# Patient Record
Sex: MALE | Race: WHITE | Employment: OTHER | URBAN - METROPOLITAN AREA
[De-identification: names, ages, dates, MRNs, and addresses within clinical notes are randomized per-mention and may not be internally consistent; named-entity substitution may affect disease eponyms.]

---

## 2021-04-28 ENCOUNTER — APPOINTMENT (OUTPATIENT)
Dept: GENERAL RADIOLOGY | Age: 86
DRG: 291 | End: 2021-04-28
Payer: MEDICARE

## 2021-04-28 ENCOUNTER — HOSPITAL ENCOUNTER (INPATIENT)
Age: 86
LOS: 2 days | Discharge: HOME | DRG: 291 | End: 2021-04-30
Attending: INTERNAL MEDICINE | Admitting: INTERNAL MEDICINE
Payer: MEDICARE

## 2021-04-28 DIAGNOSIS — I50.9 CONGESTIVE HEART FAILURE, UNSPECIFIED HF CHRONICITY, UNSPECIFIED HEART FAILURE TYPE (HCC): ICD-10-CM

## 2021-04-28 DIAGNOSIS — R06.00 DYSPNEA AND RESPIRATORY ABNORMALITIES: Primary | ICD-10-CM

## 2021-04-28 DIAGNOSIS — R77.8 ELEVATED TROPONIN: ICD-10-CM

## 2021-04-28 DIAGNOSIS — R06.89 DYSPNEA AND RESPIRATORY ABNORMALITIES: Primary | ICD-10-CM

## 2021-04-28 PROBLEM — I50.21 ACUTE SYSTOLIC HEART FAILURE (HCC): Status: ACTIVE | Noted: 2021-04-28

## 2021-04-28 LAB
A/G RATIO: 1.3 (ref 1.1–2.2)
ALBUMIN SERPL-MCNC: 3.9 G/DL (ref 3.4–5)
ALP BLD-CCNC: 60 U/L (ref 40–129)
ALT SERPL-CCNC: 16 U/L (ref 10–40)
ANION GAP SERPL CALCULATED.3IONS-SCNC: 12 MMOL/L (ref 3–16)
AST SERPL-CCNC: 35 U/L (ref 15–37)
BASOPHILS ABSOLUTE: 0.1 K/UL (ref 0–0.2)
BASOPHILS RELATIVE PERCENT: 1.1 %
BILIRUB SERPL-MCNC: 0.4 MG/DL (ref 0–1)
BILIRUBIN URINE: NEGATIVE
BLOOD, URINE: NEGATIVE
BUN BLDV-MCNC: 33 MG/DL (ref 7–20)
CALCIUM SERPL-MCNC: 10.5 MG/DL (ref 8.3–10.6)
CHLORIDE BLD-SCNC: 106 MMOL/L (ref 99–110)
CLARITY: CLEAR
CO2: 23 MMOL/L (ref 21–32)
COLOR: YELLOW
CREAT SERPL-MCNC: 1.9 MG/DL (ref 0.8–1.3)
EOSINOPHILS ABSOLUTE: 0.1 K/UL (ref 0–0.6)
EOSINOPHILS RELATIVE PERCENT: 2.1 %
EPITHELIAL CELLS, UA: 0 /HPF (ref 0–5)
GFR AFRICAN AMERICAN: 40
GFR NON-AFRICAN AMERICAN: 33
GLOBULIN: 3 G/DL
GLUCOSE BLD-MCNC: 113 MG/DL (ref 70–99)
GLUCOSE URINE: NEGATIVE MG/DL
HCT VFR BLD CALC: 37.8 % (ref 40.5–52.5)
HEMOGLOBIN: 12.6 G/DL (ref 13.5–17.5)
HYALINE CASTS: 1 /LPF (ref 0–8)
KETONES, URINE: NEGATIVE MG/DL
LEUKOCYTE ESTERASE, URINE: ABNORMAL
LIPASE: 26 U/L (ref 13–60)
LYMPHOCYTES ABSOLUTE: 1.2 K/UL (ref 1–5.1)
LYMPHOCYTES RELATIVE PERCENT: 18.1 %
MCH RBC QN AUTO: 31.7 PG (ref 26–34)
MCHC RBC AUTO-ENTMCNC: 33.4 G/DL (ref 31–36)
MCV RBC AUTO: 95 FL (ref 80–100)
MICROSCOPIC EXAMINATION: YES
MONOCYTES ABSOLUTE: 0.5 K/UL (ref 0–1.3)
MONOCYTES RELATIVE PERCENT: 8.4 %
NEUTROPHILS ABSOLUTE: 4.6 K/UL (ref 1.7–7.7)
NEUTROPHILS RELATIVE PERCENT: 70.3 %
NITRITE, URINE: NEGATIVE
PDW BLD-RTO: 17.2 % (ref 12.4–15.4)
PH UA: 5.5 (ref 5–8)
PLATELET # BLD: 232 K/UL (ref 135–450)
PMV BLD AUTO: 8.4 FL (ref 5–10.5)
POTASSIUM REFLEX MAGNESIUM: 4.9 MMOL/L (ref 3.5–5.1)
PRO-BNP: ABNORMAL PG/ML (ref 0–449)
PROTEIN UA: NEGATIVE MG/DL
RBC # BLD: 3.98 M/UL (ref 4.2–5.9)
RBC UA: 1 /HPF (ref 0–4)
SARS-COV-2, NAAT: NOT DETECTED
SODIUM BLD-SCNC: 141 MMOL/L (ref 136–145)
SPECIFIC GRAVITY UA: 1.01 (ref 1–1.03)
TOTAL PROTEIN: 6.9 G/DL (ref 6.4–8.2)
TROPONIN: 0.02 NG/ML
URINE REFLEX TO CULTURE: YES
URINE TYPE: ABNORMAL
UROBILINOGEN, URINE: 0.2 E.U./DL
WBC # BLD: 6.5 K/UL (ref 4–11)
WBC UA: 10 /HPF (ref 0–5)

## 2021-04-28 PROCEDURE — 87635 SARS-COV-2 COVID-19 AMP PRB: CPT

## 2021-04-28 PROCEDURE — 6370000000 HC RX 637 (ALT 250 FOR IP): Performed by: NURSE PRACTITIONER

## 2021-04-28 PROCEDURE — 99284 EMERGENCY DEPT VISIT MOD MDM: CPT

## 2021-04-28 PROCEDURE — 96374 THER/PROPH/DIAG INJ IV PUSH: CPT

## 2021-04-28 PROCEDURE — 84484 ASSAY OF TROPONIN QUANT: CPT

## 2021-04-28 PROCEDURE — 85025 COMPLETE CBC W/AUTO DIFF WBC: CPT

## 2021-04-28 PROCEDURE — 80053 COMPREHEN METABOLIC PANEL: CPT

## 2021-04-28 PROCEDURE — 83880 ASSAY OF NATRIURETIC PEPTIDE: CPT

## 2021-04-28 PROCEDURE — 1200000000 HC SEMI PRIVATE

## 2021-04-28 PROCEDURE — 93005 ELECTROCARDIOGRAM TRACING: CPT | Performed by: NURSE PRACTITIONER

## 2021-04-28 PROCEDURE — G0378 HOSPITAL OBSERVATION PER HR: HCPCS

## 2021-04-28 PROCEDURE — 87086 URINE CULTURE/COLONY COUNT: CPT

## 2021-04-28 PROCEDURE — 6360000002 HC RX W HCPCS: Performed by: NURSE PRACTITIONER

## 2021-04-28 PROCEDURE — 71045 X-RAY EXAM CHEST 1 VIEW: CPT

## 2021-04-28 PROCEDURE — 83690 ASSAY OF LIPASE: CPT

## 2021-04-28 PROCEDURE — 81001 URINALYSIS AUTO W/SCOPE: CPT

## 2021-04-28 RX ORDER — ASPIRIN 81 MG/1
324 TABLET, CHEWABLE ORAL ONCE
Status: COMPLETED | OUTPATIENT
Start: 2021-04-28 | End: 2021-04-28

## 2021-04-28 RX ORDER — METOPROLOL SUCCINATE 25 MG/1
12.5 TABLET, EXTENDED RELEASE ORAL DAILY
COMMUNITY

## 2021-04-28 RX ORDER — FUROSEMIDE 10 MG/ML
20 INJECTION INTRAMUSCULAR; INTRAVENOUS ONCE
Status: COMPLETED | OUTPATIENT
Start: 2021-04-28 | End: 2021-04-28

## 2021-04-28 RX ORDER — VALSARTAN 40 MG/1
40 TABLET ORAL DAILY
COMMUNITY

## 2021-04-28 RX ORDER — TAMSULOSIN HYDROCHLORIDE 0.4 MG/1
0.4 CAPSULE ORAL NIGHTLY
COMMUNITY

## 2021-04-28 RX ORDER — ASPIRIN 81 MG/1
81 TABLET, CHEWABLE ORAL DAILY
COMMUNITY

## 2021-04-28 RX ORDER — MULTIVIT-MIN/IRON/FOLIC ACID/K 18-600-40
1 CAPSULE ORAL DAILY
COMMUNITY

## 2021-04-28 RX ORDER — ALLOPURINOL 100 MG/1
200 TABLET ORAL DAILY
COMMUNITY

## 2021-04-28 RX ADMIN — FUROSEMIDE 20 MG: 10 INJECTION, SOLUTION INTRAMUSCULAR; INTRAVENOUS at 20:36

## 2021-04-28 RX ADMIN — NITROGLYCERIN 0.5 INCH: 20 OINTMENT TOPICAL at 20:37

## 2021-04-28 RX ADMIN — ASPIRIN 324 MG: 81 TABLET, CHEWABLE ORAL at 19:37

## 2021-04-28 ASSESSMENT — PAIN DESCRIPTION - FREQUENCY: FREQUENCY: INTERMITTENT

## 2021-04-28 ASSESSMENT — PAIN DESCRIPTION - PAIN TYPE: TYPE: ACUTE PAIN

## 2021-04-28 ASSESSMENT — PAIN DESCRIPTION - DESCRIPTORS: DESCRIPTORS: DISCOMFORT

## 2021-04-28 ASSESSMENT — PAIN DESCRIPTION - ORIENTATION: ORIENTATION: LOWER;LEFT

## 2021-04-28 NOTE — ED PROVIDER NOTES
Financial resource strain: None    Food insecurity     Worry: None     Inability: None    Transportation needs     Medical: None     Non-medical: None   Tobacco Use    Smoking status: Current Every Day Smoker     Types: Pipe   Substance and Sexual Activity    Alcohol use: No    Drug use: No    Sexual activity: Not Currently   Lifestyle    Physical activity     Days per week: None     Minutes per session: None    Stress: None   Relationships    Social connections     Talks on phone: None     Gets together: None     Attends Temple service: None     Active member of club or organization: None     Attends meetings of clubs or organizations: None     Relationship status: None    Intimate partner violence     Fear of current or ex partner: None     Emotionally abused: None     Physically abused: None     Forced sexual activity: None   Other Topics Concern    None   Social History Narrative    None     History reviewed. No pertinent family history. PHYSICAL EXAM    VITAL SIGNS: /72   Pulse 92   Temp 98.5 °F (36.9 °C) (Temporal)   Resp 16   Ht 5' 10\" (1.778 m)   Wt 148 lb 9.4 oz (67.4 kg)   SpO2 95%   BMI 21.32 kg/m²    Constitutional:  Well developed, well nourished, no acute distress   HENT:  Atraumatic, moist mucus membranes. Mucous membranes are dry  Neck: supple, no JVD   Respiratory:  Lungs CTA throughout, no wheezes, no retractions.   Does appear dyspneic at rest with some conversational dyspnea  Cardiovascular:  regular rate, no murmurs  Vascular: Radial and DP pulses 2+ and equal bilaterally  GI:  Soft, nontender, normal bowel sounds  Musculoskeletal:  no lower extremity edema, no lower extremity asymmetry,no calf tenderness, no thigh tenderness, no acute deformities  Integument:  Skin is warm and dry, no petechiae   Neurologic:  Alert & oriented, no slurred speech  Psych: Pleasant affect, no hallucinations      EKG    Please see the physician note for EKG interpretation. RADIOLOGY/PROCEDURES    XR CHEST PORTABLE   Final Result   Cardiomegaly with bilateral pleural effusions right greater than left             ED COURSE & MEDICAL DECISION MAKING    Pertinent Labs & Imaging studies reviewed and interpreted. (See chart for details)    See chart for details of medications given during the ED stay. Vitals:    04/28/21 1945 04/28/21 2000 04/28/21 2030 04/28/21 2126   BP: 129/86 124/76 124/80 125/72   Pulse: 87 90 89 92   Resp: 19 22 25 16   Temp:       TempSrc:       SpO2: 96% 96% 97% 95%   Weight:       Height:           Differential Diagnosis: Acute Coronary Syndrome, Congestive Heart Failure, Myocardial Infarction, Pulmonary Embolus, Pneumonia, Pneumothorax, other    CRITICAL CARE NOTE:  There was a high probability of clinically significant life-threatening deterioration of the patient's condition requiring my urgent intervention. Total critical care time was at least 15 minutes. This includes vital sign monitoring, pulse oximetry monitoring, telemetry monitoring, clinical response to the IV medications, reviewing the nursing notes, consultation time, dictation/documentation time, and interpretation of the labwork. This excludes any separately billable procedures performed. The critical care time above also includes time spent obtaining history from electronic chart, as the patient was unable to provide the history AND the history obtained was directly relevant to the care of the patient. Patient is afebrile and nontoxic in appearance. Labs reveal no leukocytosis; stable anemia. Metabolic panel shows no significant electrolyte derangements. LFTs within normal limits. Does have slight increase in patient's creatinine from baseline from 1.7-10 now 1.9. BUN is 33 and GFR is 33. Lipase within normal limits. CXR findings as above. EKG interpreted by physician. Troponin slightly elevated 0.02.   [place] [address]. Consultation with hospitalist: I contacted Dr. Wiley Grimes via [facility], and the patient was accepted for admission. FINAL IMPRESSION    1. Dyspnea and respiratory abnormalities    2. Elevated troponin    3.  Congestive heart failure, unspecified HF chronicity, unspecified heart failure type [facility])        PLAN  Admission to the hospital    (Please note that this note was completed with a voice recognition program.  Every attempt was made to edit the dictations, but inevitably there remain words that are mis-transcribed.)        Julio César Colvin, YULIYA - JAKE  04/28/21 3780

## 2021-04-29 LAB
ANION GAP SERPL CALCULATED.3IONS-SCNC: 16 MMOL/L (ref 3–16)
BUN BLDV-MCNC: 35 MG/DL (ref 7–20)
CALCIUM SERPL-MCNC: 10.1 MG/DL (ref 8.3–10.6)
CHLORIDE BLD-SCNC: 104 MMOL/L (ref 99–110)
CHOLESTEROL, TOTAL: 156 MG/DL (ref 0–199)
CO2: 21 MMOL/L (ref 21–32)
CREAT SERPL-MCNC: 1.9 MG/DL (ref 0.8–1.3)
EKG ATRIAL RATE: 87 BPM
EKG DIAGNOSIS: NORMAL
EKG P AXIS: 62 DEGREES
EKG P-R INTERVAL: 186 MS
EKG Q-T INTERVAL: 428 MS
EKG QRS DURATION: 154 MS
EKG QTC CALCULATION (BAZETT): 515 MS
EKG R AXIS: -18 DEGREES
EKG T AXIS: 73 DEGREES
EKG VENTRICULAR RATE: 87 BPM
GFR AFRICAN AMERICAN: 40
GFR NON-AFRICAN AMERICAN: 33
GLUCOSE BLD-MCNC: 95 MG/DL (ref 70–99)
HCT VFR BLD CALC: 37.5 % (ref 40.5–52.5)
HDLC SERPL-MCNC: 43 MG/DL (ref 40–60)
HEMOGLOBIN: 12.4 G/DL (ref 13.5–17.5)
LDL CHOLESTEROL CALCULATED: 98 MG/DL
MAGNESIUM: 1.9 MG/DL (ref 1.8–2.4)
MCH RBC QN AUTO: 31.1 PG (ref 26–34)
MCHC RBC AUTO-ENTMCNC: 33 G/DL (ref 31–36)
MCV RBC AUTO: 94.1 FL (ref 80–100)
PDW BLD-RTO: 16.8 % (ref 12.4–15.4)
PLATELET # BLD: 222 K/UL (ref 135–450)
PMV BLD AUTO: 8.1 FL (ref 5–10.5)
POTASSIUM SERPL-SCNC: 3.7 MMOL/L (ref 3.5–5.1)
RBC # BLD: 3.98 M/UL (ref 4.2–5.9)
SODIUM BLD-SCNC: 141 MMOL/L (ref 136–145)
TRIGL SERPL-MCNC: 76 MG/DL (ref 0–150)
TROPONIN: 0.03 NG/ML
TROPONIN: 0.03 NG/ML
TSH SERPL DL<=0.05 MIU/L-ACNC: 4.59 UIU/ML (ref 0.27–4.2)
URINE CULTURE, ROUTINE: NORMAL
VLDLC SERPL CALC-MCNC: 15 MG/DL
WBC # BLD: 6.9 K/UL (ref 4–11)

## 2021-04-29 PROCEDURE — 6370000000 HC RX 637 (ALT 250 FOR IP): Performed by: NURSE PRACTITIONER

## 2021-04-29 PROCEDURE — 80061 LIPID PANEL: CPT

## 2021-04-29 PROCEDURE — 93010 ELECTROCARDIOGRAM REPORT: CPT | Performed by: INTERNAL MEDICINE

## 2021-04-29 PROCEDURE — 36415 COLL VENOUS BLD VENIPUNCTURE: CPT

## 2021-04-29 PROCEDURE — 84443 ASSAY THYROID STIM HORMONE: CPT

## 2021-04-29 PROCEDURE — 96376 TX/PRO/DX INJ SAME DRUG ADON: CPT

## 2021-04-29 PROCEDURE — 84484 ASSAY OF TROPONIN QUANT: CPT

## 2021-04-29 PROCEDURE — 83735 ASSAY OF MAGNESIUM: CPT

## 2021-04-29 PROCEDURE — 2580000003 HC RX 258: Performed by: NURSE PRACTITIONER

## 2021-04-29 PROCEDURE — 1200000000 HC SEMI PRIVATE

## 2021-04-29 PROCEDURE — 80048 BASIC METABOLIC PNL TOTAL CA: CPT

## 2021-04-29 PROCEDURE — 6370000000 HC RX 637 (ALT 250 FOR IP): Performed by: INTERNAL MEDICINE

## 2021-04-29 PROCEDURE — 6360000002 HC RX W HCPCS: Performed by: NURSE PRACTITIONER

## 2021-04-29 PROCEDURE — G0378 HOSPITAL OBSERVATION PER HR: HCPCS

## 2021-04-29 PROCEDURE — 94760 N-INVAS EAR/PLS OXIMETRY 1: CPT

## 2021-04-29 PROCEDURE — 99223 1ST HOSP IP/OBS HIGH 75: CPT | Performed by: INTERNAL MEDICINE

## 2021-04-29 PROCEDURE — 85027 COMPLETE CBC AUTOMATED: CPT

## 2021-04-29 RX ORDER — SODIUM CHLORIDE 9 MG/ML
25 INJECTION, SOLUTION INTRAVENOUS PRN
Status: DISCONTINUED | OUTPATIENT
Start: 2021-04-29 | End: 2021-04-30 | Stop reason: HOSPADM

## 2021-04-29 RX ORDER — POLYETHYLENE GLYCOL 3350 17 G/17G
17 POWDER, FOR SOLUTION ORAL DAILY PRN
Status: DISCONTINUED | OUTPATIENT
Start: 2021-04-29 | End: 2021-04-30 | Stop reason: HOSPADM

## 2021-04-29 RX ORDER — ONDANSETRON 2 MG/ML
4 INJECTION INTRAMUSCULAR; INTRAVENOUS EVERY 6 HOURS PRN
Status: DISCONTINUED | OUTPATIENT
Start: 2021-04-29 | End: 2021-04-30 | Stop reason: HOSPADM

## 2021-04-29 RX ORDER — TAMSULOSIN HYDROCHLORIDE 0.4 MG/1
0.4 CAPSULE ORAL NIGHTLY
Status: DISCONTINUED | OUTPATIENT
Start: 2021-04-29 | End: 2021-04-30 | Stop reason: HOSPADM

## 2021-04-29 RX ORDER — ALLOPURINOL 100 MG/1
200 TABLET ORAL DAILY
Status: DISCONTINUED | OUTPATIENT
Start: 2021-04-29 | End: 2021-04-30 | Stop reason: HOSPADM

## 2021-04-29 RX ORDER — ACETAMINOPHEN 650 MG/1
650 SUPPOSITORY RECTAL EVERY 6 HOURS PRN
Status: DISCONTINUED | OUTPATIENT
Start: 2021-04-29 | End: 2021-04-30 | Stop reason: HOSPADM

## 2021-04-29 RX ORDER — SODIUM CHLORIDE 0.9 % (FLUSH) 0.9 %
5-40 SYRINGE (ML) INJECTION EVERY 12 HOURS SCHEDULED
Status: DISCONTINUED | OUTPATIENT
Start: 2021-04-29 | End: 2021-04-30 | Stop reason: HOSPADM

## 2021-04-29 RX ORDER — VITAMIN B COMPLEX
2000 TABLET ORAL DAILY
Status: DISCONTINUED | OUTPATIENT
Start: 2021-04-29 | End: 2021-04-30 | Stop reason: HOSPADM

## 2021-04-29 RX ORDER — ISOSORBIDE MONONITRATE 30 MG/1
30 TABLET, EXTENDED RELEASE ORAL DAILY
Status: DISCONTINUED | OUTPATIENT
Start: 2021-04-29 | End: 2021-04-30 | Stop reason: HOSPADM

## 2021-04-29 RX ORDER — ACETAMINOPHEN 325 MG/1
650 TABLET ORAL EVERY 6 HOURS PRN
Status: DISCONTINUED | OUTPATIENT
Start: 2021-04-29 | End: 2021-04-30 | Stop reason: HOSPADM

## 2021-04-29 RX ORDER — PROMETHAZINE HYDROCHLORIDE 25 MG/1
12.5 TABLET ORAL EVERY 6 HOURS PRN
Status: DISCONTINUED | OUTPATIENT
Start: 2021-04-29 | End: 2021-04-30 | Stop reason: HOSPADM

## 2021-04-29 RX ORDER — VALSARTAN 80 MG/1
40 TABLET ORAL DAILY
Status: DISCONTINUED | OUTPATIENT
Start: 2021-04-29 | End: 2021-04-30 | Stop reason: HOSPADM

## 2021-04-29 RX ORDER — ASPIRIN 81 MG/1
81 TABLET, CHEWABLE ORAL DAILY
Status: DISCONTINUED | OUTPATIENT
Start: 2021-04-29 | End: 2021-04-30 | Stop reason: HOSPADM

## 2021-04-29 RX ORDER — FUROSEMIDE 10 MG/ML
40 INJECTION INTRAMUSCULAR; INTRAVENOUS 2 TIMES DAILY
Status: DISCONTINUED | OUTPATIENT
Start: 2021-04-29 | End: 2021-04-29

## 2021-04-29 RX ORDER — FUROSEMIDE 10 MG/ML
40 INJECTION INTRAMUSCULAR; INTRAVENOUS DAILY
Status: DISCONTINUED | OUTPATIENT
Start: 2021-04-29 | End: 2021-04-30

## 2021-04-29 RX ORDER — METOPROLOL SUCCINATE 25 MG/1
12.5 TABLET, EXTENDED RELEASE ORAL DAILY
Status: DISCONTINUED | OUTPATIENT
Start: 2021-04-29 | End: 2021-04-30 | Stop reason: HOSPADM

## 2021-04-29 RX ORDER — SODIUM CHLORIDE 0.9 % (FLUSH) 0.9 %
5-40 SYRINGE (ML) INJECTION PRN
Status: DISCONTINUED | OUTPATIENT
Start: 2021-04-29 | End: 2021-04-30 | Stop reason: HOSPADM

## 2021-04-29 RX ADMIN — FUROSEMIDE 40 MG: 10 INJECTION, SOLUTION INTRAMUSCULAR; INTRAVENOUS at 08:34

## 2021-04-29 RX ADMIN — VALSARTAN 40 MG: 80 TABLET, FILM COATED ORAL at 08:34

## 2021-04-29 RX ADMIN — TAMSULOSIN HYDROCHLORIDE 0.4 MG: 0.4 CAPSULE ORAL at 01:00

## 2021-04-29 RX ADMIN — ISOSORBIDE MONONITRATE 30 MG: 30 TABLET, EXTENDED RELEASE ORAL at 20:28

## 2021-04-29 RX ADMIN — Medication 10 ML: at 20:28

## 2021-04-29 RX ADMIN — Medication 2000 UNITS: at 08:34

## 2021-04-29 RX ADMIN — APIXABAN 5 MG: 5 TABLET, FILM COATED ORAL at 08:34

## 2021-04-29 RX ADMIN — ASPIRIN 81 MG: 81 TABLET, CHEWABLE ORAL at 08:34

## 2021-04-29 RX ADMIN — TAMSULOSIN HYDROCHLORIDE 0.4 MG: 0.4 CAPSULE ORAL at 20:28

## 2021-04-29 RX ADMIN — APIXABAN 5 MG: 5 TABLET, FILM COATED ORAL at 01:00

## 2021-04-29 RX ADMIN — APIXABAN 5 MG: 5 TABLET, FILM COATED ORAL at 20:28

## 2021-04-29 RX ADMIN — ALLOPURINOL 200 MG: 100 TABLET ORAL at 08:34

## 2021-04-29 RX ADMIN — Medication 10 ML: at 08:36

## 2021-04-29 RX ADMIN — METOPROLOL SUCCINATE 12.5 MG: 25 TABLET, EXTENDED RELEASE ORAL at 08:34

## 2021-04-29 ASSESSMENT — PAIN SCALES - GENERAL: PAINLEVEL_OUTOF10: 0

## 2021-04-29 NOTE — PROGRESS NOTES
light. Glasses   Neck: Supple,  full range of motion. No jugular venous distention. Trachea midline. Respiratory:  Normal effort. Clear to auscultation,  Cardiovascular: Regular rate and rhythm with normal S1/S2 without murmurs, rubs or gallops. Abdomen: Soft, non-tender, non-distended with normal bowel sounds. Musculoskeletal: No clubbing, cyanosis or edema bilaterally. Full range of motion without deformity. Skin: Skin color, texture, turgor normal.  No rashes or lesions. Neurologic:  Neurovascularly intact without any focal sensory/motor deficits. Cranial nerves: II-XII intact, grossly non-focal.  Psychiatric: Alert and oriented, thought content appropriate, normal insight  Peripheral Pulses: +2 palpable, equal bilaterally       Labs:   Recent Labs     04/28/21 1932 04/29/21 0342   WBC 6.5 6.9   HGB 12.6* 12.4*   HCT 37.8* 37.5*    222     Recent Labs     04/28/21 1932 04/29/21  0343    141   K 4.9 3.7    104   CO2 23 21   BUN 33* 35*   CREATININE 1.9* 1.9*   CALCIUM 10.5 10.1     Recent Labs     04/28/21 1932   AST 35   ALT 16   BILITOT 0.4   ALKPHOS 60     No results for input(s): INR in the last 72 hours. Recent Labs     04/28/21 1932 04/29/21  0054 04/29/21 0342   TROPONINI 0.02* 0.03* 0.03*       Urinalysis:      Lab Results   Component Value Date    NITRU Negative 04/28/2021    WBCUA 10 04/28/2021    RBCUA 1 04/28/2021    BLOODU Negative 04/28/2021    SPECGRAV 1.010 04/28/2021    GLUCOSEU Negative 04/28/2021    GLUCOSEU NEGATIVE 09/03/2011       Radiology:  XR CHEST PORTABLE   Final Result   Cardiomegaly with bilateral pleural effusions right greater than left         XR CHEST PA INSPIRATION 1 VW    (Results Pending)       Echo [facility] 3/20/21    Overall left ventricular ejection fraction is estimated to be 30-35%. Mild aortic regurgitation. There is moderate to severe global hypokinesis of the left ventricle. There is trace mitral regurgitation.   Mild pulmonic valvular regurgitation. The mitral inflow pattern is consistent with Diastolic Dysfunction. Echo 9/2020  Overall left ventricular ejection fraction is estimated to be 40-45%. Diffuse hypokinesis with moderate hypokinesis of the basal and mid  inferolateral, basal inferior segments. Assessment/Plan:    Active Hospital Problems    Diagnosis    Acute systolic heart failure (HCC) [I50.21]     Acute on chronic systolic CHF (actually may be a combined as has diastolic dysfx as well)   Has seen an outpt cardiologist they couldn't remember name , Adry Ambrosio seen in a while then recently saw   Echo good shaylee March EF 30-35% with global hypokinesis   --continue toprol 12.5mg daily  ARB diovan 40mg daily   Start lasix 40mg IV daily   Daily weight  148->136    Hx DVT  -on apixaban     BPH  On flomax    CKD stage III  cr4 1.9   reportedly cardiology talking about cath. Pt is in agreeement . Caution to use as little dye as possible given his underlying renal fx. Doesn't see a kidney dr.     He is otherwise doing well and weaned to RA.  Await cardiology recs           DVT Prophylaxis: eliquis   Diet: DIET LOW SODIUM 2 GM; 1500 ml  Code Status: Full Code    PT/OT Eval Status: independent     Dispo - home     Ronnell Mcintyre MD

## 2021-04-29 NOTE — CARE COORDINATION
INITIAL CASE MANAGEMENT ASSESSMENT    Reviewed chart, met with patient to assess possible discharge needs. Explained Case Management role/services. Living Situation: confirmed address, lives alone in home with 1 TONIO     ADLs: independent      DME: patient states he has a walker and a cane which he uses as needed    PT/OT Recs: not ordered     Active Services: none at this time, patient states he was getting [facility] services through [facility] previously - see update below     Transportation: does not drive, patient reports his son Marge Amezcua drives him to appointments and will likely  at discharge     Medications: Pharmacy: [facility], no issues obtaining or taking medications, patient son is a pharmacist at this facility and helps with medications     PCP: confirmed [facility]     PLAN/COMMENTS: plan is home with family support, follow for needs     SW/CM provided contact information for patient or family to call with any questions. SW/CM will follow and assist as needed. Roberto FELIZ, SHERLYN-S, Social Work  [phone]    Electronically signed by TRAY Hampton, TOÑOW on 4/29/2021 at 2:19 PM    Sw received call from Lela Coughlin with [facility] ([phone]) who reports that patient is active with them (PT and RN services). He will need resumption orders at discharge.      Electronically signed by TRAY Hampton LSW on 4/29/2021 at 4:04 PM

## 2021-04-29 NOTE — ED NOTES
Report given to [facility] [place]        December JANELLE Cornell  04/28/21 0846

## 2021-04-29 NOTE — PLAN OF CARE
Problem: Pain:  Goal: Pain level will decrease  Description: Pain level will decrease  4/29/2021 1338 by Shaan Moraes RN  Outcome: Ongoing  4/29/2021 0135 by Rebecca Chand RN  Outcome: Ongoing     Problem: Pain:  Goal: Control of acute pain  Description: Control of acute pain  4/29/2021 1338 by Shaan Moraes RN  Outcome: Ongoing  4/29/2021 0135 by Rebecca Chand RN  Outcome: Ongoing

## 2021-04-29 NOTE — PLAN OF CARE
Problem: Pain:  Goal: Pain level will decrease  Description: Pain level will decrease  Outcome: Ongoing  Goal: Control of acute pain  Description: Control of acute pain  Outcome: Ongoing  Goal: Control of chronic pain  Description: Control of chronic pain  Outcome: Ongoing     Problem: OXYGENATION/RESPIRATORY FUNCTION  Goal: Patient will maintain patent airway  Outcome: Ongoing  Goal: Patient will achieve/maintain normal respiratory rate/effort  Description: Respiratory rate and effort will be within normal limits for the patient  Outcome: Ongoing     Problem: HEMODYNAMIC STATUS  Goal: Patient has stable vital signs and fluid balance  Outcome: Ongoing     Problem: FLUID AND ELECTROLYTE IMBALANCE  Goal: Fluid and electrolyte balance are achieved/maintained  Outcome: Ongoing     Problem: ACTIVITY INTOLERANCE/IMPAIRED MOBILITY  Goal: Mobility/activity is maintained at optimum level for patient  Outcome: Ongoing

## 2021-04-29 NOTE — H&P
Hospital Medicine History & Physical      PCP: Trish Apgar    Date of Admission: 2021    Date of Service: Pt seen/examined on 2021 and Admitted to Inpatient with expected LOS greater than two midnights due to medical therapy. Chief Complaint:  Shortness of breath      History Of Present Illness:      80 y.o. male with PMHx of Skin Ca, HTN, DVT, CVA and HF presented to [facility] with shortness of breath. Patient reports shortness of breath began Monday worse lying flat and with exertion. He started taking Lasix ordered by PCP with some improvement. He states he still cannot lay flat. He denies having any chest pain. He denies dizziness or lightheadedness. On chart review patient was admitted about a month ago at an outside hospital for the same. He has history of systolic heart failure and was diuresed with IV Lasix with improvement in symptoms. Past Medical History:          Diagnosis Date    Cancer [facility])     skin on head    CHF (congestive heart failure) (HCC)     TIA (transient ischemic attack)        Past Surgical History:      History reviewed. No pertinent surgical history. Medications Prior to Admission:      Prior to Admission medications    Medication Sig Start Date End Date Taking?  Authorizing Provider   apixaban (ELIQUIS) 5 MG TABS tablet Take 5 mg by mouth 2 times daily   Yes Historical Provider, MD   metoprolol succinate (TOPROL XL) 25 MG extended release tablet Take 12.5 mg by mouth daily   Yes Historical Provider, MD   tamsulosin (FLOMAX) 0.4 MG capsule Take 0.4 mg by mouth nightly   Yes Historical Provider, MD   valsartan (DIOVAN) 40 MG tablet Take 40 mg by mouth daily   Yes Historical Provider, MD   Cholecalciferol (VITAMIN D) 50 MCG ( UT) CAPS capsule Take 1 capsule by mouth daily   Yes Historical Provider, MD   aspirin 81 MG chewable tablet Take 81 mg by mouth daily   Yes Historical Provider, MD   allopurinol (ZYLOPRIM) 100 MG tablet Take 200 Recent Labs     04/28/21 1932   WBC 6.5   HGB 12.6*   HCT 37.8*        Recent Labs     04/28/21 1932      K 4.9      CO2 23   BUN 33*   CREATININE 1.9*   CALCIUM 10.5     Recent Labs     04/28/21 1932   AST 35   ALT 16   BILITOT 0.4   ALKPHOS 60     No results for input(s): INR in the last 72 hours. Recent Labs     04/28/21 1932   TROPONINI 0.02*       Urinalysis:      Lab Results   Component Value Date    NITRU Negative 04/28/2021    WBCUA 10 04/28/2021    RBCUA 1 04/28/2021    BLOODU Negative 04/28/2021    SPECGRAV 1.010 04/28/2021    GLUCOSEU Negative 04/28/2021    GLUCOSEU NEGATIVE 09/03/2011       Radiology:     EKG:  I have reviewed the EKG with the following interpretation: Normal sinus rhythm, ventricular rate 87. No acute ST elevation.     XR CHEST PORTABLE   Final Result   Cardiomegaly with bilateral pleural effusions right greater than left             ASSESSMENT:    Active Hospital Problems    Diagnosis Date Noted    Acute systolic heart failure ([address] [I50.21] 04/28/2021         PLAN:    Acute systolic CHF   - LAY 2/84/25 LVEF 30-35% mild aortic regurg, mod to sev global hypokinesis of LV  - proBNP 93459  - lasix 40 mg IV daily  - cont BB, ACEi/ARB  - daily wts  - I/Os  - consult CHF RN  - consult cardiology    Acute on Chronic Renal Failure    - Renal function is slightly above baseline, today 1.9 (1.4-1.6)  - had urinary retention 3/21 and urine studies performed, will bladder scan  - renal u/s 3/21: no obstructive uropathy, 4.4cm simple cyst left superior pole, thinning of the left renal parenchymal cortex may reflect asymmetric medical renal disease  - Monitor renal function and avoid Nephrotoxic agents as able     DVT  - continue Eliquis    HTN  - monitor blood pressure  - continue metoprolol    DVT Prophylaxis: Eliquis  Diet: DIET LOW SODIUM 2 GM;  Code Status: Full Code    Dispo - Inpatient       [address], APRN - CNP    Thank you Alessandro Seaman for the opportunity to be involved in this patient's care. If you have any questions or concerns please feel free to contact me at [phone].

## 2021-04-29 NOTE — DISCHARGE INSTR - COC
Continuity of Care Form    Patient Name: Tc Kapoor   :  1930  MRN:  [id number]    Admit date:  2021  Discharge date:  ***    Code Status Order: Full Code   Advance Directives:   Advance Care Flowsheet Documentation       Date/Time Healthcare Directive Type of Healthcare Directive Copy in [address] Agent's Name Healthcare Agent's Phone Number    21 0013  Yes, patient has an advance directive for healthcare treatment  Living will  No, copy requested from family  --  --  --            Admitting Physician:  Lita Chavez MD  PCP: Eveline Garcia    Discharging Nurse: [facility] Unit/Room#: L6C-4674/2133-22  Discharging Unit Phone Number: ***    Emergency Contact:   Extended Emergency Contact Information  Primary Emergency Contact: Rafiq Aparicio, [address] [place] [address] Phone: [phone]  Mobile Phone: [phone]  Relation: Child  Secondary Emergency Contact: Kris Sorto  [place] Phone: [phone]  Relation: Child  Preferred language: English    Past Surgical History:  History reviewed. No pertinent surgical history. Immunization History: There is no immunization history on file for this patient.     Active Problems:  Patient Active Problem List   Diagnosis Code    Acute systolic heart failure (HCC) I50.21       Isolation/Infection:   Isolation            No Isolation          Patient Infection Status       Infection Onset Added Last Indicated Last Indicated By Review Planned Expiration Resolved Resolved By    None active    Resolved    COVID-19 Rule Out 21 SARS-CoV-2 NAAT (Rapid) (Ordered)   21 Rule-Out Test Resulted            Nurse Assessment:  Last Vital Signs: /68   Pulse 63   Temp 98.8 °F (37.1 °C) (Oral)   Resp 16   Ht 5' 10\" (1.778 m)   Wt 136 lb 3.9 oz (61.8 kg)   SpO2 93%   BMI 19.55 kg/m²     Last documented pain score (0-10 scale): Pain Level: 0  Last Weight:   Wt Readings from Last 1 Encounters:   04/29/21 136 lb 3.9 oz (61.8 kg)     Mental Status:  oriented and alert    IV Access:  - None    Nursing Mobility/ADLs:  Walking   Independent  Transfer  Independent  Bathing  Independent  Dressing  Independent  Toileting  Independent  Feeding  Independent  Med [address]  Med Delivery   whole    Wound Care Documentation and Therapy:        Elimination:  Continence:   · Bowel: Yes  · Bladder: Yes  Urinary Catheter: None   Colostomy/Ileostomy/Ileal Conduit: No       Date of Last BM: 4/29/2021    Intake/Output Summary (Last 24 hours) at 4/29/2021 1322  Last data filed at 4/29/2021 1313  Gross per 24 hour   Intake 800 ml   Output --   Net 800 ml     I/O last 3 completed shifts: In: 300 [P.O.:300]  Out: -     Safety Concerns: At Risk for Falls    Impairments/Disabilities:      None    Nutrition Therapy:  Current Nutrition Therapy:   - Oral Diet:  Low Sodium (2gm)    Routes of Feeding: Oral  Liquids: Thin Liquids  Daily Fluid Restriction: yes - amount 1500  Last Modified Barium Swallow with Video (Video Swallowing Test): not done    Treatments at the Time of Hospital Discharge:   Respiratory Treatments: ***  Oxygen Therapy:  is not on home oxygen therapy. Ventilator:    - No ventilator support     Heart Failure Instructions for Daily Management  Patient was treated for acute on chronic diastolic and combined systolic and diastolic heart failure. he  will require the following:     Please weigh daily on the same scale and approximately the same time of day. Report weight gain of 3 pounds/day or 5 pounds/week to : [facility] [phone].  Please use hospital discharge weight as baseline reference.     Please monitor for signs and symptoms of and report to MD:  o Worsening Heart Failure: sudden weight gain, shortness of breath, lower extremity or general edema/swelling, abdominal bloating/swelling, inability to lie flat, intolerance to usual activity, or cough (especially at night). Report these finding even if no increase in weight.  o Dehydration:  having difficulty or a decrease in urination, dizziness, worsening fatigue, or new onset/worsening of generalized weakness.  Please continue a LOW SODIUM diet and LIMIT fluid intake to 48 - 64 ounces ( 1.5 - 2 liters) per day. [facility] Call [facility] [phone] and/or Pili Chong @ [phone] with any questions or concerns.  Please continue heart failure education to patient and family/support system.  See After Visit Summary for hospital follow up appointment details.  Consider spiritual care referral for support and/or completion of advance directives [phone].  Consider: [place] [address] telehealth program if patient agreeable and able to participate, palliative care consult for ongoing goals of care, end of life, and/or chronic disease management discussions and referral to [facility] ([phone]) once SNF/HHC complete.   Dr Kacey Merino is 's primary cardiologist.       Rehab Therapies: Physical Therapy and Occupational Therapy  Weight Bearing Status/Restrictions: No weight bearing restirctions  Other Medical Equipment (for information only, NOT a DME order):  none  Other Treatments: ***    Patient's personal belongings (please select all that are sent with patient):  None    RN SIGNATURE:  Electronically signed by Sandra Braxton RN on 4/30/21 at 4:44 PM EDT    CASE MANAGEMENT/SOCIAL WORK SECTION    Inpatient Status Date: 04/30/2021    Readmission Risk Assessment Score:  Readmission Risk              Risk of Unplanned Readmission:        13           Discharging to Facility/ Agency   · Name: [facility]  · Address: [address] [place] [address]  · Phone: [phone]  · Fax: [phone]    / signature: Electronically signed by ANKIT Flores on 4/30/2021 at 4:27 PM      PHYSICIAN SECTION    Prognosis:

## 2021-04-29 NOTE — PROGRESS NOTES
Admitted patient to room 4257 from the Emergency Room with CHF. VS stable (see flowsheet). Patient's breathing regular and unlabored, denies pain. Oriented to room, call light, TV, phone, patient rights and responsibilities. Bed in lowest position and locked, exit alarm in place. Non-slip socks on. ID bracelet on and correct per pt verbally reporting name and date of birth. Call light within reach. Needed items within reach.

## 2021-04-29 NOTE — PROGRESS NOTES
4 Eyes Skin Assessment     NAME:  John Hauser  YOB: 1930  MEDICAL RECORD NUMBER:  [id number]    The patient is being assess for  Admission    I agree that 2 RN's have performed a thorough Head to Toe Skin Assessment on the patient. ALL assessment sites listed below have been assessed. Areas assessed by both nurses:    Head, Face, Ears, Shoulders, Back, Chest, Arms, Elbows, Hands, Sacrum. Buttock, Coccyx, Ischium and Legs. Feet and Heels-scattered moles        Does the Patient have a Wound?  No noted wound(s)       Jose Prevention initiated:  No   Wound Care Orders initiated:  No    Pressure Injury (Stage 3,4, Unstageable, DTI, NWPT, and Complex wounds) if present place consult order under [de-identified] No    New and Established Ostomies if present place consult order under : No      Nurse 1 eSignature: Electronically signed by Cipriano Garcia RN on 4/29/21 at 1:42 AM EDT    **SHARE this note so that the co-signing nurse is able to place an eSignature**    Nurse 2 eSignature: Electronically signed by Chino Gonzalez RN on 4/29/2021 at 1:43 AM

## 2021-04-29 NOTE — ED PROVIDER NOTES
I did not have face-to-face interaction with this patient. I did not discuss the case with the advanced practice provider. I was available for consultation on the patient if deemed necessary by advanced practice provider. EKG  The Ekg interpreted by me shows  normal sinus rhythm with a rate of 87  Axis is   Normal  QTc is  515ms  Multifocal PVCs  ST Segments: Inverted lateral T waves  No prior EKG available for comparison.               Vanessa Vides MD  04/28/21 1638

## 2021-04-29 NOTE — ED TRIAGE NOTES
Pt to ED with complaints of SOB since Monday. Hx of HF. Pt took Lasix per cardiologist with no relief. Pt on RA.

## 2021-04-30 ENCOUNTER — APPOINTMENT (OUTPATIENT)
Dept: GENERAL RADIOLOGY | Age: 86
DRG: 291 | End: 2021-04-30
Payer: MEDICARE

## 2021-04-30 VITALS
WEIGHT: 130.51 LBS | HEIGHT: 70 IN | DIASTOLIC BLOOD PRESSURE: 88 MMHG | OXYGEN SATURATION: 94 % | HEART RATE: 75 BPM | BODY MASS INDEX: 18.68 KG/M2 | RESPIRATION RATE: 16 BRPM | TEMPERATURE: 97.6 F | SYSTOLIC BLOOD PRESSURE: 129 MMHG

## 2021-04-30 PROBLEM — E44.0 MODERATE MALNUTRITION (HCC): Chronic | Status: ACTIVE | Noted: 2021-04-30

## 2021-04-30 PROBLEM — I50.23 ACUTE ON CHRONIC SYSTOLIC HF (HEART FAILURE) (HCC): Status: ACTIVE | Noted: 2021-04-28

## 2021-04-30 LAB
ANION GAP SERPL CALCULATED.3IONS-SCNC: 11 MMOL/L (ref 3–16)
BUN BLDV-MCNC: 34 MG/DL (ref 7–20)
CALCIUM SERPL-MCNC: 10.1 MG/DL (ref 8.3–10.6)
CHLORIDE BLD-SCNC: 104 MMOL/L (ref 99–110)
CO2: 25 MMOL/L (ref 21–32)
CREAT SERPL-MCNC: 1.7 MG/DL (ref 0.8–1.3)
GFR AFRICAN AMERICAN: 46
GFR NON-AFRICAN AMERICAN: 38
GLUCOSE BLD-MCNC: 95 MG/DL (ref 70–99)
MAGNESIUM: 1.8 MG/DL (ref 1.8–2.4)
POTASSIUM SERPL-SCNC: 3.7 MMOL/L (ref 3.5–5.1)
SODIUM BLD-SCNC: 140 MMOL/L (ref 136–145)

## 2021-04-30 PROCEDURE — 96376 TX/PRO/DX INJ SAME DRUG ADON: CPT

## 2021-04-30 PROCEDURE — 6370000000 HC RX 637 (ALT 250 FOR IP): Performed by: INTERNAL MEDICINE

## 2021-04-30 PROCEDURE — 2580000003 HC RX 258: Performed by: NURSE PRACTITIONER

## 2021-04-30 PROCEDURE — 99233 SBSQ HOSP IP/OBS HIGH 50: CPT | Performed by: NURSE PRACTITIONER

## 2021-04-30 PROCEDURE — 83735 ASSAY OF MAGNESIUM: CPT

## 2021-04-30 PROCEDURE — 94760 N-INVAS EAR/PLS OXIMETRY 1: CPT

## 2021-04-30 PROCEDURE — G0378 HOSPITAL OBSERVATION PER HR: HCPCS

## 2021-04-30 PROCEDURE — 6360000002 HC RX W HCPCS: Performed by: NURSE PRACTITIONER

## 2021-04-30 PROCEDURE — 6370000000 HC RX 637 (ALT 250 FOR IP): Performed by: NURSE PRACTITIONER

## 2021-04-30 PROCEDURE — 80048 BASIC METABOLIC PNL TOTAL CA: CPT

## 2021-04-30 PROCEDURE — 71045 X-RAY EXAM CHEST 1 VIEW: CPT

## 2021-04-30 PROCEDURE — 36415 COLL VENOUS BLD VENIPUNCTURE: CPT

## 2021-04-30 RX ORDER — FUROSEMIDE 40 MG/1
40 TABLET ORAL DAILY
Qty: 30 TABLET | Refills: 0 | Status: SHIPPED | OUTPATIENT
Start: 2021-05-01

## 2021-04-30 RX ORDER — FUROSEMIDE 40 MG/1
40 TABLET ORAL DAILY
Status: DISCONTINUED | OUTPATIENT
Start: 2021-05-01 | End: 2021-04-30 | Stop reason: HOSPADM

## 2021-04-30 RX ORDER — ISOSORBIDE MONONITRATE 30 MG/1
30 TABLET, EXTENDED RELEASE ORAL DAILY
Qty: 30 TABLET | Refills: 0 | Status: SHIPPED | OUTPATIENT
Start: 2021-05-01

## 2021-04-30 RX ADMIN — Medication 10 ML: at 09:24

## 2021-04-30 RX ADMIN — FUROSEMIDE 40 MG: 10 INJECTION, SOLUTION INTRAMUSCULAR; INTRAVENOUS at 09:22

## 2021-04-30 RX ADMIN — ALLOPURINOL 200 MG: 100 TABLET ORAL at 09:22

## 2021-04-30 RX ADMIN — APIXABAN 5 MG: 5 TABLET, FILM COATED ORAL at 09:22

## 2021-04-30 RX ADMIN — ISOSORBIDE MONONITRATE 30 MG: 30 TABLET, EXTENDED RELEASE ORAL at 09:22

## 2021-04-30 RX ADMIN — Medication 2000 UNITS: at 09:22

## 2021-04-30 RX ADMIN — ASPIRIN 81 MG: 81 TABLET, CHEWABLE ORAL at 09:22

## 2021-04-30 NOTE — CONSULTS
[address] [facility]                                  CONSULTATION    PATIENT NAME: Meaghan Dias                        :        1930  MED REC NO:   [id number]                          ROOM:       [id number]  ACCOUNT NO:   [de-identified]                           ADMIT DATE: 2021  PROVIDER:     Abdi Boone MD    CARDIOLOGY CONSULTATION    CONSULT DATE:  2021    HISTORY OF PRESENT ILLNESS:  This is a pleasant 80-year-old male with a  history of hypertension, DVT, systolic heart failure, CVA, has presented  to the hospital with a worsening shortness of breath especially at night  time but he was recently in the hospital for the similar symptoms and  was diagnosed with a systolic heart failure. He said his symptoms  started to get worse and he was unable to lay down flat at night time. He had no chest pain. He did have some shortness of breath with  exertion during the day time. He had no swelling in his feet. No  fever, chills, or rigors. In the emergency room, he was found to have  CHF decompensation. He was admitted for further care. The patient  seemed to think that symptoms have improved since he was admitted to the  hospital.  He does not weigh himself daily and he is not sure whether he  is following the low-sodium diet correctly or not. He is compliant with  his medication. REVIEW OF SYSTEMS:  Please see HPI. All other systems are reviewed and  they are negative. PAST MEDICAL HISTORY:  1. History of congestive heart failure with last echocardiogram from  2021 showed EF of 30 to 35%, mild aortic regurgitation. 2.  History of CVA. 3.  History of DVT. PAST SURGICAL HISTORY:  Unremarkable. MEDICINES AND ALLERGIES:  Have been reviewed. SOCIAL HISTORY:  Currently lives by himself with some good family  support. Denies any smoking or alcohol abuse.     PHYSICAL EXAMINATION:  VITAL SIGNS:  His pulse is 74 and regular, blood pressure 125/69,  respirations are 20, oxygen saturation 94%. CONSTITUTIONAL:  He is alert and oriented. HEENT:  His neck is supple. His JVP is slightly elevated. No carotid  bruits. CARDIAC EXAMINATION:  Reveals normal S1 and S2. There is a mild soft  systolic murmur at the apex. I could not appreciate any gallop or rub. LUNG EXAMINATION:  Revealed diminished breath sounds but no significant  crackles appreciated. ABDOMEN:  Soft, nontender. Bowel sounds are present. EXTREMITIES:  Show trace of edema. NEUROLOGICAL EXAMINATION:  Alert, oriented. Cranial nerves II through  XII intact. No focal deficit. SKIN:  Shows no rashes. LABORATORY DATA:  Sodium is 141, potassium 3.7, chloride is 104, bicarb  21, BUN is 35, creatinine 1.9. The patient's troponin is marginally  elevated. Cholesterol 156, HDL is 43, LDL is 98, triglycerides are 75. Chest x-ray revealed cardiomegaly with bilateral pleural effusions,  right greater than left. EKG shows sinus rhythm, right bundle branch block, ST and T-wave  abnormalities. IMPRESSION:  1. This is a 51-year-old male who has presented with recurrent symptoms  of shortness of breath and appears to have acute-on-chronic systolic  heart failure, New [place] Heart Association Class III on admission. This  is probably related to his noncompliance with the diet and fluid  restriction. His renal failure also probably playing a role into his  exacerbation. 2.  Hypertension. 3.  Chronic kidney disease stage III. 4.  History of DVT. RECOMMENDATIONS:  1.  I agree with IV Lasix daily and we will consider switching the  patient to oral Lasix tomorrow once his labs look better. 2.  We will add the patient on isosorbide for preload reduction. 3.  We will obtain a chest x-ray to see if there is any improvement in  his congestive heart failure.   4.  The patient currently on low dose evidence-based beta-blocker and  Diovan therapy and we will maximize dose treatment depending on the  patient's blood pressure as well as renal function. 5.  I have discussed CardioMEMS with the patient since he had a chronic  kidney disease and recurrent heart failure that he might be an ideal  candidate for that. He wants to think it over and discuss with his  family before making the decision. Complexity of medical decision making is extremely high.         Mervat Carreno MD    D: 04/29/2021 16:33:20       T: 04/29/2021 19:05:41     MARIA [email or web address]  Job#: [id number]     Doc#: [id number]    CC:

## 2021-04-30 NOTE — PLAN OF CARE
Nutrition Problem #1: Moderate malnutrition  Intervention: Food and/or Nutrient Delivery: Continue Current Diet, Start Oral Nutrition Supplement  Nutritional Goals: PO intake greater than 50%

## 2021-04-30 NOTE — PROGRESS NOTES
Physician Progress Note      PATIENT:               Efe Das  CSN #:                  [id number]  :                       1930  ADMIT DATE:       2021 6:52 PM  [address] [place] DATE:  RESPONDING  PROVIDER #:        Alondra Humphries MD          QUERY TEXT:    Patient admitted with acute on chronic systolic CHF and HOLLI on CKD. If   possible, please document in the progress notes and discharge summary if you   are evaluating and/or treating any of the following: The medical record reflects the following:  Risk Factors: HTN, CHF  Clinical Indicators: HOLLI on CKD with BUN 33, creat 1..9, GFR 33  Treatment: monitoring BMP    Thank you,  Sophia Garner RN, BSN, CCDS  [email or web address]  Options provided:  -- CKD Stage 1 GFR>90  -- CKD Stage 2 GFR 60-90  -- CKD Stage 3a GFR 45-59  -- CKD Stage 3b GFR 30-44  -- CKD Stage 4 GFR 15-29  -- CKD Stage 5 GFR<15  -- ESRD  -- Other - I will add my own diagnosis  -- Disagree - Not applicable / Not valid  -- Disagree - Clinically unable to determine / Unknown  -- Refer to Clinical Documentation Reviewer    PROVIDER RESPONSE TEXT:    This patient has CKD Stage 3b.     Query created by: Katy Ortega on 2021 9:12 AM      Electronically signed by:  Alondra Humphries MD 2021 4:18 PM

## 2021-04-30 NOTE — PROGRESS NOTES
 furosemide  40 mg Oral Daily    allopurinol  200 mg Oral Daily    apixaban  5 mg Oral BID    aspirin  81 mg Oral Daily    Vitamin D  2,000 Units Oral Daily    metoprolol succinate  12.5 mg Oral Daily    tamsulosin  0.4 mg Oral Nightly    valsartan  40 mg Oral Daily    sodium chloride flush  5-40 mL Intravenous 2 times per day    isosorbide mononitrate  30 mg Oral Daily      sodium chloride       sodium chloride flush, sodium chloride, promethazine **OR** ondansetron, polyethylene glycol, acetaminophen **OR** acetaminophen    Lab Data:  CBC:   Recent Labs     04/28/21 1932 04/29/21 0342   WBC 6.5 6.9   HGB 12.6* 12.4*    222     BMP:    Recent Labs     04/28/21 1932 04/29/21 0343 04/30/21  0634    141 140   K 4.9 3.7 3.7   CO2 23 21 25   BUN 33* 35* 34*   CREATININE 1.9* 1.9* 1.7*     LIVR:   Recent Labs     04/28/21 1932   AST 35   ALT 16     Results for Maddi Mustafa (MRN [id number]) as of 4/30/2021 12:08   Ref. Range 4/28/2021 19:32 4/29/2021 00:54 4/29/2021 03:42   Pro-BNP Latest Ref Range: 0 - 449 pg/mL 13,690 (H)     Troponin Latest Ref Range: <0.01 ng/mL 0.02 (H) 0.03 (H) 0.03 (H)     Results for Maddi Mustafa (MRN [id number]) as of 4/30/2021 12:08   Ref. Range 4/29/2021 03:43   Cholesterol, Total Latest Ref Range: 0 - 199 mg/dL 156   HDL Cholesterol Latest Ref Range: 40 - 60 mg/dL 43   LDL Calculated Latest Ref Range: <100 mg/dL 98   Triglycerides Latest Ref Range: 0 - 150 mg/dL 76   VLDL Cholesterol Calculated Latest Ref Range: Not Established mg/dL 15     CXR 4/30/2021:  Improved appearance the chest with decreased right pleural effusion, mild   residual.  Decreased right basilar atelectasis, mild residual.  No new   pulmonary findings, no acute abnormality elsewhere     ECG: Sinus rhythm, RBBB, prolonged QT interval, nonspecific ST-TW changes.     3/2021 Renal US:  No evidence of obstructive uropathy  4.4 cm simple cyst left superior pole kidney  Thinning of the left renal parenchymal cortex may reflect asymmetric medical renal disease    3/20/2021 Echo:  Overall left ventricular ejection fraction is estimated to be 30-35%. Mild aortic regurgitation. There is moderate to severe global hypokinesis of the left ventricle. There is trace mitral regurgitation. Mild pulmonic valvular regurgitation. The mitral inflow pattern is consistent with Diastolic Dysfunction. 9/2020 Lexiscan-Myoview ([facility]): No perfusion abnormality. Decreased ejection fraction calculated at 34%. 9/2020 Echo ([facility]): The left ventricular function is moderately reduced. Overall left ventricular ejection fraction is estimated to be 40-45%. Diffuse hypokinesis with moderate hypokinesis of the basal and mid  inferolateral, basal inferior segments. No significant mitral regurgitation. Elevated mean left atrial pressure and left ventricular end diastolic  Pressure. Telemetry:  Sinus rhythm with rare PVC    Assessment/Plan:    1. Acute on chronic systolic heart failure/Cardiomyopathy  -LVEF 30-35%; NYHA class III  -admits to noncompliance with sodium and fluid restrictions  -remains on IV lasix; will change to po  -continue Toprol, imdur and diovan  -low sodium and fluid restriction  -follows with [facility] cardiology  -not on aldactone d/t elevated Cr  -not an ICD candidate: not on optimized GDMT x 90 days  -no SGLT2 inhibitor: d/t CKD; consider as outpt   -d/w Dr. Milla Taylor: would not pursue CardioMems at this juncture; he is very confused about it    2. Essential HTN  -well controlled   -goal BP < 140/80 (given advanced age and prior CVA)  -continue medical management    3. Chronic kidney disease, stage III  -Cr with some improvement today  -at his baseline    Increase ambulation. Change to po diuretic today. OK to discharge when okay with Primary team.  Needs to follow up with cardiology within 7 days of discharge (João).     Attempted to call and discuss with his son: No answer and did not accept incoming voicemail    Emphasized and discussed low-fat/low sodium diet, monitoring of daily weights, fluid restriction, worsening signs and symptoms of heart failure and when to call, and the importance of regular exercise and activity.     Discharge Cardiac Meds:  Eliquis 5 mg BID  ASA 81 mg daily  Lasix 40 mg daily  Imdur 30 mg daily  Toprol Xl 12.5 mg daily  Valsartan 40 mg daily      Electronically signed by YULIYA Alba CNP on 4/30/2021 at 11:12 AM

## 2021-04-30 NOTE — PROGRESS NOTES
Pharmacy Heart Failure Medication Reconciliation Note    Pt discharged from [facility] today after admission for acute on chronic systolic heart failure. Rhys Aponte has a diagnosis of systolic heart failure (last EF = 30-35 % on 03/28/21). Pertinent Labs:  BMP:   Lab Results   Component Value Date     04/30/2021    K 3.7 04/30/2021    K 4.9 04/28/2021     04/30/2021    CO2 25 04/30/2021    BUN 34 04/30/2021    CREATININE 1.7 04/30/2021     BNP:   Lab Results   Component Value Date    PROBNP 13,690 04/28/2021       Patient taking an ACEI / ARB / Entresto:  Yes - Valsartan 40 mg po daily  Patient taking a BETA BLOCKER:  Yes - Toprol XL 12.5 mg po daily  Patient taking a LOOP DIURETIC: Yes - Lasix 40 mg po daily  Patient taking a ALDOSTERONE RECEPTOR ANTAGONIST: No - due to CKD    Patient has a diagnosis of Atrial Fibrillation:Yes - patient is on Eliquis    Patient has a diagnosis of type 2 diabetes: Yes - No SGLT2 secondary to CKD      Corrections to discharge medications include:  none    Discharge Medications:         Medication List        START taking these medications      furosemide 40 MG tablet  Commonly known as: LASIX  Take 1 tablet by mouth daily  Start taking on: May 1, 2021     isosorbide mononitrate 30 MG extended release tablet  Commonly known as: IMDUR  Take 1 tablet by mouth daily  Start taking on:  May 1, 2021            CONTINUE taking these medications      allopurinol 100 MG tablet  Commonly known as: ZYLOPRIM     aspirin 81 MG chewable tablet     Eliquis 5 MG Tabs tablet  Generic drug: apixaban     metoprolol succinate 25 MG extended release tablet  Commonly known as: TOPROL XL     tamsulosin 0.4 MG capsule  Commonly known as: FLOMAX     valsartan 40 MG tablet  Commonly known as: DIOVAN     vitamin D 50 MCG (2000 UT) Caps capsule               Where to Get Your Medications        These medications were sent to [facility], [address] [place] [address],[address]  [address], [place]      Phone: [phone]   furosemide 40 MG tablet  isosorbide mononitrate 30 MG extended release tablet         BERONICA Zayas [facility]  Heart Failure Discharge Medication Reconciliation Program  [phone]

## 2021-04-30 NOTE — PLAN OF CARE
Problem: Pain:  Goal: Pain level will decrease  Description: Pain level will decrease  Outcome: Ongoing     Problem: Pain:  Goal: Control of acute pain  Description: Control of acute pain  Outcome: Ongoing     Problem: FLUID AND ELECTROLYTE IMBALANCE  Goal: Fluid and electrolyte balance are achieved/maintained  Outcome: Ongoing

## 2021-04-30 NOTE — DISCHARGE INSTR - DIET
For nutrition questions after discharge please call the Registered Dietitian at [phone]    Heart Failure Nutrition Therapy  This diet will help you feel better and support your heart by reducing symptoms of fluid retention, shortness of breath and swelling. You should focus on:   Limiting sodium in your diet by reading labels and limiting foods high in sodium. o Limit your daily sodium intake to 2,000 mg per day.  o Select foods with 140 mg of sodium or less per serving. o Foods with more than 300 mg of sodium per serving may not fit into a reduced-sodium meal plan.  o Check serving sizes. If you eat more than 1 serving, you will get more sodium than the amount listed.  Limiting fluid in your diet. o Ask your doctor how much fluid you can have per day  o Remember foods that are liquid at room temperature such as popsicles, soup, ice cream and Jell-O are fluids.  Checking your weight to make sure you're not retaining too much fluid.  o Weigh yourself every morning. If you gain 3 or more pounds in one day or 5 pounds within 1 week, call your doctor. Foods to choose and avoid:   Avoid processed foods. Eat more fresh foods. o Fresh and frozen fruits and vegetables are good choices. o Choose fresh meats. Avoid processed meats such as lebron, sausage and hot dogs.  Do not add salt to your food while cooking or at the table. o Try dry or fresh herbs, pepper, lemon juice, or a sodium-free seasoning blend such as Mrs. Dash to add flavor to food. o Do not use a salt substitute.  Use caution at restaurants  o Restaurant foods are high in sodium. Ask for your food to be cooked without salt and request sauces and dressing to come on the side.   Low sodium

## 2021-04-30 NOTE — CONSULTS
[facility]  HEART FAILURE PROGRAM      NAME:  Shae Langley  MEDICAL RECORD NUMBER:  [id number]  AGE: 80 y.o.    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    CHF (congestive heart failure) ([address]     TIA (transient ischemic attack)        SOCIAL HISTORY    Social History     Tobacco Use    Smoking status: Current Every Day Smoker     Types: Pipe   Substance Use Topics    Alcohol use: No    Drug use: No       ALLERGIES    No Known Allergies    MEDICATIONS  Scheduled Meds:   [START ON 2021] furosemide  40 mg Oral Daily    allopurinol  200 mg Oral Daily    apixaban  5 mg Oral BID    aspirin  81 mg Oral Daily    Vitamin D  2,000 Units Oral Daily    metoprolol succinate  12.5 mg Oral Daily    tamsulosin  0.4 mg Oral Nightly    valsartan  40 mg Oral Daily    sodium chloride flush  5-40 mL Intravenous 2 times per day    isosorbide mononitrate  30 mg Oral Daily       ADMIT DATE: 2021      Objective:     ADMISSION DIAGNOSIS:   Acute systolic heart failure (HCC) [I50.21]     /88   Pulse 75   Temp 97.6 °F (36.4 °C) (Oral)   Resp 16   Ht 5' 10\" (1.778 m)   Wt 130 lb 8.2 oz (59.2 kg)   SpO2 94%   BMI 18.73 kg/m²     ADMIT:  Weight: 148 lb 9.4 oz (67.4 kg)    TODAY: Weight: 130 lb 8.2 oz (59.2 kg)    Wt Readings from Last 25 Encounters:   21 130 lb 8.2 oz (59.2 kg)          Intake/Output Summary (Last 24 hours) at 2021 1731  Last data filed at 2021 0924  Gross per 24 hour   Intake 680 ml   Output --   Net 680 ml       LABS  BMP:   Lab Results   Component Value Date     2021    K 3.7 2021    K 4.9 2021     2021    CO2 25 2021    BUN 34 2021    LABALBU 3.9 2021    CREATININE 1.7 2021    CALCIUM 10.1 2021    GFRAA 46 red zone). We discussed the importance of self monitoring and EARLY report of concerns to most easily address issues. I urged him to not hesitate to call MD office as that is key to getting back into green zone as quickly as possible. He states \"I didn't know I could call them like that\". Pt shares his weight has slowly increased to 147# and then it kenneth to 152#. He shares he lost 5# \"just like that\" (snapping his fingers). He reports a usual weight of 140-142# but admits to not consistently weighing himself in the past.     We discussed the rationale of sodium and fluid restriction and the recommendations of each. Pt reports he does not use any added salt. He does a mix of cooking at home and take out. Pt lives alone but his four sons (one is a pharmacist) help him by stopping by frequently, taking him out to eat, bringing in food items. We discussed how some foods are inherently high in sodium even if he does avoid the actual salt shaker. Son says they have been reviewing sodium information and voiced his appreciation of food lists / how to read a food label information. He shares they plan to take a more active role in helping his shop / review food labels, etc. Praise given for good family support. Pt states he drinks coffee and then mostly water. We discussed having a container of water to \"spend\" from through out the day. They both agree this would likely be the easiest for him to self monitor. I also encouraged taking meds with applesauce, yogurt, pudding etc to free up fluids to drink throughout the day. Patient plans to do this. Pt reports compliance with meds. His children help him stay organized and on top of refills. He voices no barriers to affording / obtaining. We discussed timing of diuretic (how he can push back if he has an MD appt etc) or if he is instructed to take a second dose. He and son voice understanding.   We also discussed s/sx of DEhydration and importance of reporting these concerns as well. We reviewed the required 7 day follow up. Dr Ciaran Chen did not have availability within the 7 day window so PCP appt was also arranged. Son states he or one of his brothers will attend appts with pt. Son voices concern of not having cardiology appt until 5/17 but I reminded him there is on call / office availability by phone at all times. I also shared he (or his brothers) could call if they have any general questions. He voices his appreciation. Overall, pt was very engaged. He provided excellent insight into his daily habits and listened raptly to all information I presented. He voiced great appreciation and his surprise at being able to call his doctor at any time for advice / instruction. Pt has discharge order in place and is very anxious \"to get out of here\". He hopes to not return so he voices his willingness to \"do everything you told me\" to stay home / healthy. HF RNs will continue to follow and assist with transition of care at discharge. CURRENT DIET: DIET LOW SODIUM 2 GM; 1500 ml  Dietary Nutrition Supplements: Frozen Oral Supplement    EDUCATIONAL PACKETS PROVIDED- PRINTED FROM [email or web address]. Titles and material given:  Yes   [x]  What is Heart Failure?   [x]  Heart Failure: Warning Signs of a Flare-Up  [x]  Heart Failure: Making Changes to Your Diet  [x]  Heart Failure: Medications to Help Your Heart   [x]  Other: weight log, AHA HF zones sheet, [facility] [employer], [facility] HF Nutrition Guide     PATIENT/CAREGIVER TEACHING:    Level of patient/caregiver understanding able to:   [x] Verbalize understanding   [] Demonstrate understanding       [x] Teach back        [x] Needs reinforcement     []  Other:      TEACHING TIME:  50 minutes       Plan:       DISCHARGE PLAN:  Placement for patient upon discharge: home with support   Hospice Care:  no  Code Status: Full Code  Discharge appointment scheduled: Yes     RECOMMENDATIONS:   [x]  Encourage to call Heart Failure Resource Line with any questions or concerns. [x]  Educate further Mr. Fleming Dears on fluid restriction 48 oz- 64 oz during inpatient stay so he can understand how to measure intake at home. [x]  Continue to educate on S/S of Heart Failure. [x]  Emphasize daily weights, diet, and if changes, to call Heart Failure Resource Line  [x]  Other: Cardiac Rehab referral: pt agreeable to referral / electronic order placed / brochure provided - pt understands he must be cleared by cardiology prior to starting program  [address] referral: pt declines at this time. He wants to discuss with Dr Sihrley Duarte. He is aware he can enroll and /or use infusion services if ordered.            Electronically signed by Natalie Oh RN, BSN CHFN  on 4/30/2021 at 5:31 PM

## 2021-04-30 NOTE — CARE COORDINATION
DISCHARGE SUMMARY     DATE OF DISCHARGE: 04/30/2021    DISCHARGE DESTINATION: Home with home care    [address] [address]  Phone: [phone]  Fax: [phone]    TRANSPORTATION: Family will transport at d/c. Name:  [facility]  Relationship: [facility] up Time: TBD by patient, family and/or medical staff. Phone Number: [phone]    COMMENTS: SW met with patient and son at bedside today regarding discharge needs. Patient stated that he was independent, resides alone and doesn't have any psychosocial needs at this time. No further needs. Respectfully submitted,    ANKIT Perdomo  [facility]   [phone]    Electronically signed by ANKIT Reilly on 4/30/2021 at 4:26 PM

## 2021-04-30 NOTE — CONSULTS
Comprehensive Nutrition Assessment    Type and Reason for Visit:  Initial, Positive Nutrition Screen, Patient Education, Consult    Nutrition Recommendations/Plan:   - Continue current diet  - Begin Magic Cup BID    Nutrition Assessment:  RD consult for heart failure diet education. Pt sleeping upon multiple attempts made by RD. CHF diet education provided in discharge instructions. Positive nutrition screen for wt loss. Unable to assess as no wt hx provided in EMR. On low sodium diet with 1500ml fluid restriction. Recorded intakes %. Visually noted wasting on temples and buccal region. Unable to assess body wasting as pt was covered with blanket. Will trial magic cup BID and monitor for acceptance. Malnutrition Assessment:  Malnutrition Status: Moderate malnutrition    Context:  Chronic Illness     Findings of the 6 clinical characteristics of malnutrition:  Energy Intake:  No significant decrease in energy intake  Weight Loss:  Unable to assess     Body Fat Loss:  1 - Mild body fat loss Buccal region   Muscle Mass Loss:  1 - Mild muscle mass loss Temples (temporalis)  Fluid Accumulation:  No significant fluid accumulation     Strength:  Not Performed    Estimated Daily Nutrient Needs:  Energy (kcal):  9795-5220 (30-35kcal/59kg); Weight Used for Energy Requirements:  Current     Protein (g):  71-83 (1.2-1.4g/59kg); Weight Used for Protein Requirements:  Current        Fluid (ml/day):  1500ml fluid restriction; Method Used for Fluid Requirements:  Other (Comment)      Nutrition Related Findings:  No edema.       Wounds:  None       Current Nutrition Therapies:    DIET LOW SODIUM 2 GM; 1500 ml    Anthropometric Measures:  · Height: 5' 10\" (177.8 cm)  · Current Body Weight: 130 lb (59 kg)   · Admission Body Weight: 148 lb (67.1 kg)    · Ideal Body Weight: 166 lbs; % Ideal Body Weight 78.3 %   · BMI: 18.7   · BMI Categories: Underweight (BMI less than 22) age over 72       Nutrition Diagnosis: · Moderate malnutrition related to inadequate protein-energy intake as evidenced by mild loss of subcutaneous fat, mild muscle loss    Nutrition Interventions:   Food and/or Nutrient Delivery:  Continue Current Diet, Start Oral Nutrition Supplement  Nutrition Education/Counseling:  (CHF edu in dc instructions)   Coordination of Nutrition Care:  Continue to monitor while inpatient    Goals:  PO intake greater than 50%       Nutrition Monitoring and Evaluation:   Behavioral-Environmental Outcomes:  None Identified   Food/Nutrient Intake Outcomes:  Food and Nutrient Intake, Supplement Intake  Physical Signs/Symptoms Outcomes:  Weight, Nutrition Focused Physical Findings     Discharge Planning:     Too soon to determine     Electronically signed by Adria Lange RD, LD on 4/30/21 at 2:12 PM EDT    Contact: [phone]

## 2021-04-30 NOTE — PLAN OF CARE
Problem: OXYGENATION/RESPIRATORY FUNCTION  Goal: Patient will achieve/maintain normal respiratory rate/effort  Description: Respiratory rate and effort will be within normal limits for the patient  4/29/2021 2150 by Theo Lebron RN  Outcome: Ongoing  4/29/2021 1338 by Sachin Domingo RN  Outcome: Ongoing     Problem: ACTIVITY INTOLERANCE/IMPAIRED MOBILITY  Goal: Mobility/activity is maintained at optimum level for patient  4/29/2021 2150 by Theo Lebron RN  Outcome: Ongoing  4/29/2021 1338 by Sachin Domingo RN  Outcome: Ongoing     Problem: HEMODYNAMIC STATUS  Goal: Patient has stable vital signs and fluid balance  4/29/2021 2150 by Theo Lebron RN  Outcome: Ongoing  4/29/2021 1338 by Sachin Domingo RN  Outcome: Ongoing

## 2021-04-30 NOTE — DISCHARGE SUMMARY
Normal effort. Clear to auscultation,  Cardiovascular: Regular rate and rhythm with normal S1/S2 without murmurs, rubs or gallops. Abdomen: Soft, non-tender, non-distended with normal bowel sounds. Musculoskeletal: No clubbing, cyanosis or edema bilaterally. Full range of motion without deformity. Skin: Skin color, texture, turgor normal.  No rashes or lesions. Neurologic:  Neurovascularly intact without any focal sensory/motor deficits. Cranial nerves: II-XII intact, grossly non-focal.  Psychiatric: Alert and oriented, thought content appropriate, normal insight  Peripheral Pulses: +2 palpable, equal bilaterally       Labs: For convenience and continuity at follow-up the following most recent labs are provided:      CBC:    Lab Results   Component Value Date    WBC 6.9 04/29/2021    HGB 12.4 04/29/2021    HCT 37.5 04/29/2021     04/29/2021       Renal:    Lab Results   Component Value Date     04/30/2021    K 3.7 04/30/2021    K 4.9 04/28/2021     04/30/2021    CO2 25 04/30/2021    BUN 34 04/30/2021    CREATININE 1.7 04/30/2021    CALCIUM 10.1 04/30/2021         Significant Diagnostic Studies    Radiology:   XR CHEST PORTABLE   Final Result   Improved appearance the chest with decreased right pleural effusion, mild   residual.  Decreased right basilar atelectasis, mild residual.  No new   pulmonary findings, no acute abnormality elsewhere         XR CHEST PORTABLE   Final Result   Cardiomegaly with bilateral pleural effusions right greater than left           Echo [facility] 3/20/21    Overall LV ejection fraction is estimated to be 30-35%. Mild aortic regurgitation. moderate to severe global hypokinesis of the left ventricle. There is trace mitral regurgitation. Mild pulmonic valvular regurgitation. mitral inflow pattern is consistent with Diastolic Dysfunction  . Echo 9/2020  left ventricular ejection fraction is estimated to be 40-45%.   Diffuse hypokinesis with moderate hypokinesis of the basal and mid inferolateral, basal inferior segments     Consults:     IP CONSULT TO HEART FAILURE NURSE/COORDINATOR  IP CONSULT TO DIETITIAN  IP CONSULT TO CARDIOLOGY    Disposition:  home     Condition at Discharge: Stable    Discharge Instructions/Follow-up:  Your cardiologist in one week     Code Status:  Full Code     Activity: activity as tolerated    Diet: cardiac diet 1.5L fluid restriction low salt       Discharge Medications:     Current Discharge Medication List           Details   isosorbide mononitrate (IMDUR) 30 MG extended release tablet Take 1 tablet by mouth daily  Qty: 30 tablet, Refills: 0      furosemide (LASIX) 40 MG tablet Take 1 tablet by mouth daily  Qty: 30 tablet, Refills: 0              Details   apixaban (ELIQUIS) 5 MG TABS tablet Take 5 mg by mouth 2 times daily      metoprolol succinate (TOPROL XL) 25 MG extended release tablet Take 12.5 mg by mouth daily      tamsulosin (FLOMAX) 0.4 MG capsule Take 0.4 mg by mouth nightly      valsartan (DIOVAN) 40 MG tablet Take 40 mg by mouth daily      Cholecalciferol (VITAMIN D) 50 MCG ( UT) CAPS capsule Take 1 capsule by mouth daily      aspirin 81 MG chewable tablet Take 81 mg by mouth daily      allopurinol (ZYLOPRIM) 100 MG tablet Take 200 mg by mouth daily             Time Spent on discharge is 30 minutes  in the examination, evaluation, counseling and review of medications and discharge plan. Signed:    Loli Cordero MD   2021      Thank you Trish Apgar for the opportunity to be involved in this patient's care. If you have any questions or concerns please feel free to contact me at [phone].

## 2021-05-03 ENCOUNTER — FOLLOWUP TELEPHONE ENCOUNTER (OUTPATIENT)
Dept: INPATIENT UNIT | Age: 86
End: 2021-05-03

## 2021-05-03 NOTE — PROGRESS NOTES
Called patient for 72 hour HF hospital follow up. He answered phone and recalled me from our conversation on Friday before he discharged. Pt states he is doing well. He \"read all the stuff on the papers you gave me and I don't have anything to report to you\". Praise given for material review and self assessment. Pt weighed this AM and reports 133# - unchanged from his DRY weight on home scale. He also reports he is adhering to low sodium / fluid restricted diet. We discussed his 7 day follow up with PCP and then cardiology follow up. He states his son, Miracle Mejia all that and knows when we are going\". I urged pt / family to call with any questions but reiterated importance of calling Dr Ehsan Ferguson office with any flare / medication issues. He voices understanding of importance and has all contact numbers.

## 2022-12-17 ENCOUNTER — HOSPITAL ENCOUNTER (INPATIENT)
Age: 87
LOS: 4 days | Discharge: HOME | DRG: 291 | End: 2022-12-21
Attending: EMERGENCY MEDICINE | Admitting: HOSPITALIST
Payer: MEDICARE

## 2022-12-17 ENCOUNTER — APPOINTMENT (OUTPATIENT)
Dept: GENERAL RADIOLOGY | Age: 87
DRG: 291 | End: 2022-12-17
Payer: MEDICARE

## 2022-12-17 DIAGNOSIS — J90 PLEURAL EFFUSION: ICD-10-CM

## 2022-12-17 DIAGNOSIS — K59.00 CONSTIPATION, UNSPECIFIED CONSTIPATION TYPE: ICD-10-CM

## 2022-12-17 DIAGNOSIS — R77.8 ELEVATED TROPONIN: ICD-10-CM

## 2022-12-17 DIAGNOSIS — F17.290 PIPE SMOKER: ICD-10-CM

## 2022-12-17 DIAGNOSIS — Z71.89 GOALS OF CARE, COUNSELING/DISCUSSION: ICD-10-CM

## 2022-12-17 DIAGNOSIS — N17.9 AKI (ACUTE KIDNEY INJURY) (HCC): ICD-10-CM

## 2022-12-17 DIAGNOSIS — I50.43 ACUTE ON CHRONIC COMBINED SYSTOLIC AND DIASTOLIC CONGESTIVE HEART FAILURE (HCC): Primary | ICD-10-CM

## 2022-12-17 PROBLEM — R06.02 SOB (SHORTNESS OF BREATH): Status: ACTIVE | Noted: 2022-12-17

## 2022-12-17 LAB
A/G RATIO: 1.2 (ref 1.1–2.2)
ALBUMIN SERPL-MCNC: 3.9 G/DL (ref 3.4–5)
ALP BLD-CCNC: 76 U/L (ref 40–129)
ALT SERPL-CCNC: 24 U/L (ref 10–40)
ANION GAP SERPL CALCULATED.3IONS-SCNC: 14 MMOL/L (ref 3–16)
APTT: 35.2 SEC (ref 23–34.3)
AST SERPL-CCNC: 26 U/L (ref 15–37)
BASOPHILS ABSOLUTE: 0.1 K/UL (ref 0–0.2)
BASOPHILS RELATIVE PERCENT: 0.4 %
BILIRUB SERPL-MCNC: 0.7 MG/DL (ref 0–1)
BUN BLDV-MCNC: 53 MG/DL (ref 7–20)
CALCIUM SERPL-MCNC: 11.1 MG/DL (ref 8.3–10.6)
CHLORIDE BLD-SCNC: 106 MMOL/L (ref 99–110)
CO2: 21 MMOL/L (ref 21–32)
CREAT SERPL-MCNC: 2.1 MG/DL (ref 0.8–1.3)
EOSINOPHILS ABSOLUTE: 0 K/UL (ref 0–0.6)
EOSINOPHILS RELATIVE PERCENT: 0 %
GFR SERPL CREATININE-BSD FRML MDRD: 29 ML/MIN/{1.73_M2}
GLUCOSE BLD-MCNC: 128 MG/DL (ref 70–99)
HCT VFR BLD CALC: 41.4 % (ref 40.5–52.5)
HEMOGLOBIN: 13.4 G/DL (ref 13.5–17.5)
IRON SATURATION: 12 % (ref 20–50)
IRON: 29 UG/DL (ref 59–158)
LYMPHOCYTES ABSOLUTE: 0.5 K/UL (ref 1–5.1)
LYMPHOCYTES RELATIVE PERCENT: 4.2 %
MCH RBC QN AUTO: 32.3 PG (ref 26–34)
MCHC RBC AUTO-ENTMCNC: 32.5 G/DL (ref 31–36)
MCV RBC AUTO: 99.2 FL (ref 80–100)
MONOCYTES ABSOLUTE: 0.7 K/UL (ref 0–1.3)
MONOCYTES RELATIVE PERCENT: 5.9 %
NEUTROPHILS ABSOLUTE: 11 K/UL (ref 1.7–7.7)
NEUTROPHILS RELATIVE PERCENT: 89.5 %
PDW BLD-RTO: 15.4 % (ref 12.4–15.4)
PLATELET # BLD: 230 K/UL (ref 135–450)
PMV BLD AUTO: 8.2 FL (ref 5–10.5)
POTASSIUM SERPL-SCNC: 4.4 MMOL/L (ref 3.5–5.1)
PRO-BNP: ABNORMAL PG/ML (ref 0–449)
RAPID INFLUENZA  B AGN: NEGATIVE
RAPID INFLUENZA A AGN: NEGATIVE
RBC # BLD: 4.17 M/UL (ref 4.2–5.9)
SARS-COV-2, NAAT: NOT DETECTED
SODIUM BLD-SCNC: 141 MMOL/L (ref 136–145)
TOTAL IRON BINDING CAPACITY: 237 UG/DL (ref 260–445)
TOTAL PROTEIN: 7.1 G/DL (ref 6.4–8.2)
TROPONIN: 0.29 NG/ML
TROPONIN: 0.3 NG/ML
TSH SERPL DL<=0.05 MIU/L-ACNC: 5.33 UIU/ML (ref 0.27–4.2)
WBC # BLD: 12.3 K/UL (ref 4–11)

## 2022-12-17 PROCEDURE — 1200000000 HC SEMI PRIVATE

## 2022-12-17 PROCEDURE — 85730 THROMBOPLASTIN TIME PARTIAL: CPT

## 2022-12-17 PROCEDURE — 94640 AIRWAY INHALATION TREATMENT: CPT

## 2022-12-17 PROCEDURE — 71046 X-RAY EXAM CHEST 2 VIEWS: CPT

## 2022-12-17 PROCEDURE — 83880 ASSAY OF NATRIURETIC PEPTIDE: CPT

## 2022-12-17 PROCEDURE — 84484 ASSAY OF TROPONIN QUANT: CPT

## 2022-12-17 PROCEDURE — 6370000000 HC RX 637 (ALT 250 FOR IP): Performed by: HOSPITALIST

## 2022-12-17 PROCEDURE — 99285 EMERGENCY DEPT VISIT HI MDM: CPT

## 2022-12-17 PROCEDURE — 94760 N-INVAS EAR/PLS OXIMETRY 1: CPT

## 2022-12-17 PROCEDURE — 87635 SARS-COV-2 COVID-19 AMP PRB: CPT

## 2022-12-17 PROCEDURE — 51798 US URINE CAPACITY MEASURE: CPT

## 2022-12-17 PROCEDURE — 93005 ELECTROCARDIOGRAM TRACING: CPT | Performed by: NURSE PRACTITIONER

## 2022-12-17 PROCEDURE — 84443 ASSAY THYROID STIM HORMONE: CPT

## 2022-12-17 PROCEDURE — 83540 ASSAY OF IRON: CPT

## 2022-12-17 PROCEDURE — 83550 IRON BINDING TEST: CPT

## 2022-12-17 PROCEDURE — 96374 THER/PROPH/DIAG INJ IV PUSH: CPT

## 2022-12-17 PROCEDURE — 80053 COMPREHEN METABOLIC PANEL: CPT

## 2022-12-17 PROCEDURE — 6360000002 HC RX W HCPCS: Performed by: NURSE PRACTITIONER

## 2022-12-17 PROCEDURE — 6370000000 HC RX 637 (ALT 250 FOR IP): Performed by: NURSE PRACTITIONER

## 2022-12-17 PROCEDURE — 87804 INFLUENZA ASSAY W/OPTIC: CPT

## 2022-12-17 PROCEDURE — 85025 COMPLETE CBC W/AUTO DIFF WBC: CPT

## 2022-12-17 PROCEDURE — 36415 COLL VENOUS BLD VENIPUNCTURE: CPT

## 2022-12-17 PROCEDURE — 2580000003 HC RX 258: Performed by: HOSPITALIST

## 2022-12-17 PROCEDURE — 6360000002 HC RX W HCPCS: Performed by: HOSPITALIST

## 2022-12-17 RX ORDER — IPRATROPIUM BROMIDE AND ALBUTEROL SULFATE 2.5; .5 MG/3ML; MG/3ML
1 SOLUTION RESPIRATORY (INHALATION) ONCE
Status: COMPLETED | OUTPATIENT
Start: 2022-12-17 | End: 2022-12-17

## 2022-12-17 RX ORDER — POTASSIUM CHLORIDE 20 MEQ/1
40 TABLET, EXTENDED RELEASE ORAL PRN
Status: DISCONTINUED | OUTPATIENT
Start: 2022-12-17 | End: 2022-12-17

## 2022-12-17 RX ORDER — VALSARTAN 80 MG/1
40 TABLET ORAL DAILY
Status: DISCONTINUED | OUTPATIENT
Start: 2022-12-18 | End: 2022-12-18

## 2022-12-17 RX ORDER — FUROSEMIDE 10 MG/ML
80 INJECTION INTRAMUSCULAR; INTRAVENOUS ONCE
Status: COMPLETED | OUTPATIENT
Start: 2022-12-17 | End: 2022-12-17

## 2022-12-17 RX ORDER — POLYETHYLENE GLYCOL 3350 17 G/17G
17 POWDER, FOR SOLUTION ORAL ONCE
Status: DISCONTINUED | OUTPATIENT
Start: 2022-12-17 | End: 2022-12-21 | Stop reason: HOSPADM

## 2022-12-17 RX ORDER — SODIUM CHLORIDE 9 MG/ML
INJECTION, SOLUTION INTRAVENOUS PRN
Status: DISCONTINUED | OUTPATIENT
Start: 2022-12-17 | End: 2022-12-21 | Stop reason: HOSPADM

## 2022-12-17 RX ORDER — ACETAMINOPHEN 650 MG/1
650 SUPPOSITORY RECTAL EVERY 6 HOURS PRN
Status: DISCONTINUED | OUTPATIENT
Start: 2022-12-17 | End: 2022-12-21 | Stop reason: HOSPADM

## 2022-12-17 RX ORDER — ASPIRIN 81 MG/1
81 TABLET, CHEWABLE ORAL DAILY
Status: DISCONTINUED | OUTPATIENT
Start: 2022-12-18 | End: 2022-12-19

## 2022-12-17 RX ORDER — TAMSULOSIN HYDROCHLORIDE 0.4 MG/1
0.4 CAPSULE ORAL NIGHTLY
Status: DISCONTINUED | OUTPATIENT
Start: 2022-12-17 | End: 2022-12-21 | Stop reason: HOSPADM

## 2022-12-17 RX ORDER — METOPROLOL SUCCINATE 25 MG/1
25 TABLET, EXTENDED RELEASE ORAL DAILY
Status: CANCELLED | OUTPATIENT
Start: 2022-12-17

## 2022-12-17 RX ORDER — FAMOTIDINE 20 MG/1
20 TABLET, FILM COATED ORAL NIGHTLY
Status: DISCONTINUED | OUTPATIENT
Start: 2022-12-17 | End: 2022-12-21 | Stop reason: HOSPADM

## 2022-12-17 RX ORDER — SODIUM CHLORIDE 0.9 % (FLUSH) 0.9 %
5-40 SYRINGE (ML) INJECTION PRN
Status: DISCONTINUED | OUTPATIENT
Start: 2022-12-17 | End: 2022-12-21 | Stop reason: HOSPADM

## 2022-12-17 RX ORDER — ENOXAPARIN SODIUM 100 MG/ML
40 INJECTION SUBCUTANEOUS DAILY
Status: CANCELLED | OUTPATIENT
Start: 2022-12-17

## 2022-12-17 RX ORDER — ISOSORBIDE MONONITRATE 30 MG/1
30 TABLET, EXTENDED RELEASE ORAL DAILY
Status: DISCONTINUED | OUTPATIENT
Start: 2022-12-18 | End: 2022-12-19

## 2022-12-17 RX ORDER — LISINOPRIL 5 MG/1
5 TABLET ORAL DAILY
Status: CANCELLED | OUTPATIENT
Start: 2022-12-17

## 2022-12-17 RX ORDER — ONDANSETRON 4 MG/1
4 TABLET, ORALLY DISINTEGRATING ORAL EVERY 8 HOURS PRN
Status: DISCONTINUED | OUTPATIENT
Start: 2022-12-17 | End: 2022-12-21 | Stop reason: HOSPADM

## 2022-12-17 RX ORDER — VITAMIN B COMPLEX
2000 TABLET ORAL DAILY
Status: DISCONTINUED | OUTPATIENT
Start: 2022-12-18 | End: 2022-12-21 | Stop reason: HOSPADM

## 2022-12-17 RX ORDER — ALLOPURINOL 100 MG/1
200 TABLET ORAL DAILY
Status: DISCONTINUED | OUTPATIENT
Start: 2022-12-18 | End: 2022-12-21 | Stop reason: HOSPADM

## 2022-12-17 RX ORDER — METOPROLOL SUCCINATE 25 MG/1
12.5 TABLET, EXTENDED RELEASE ORAL DAILY
Status: DISCONTINUED | OUTPATIENT
Start: 2022-12-18 | End: 2022-12-19

## 2022-12-17 RX ORDER — POTASSIUM CHLORIDE 7.45 MG/ML
10 INJECTION INTRAVENOUS PRN
Status: DISCONTINUED | OUTPATIENT
Start: 2022-12-17 | End: 2022-12-17

## 2022-12-17 RX ORDER — POLYETHYLENE GLYCOL 3350 17 G/17G
17 POWDER, FOR SOLUTION ORAL DAILY PRN
Status: DISCONTINUED | OUTPATIENT
Start: 2022-12-17 | End: 2022-12-21 | Stop reason: HOSPADM

## 2022-12-17 RX ORDER — IPRATROPIUM BROMIDE AND ALBUTEROL SULFATE 2.5; .5 MG/3ML; MG/3ML
1 SOLUTION RESPIRATORY (INHALATION)
Status: DISCONTINUED | OUTPATIENT
Start: 2022-12-18 | End: 2022-12-18

## 2022-12-17 RX ORDER — ONDANSETRON 2 MG/ML
4 INJECTION INTRAMUSCULAR; INTRAVENOUS EVERY 6 HOURS PRN
Status: DISCONTINUED | OUTPATIENT
Start: 2022-12-17 | End: 2022-12-21 | Stop reason: HOSPADM

## 2022-12-17 RX ORDER — FUROSEMIDE 10 MG/ML
40 INJECTION INTRAMUSCULAR; INTRAVENOUS 2 TIMES DAILY
Status: DISCONTINUED | OUTPATIENT
Start: 2022-12-17 | End: 2022-12-18

## 2022-12-17 RX ORDER — ACETAMINOPHEN 325 MG/1
650 TABLET ORAL EVERY 6 HOURS PRN
Status: DISCONTINUED | OUTPATIENT
Start: 2022-12-17 | End: 2022-12-21 | Stop reason: HOSPADM

## 2022-12-17 RX ORDER — SODIUM CHLORIDE 0.9 % (FLUSH) 0.9 %
5-40 SYRINGE (ML) INJECTION EVERY 12 HOURS SCHEDULED
Status: DISCONTINUED | OUTPATIENT
Start: 2022-12-17 | End: 2022-12-21 | Stop reason: HOSPADM

## 2022-12-17 RX ORDER — MAGNESIUM SULFATE IN WATER 40 MG/ML
2000 INJECTION, SOLUTION INTRAVENOUS PRN
Status: DISCONTINUED | OUTPATIENT
Start: 2022-12-17 | End: 2022-12-17

## 2022-12-17 RX ADMIN — FUROSEMIDE 80 MG: 10 INJECTION, SOLUTION INTRAMUSCULAR; INTRAVENOUS at 16:51

## 2022-12-17 RX ADMIN — APIXABAN 5 MG: 5 TABLET, FILM COATED ORAL at 21:31

## 2022-12-17 RX ADMIN — IPRATROPIUM BROMIDE AND ALBUTEROL SULFATE 1 AMPULE: .5; 3 SOLUTION RESPIRATORY (INHALATION) at 13:43

## 2022-12-17 RX ADMIN — FUROSEMIDE 40 MG: 10 INJECTION, SOLUTION INTRAMUSCULAR; INTRAVENOUS at 19:00

## 2022-12-17 RX ADMIN — FAMOTIDINE 20 MG: 20 TABLET, FILM COATED ORAL at 21:31

## 2022-12-17 RX ADMIN — TAMSULOSIN HYDROCHLORIDE 0.4 MG: 0.4 CAPSULE ORAL at 21:31

## 2022-12-17 RX ADMIN — SODIUM CHLORIDE, PRESERVATIVE FREE 10 ML: 5 INJECTION INTRAVENOUS at 21:31

## 2022-12-17 ASSESSMENT — PAIN - FUNCTIONAL ASSESSMENT: PAIN_FUNCTIONAL_ASSESSMENT: NONE - DENIES PAIN

## 2022-12-17 ASSESSMENT — PAIN SCALES - GENERAL: PAINLEVEL_OUTOF10: 2

## 2022-12-17 NOTE — ED PROVIDER NOTES
Attending Supervisory Note/Shared Visit   I have personally performed a face to face diagnostic evaluation on this patient. I have reviewed the mid-levels findings and agree. History and Exam by me shows an alert elderly male in no acute distress. Complains of increasing difficulty breathing for the last 2 weeks. He has a history of congestive heart failure. He takes Lasix every day. He takes extra Lasix on a as needed basis. He thinks he may have taken 2 extra doses this last week. He denies chest pain. General: Alert elderly male in no acute distress. Heart: Regular rate and rhythm. Positive S3 gallop noted. Lungs: Breath sounds diffusely decreased in the right chest.  Rales in the left base. No wheezes. Abdomen: Soft, nondistended, nontender. Musculoskeletal: No lower extremity edema. No calf tenderness. EKG: Sinus rhythm, rate of 95, occasional PVCs, right bundle branch block, left anterior fascicular block. Rhythm strip shows a sinus rhythm with a rate of 95, OK interval 160 ms,  ms with occasional unifocal PVC, no other ectopy as interpreted by me. This is compared to an EKG dated 4/28/2021, previously noted anterior lateral ischemic changes less pronounced. No other significant changes noted. Lab reviewed. H&H of 13.4 and 41.4. White blood count 12,300 with 90 neutrophils and 4 lymphs. Electrolytes normal.  BUN of 53 with a creatinine of 2.1. Glucose of 128. Troponin of 0.30. BNP of greater than 70,000. COVID-negative. Flu negative. XR CHEST (2 VW)   Final Result   Mild pulmonary edema with moderate right pleural effusion. Clinically the patient has some congestive heart failure. Radiographically he has heart failure. His BNP is 70,000. He was given Lasix 80 mg IV. Hospitalist will be consulted for admission for further evaluation and treatment. Test results, diagnosis, and treatment plan were discussed with the patient and his son.   They understand the treatment plan and need for admission and are agreeable. 1. Acute on chronic combined systolic and diastolic congestive heart failure ([facility]    2. Goals of care, counseling/discussion    3. HOLLI (acute kidney injury) ([facility]    4. Pipe smoker    5. Constipation, unspecified constipation type    6. Elevated troponin    7.  Pleural effusion      Disposition:  Admit      (Please note that portions of this note were completed with a voice recognition program.  Efforts were made to edit the dictations but occasionally words are mis-transcribed.)    Madeleine Doss MD  Attending Emergency Physician        Cal Jacques MD  12/17/22 8239

## 2022-12-17 NOTE — ED NOTES
ED SBAR report provider to [facility]. Patient to be transported to Room 4256 via stretcher by transport tech. Patient transported with bedside cardiac monitor and with IV medications infusing. IV site clean, dry, and intact. MEWS score and pain assessed as 0/10 and documented. Updated patient and family on plan of care.      Anjel Bedoya RN  12/17/22 9461

## 2022-12-17 NOTE — ED PROVIDER NOTES
[address]  [address] [address]  Dept: [phone]  Loc: [address] COMPLAINT    Chief Complaint   Patient presents with    Shortness of Breath     SOB x2 weeks. Mucous membranes appear to be dry. Hx CHF    Abdominal Pain     Lower abd pain with constipated x 1 week       HPI    Ramón Sweeney is a 80 y.o. male with a history of skin cancer, TIA, DVT, PE taking Eliquis, heart failure, CKD, hypertension who presents to the emergency department with a 2-week complaint of shortness of breath. He states he has an occasional cough that is dry and nonproductive. He denies lightheadedness, dizziness, chest pain, nausea, vomiting, body aches, fever, chills. He does have a history of a left leg DVT many years ago. He is not anticoagulated currently. He denies cigarette smoking but he does smoke a pipe daily. He is also reporting that he has not had a bowel movement in a week. He denies abdominal pain. He does have a history of constipation issues. He denies difficulty with urination. He is here with his son. He is taking furosemide daily he has taken a couple of additional doses about 3 times this week. He does not weigh himself. REVIEW OF SYSTEMS   Cardiac: no chest pain, no palpitations, no syncope  Respiratory: see HPI, + occasional cough, + SOB  Neurologic: no syncope or LOC  GI: no vomiting, no diarrhea  General: denies fever  All other systems reviewed and are negative. PAST MEDICAL & SURGICAL HISTORY    Past Medical History:   Diagnosis Date    Cancer ([facility]     skin on head    CHF (congestive heart failure) ([facility] [address]     TIA (transient ischemic attack) 2010     No past surgical history on file.     CURRENT MEDICATIONS  (may include discharge medications prescribed in the ED)  REM      ALLERGIES    No Known Allergies    SOCIAL & FAMILY HISTORY    Social History Socioeconomic History    Marital status:    Tobacco Use    Smoking status: Every Day     Types: Pipe   Substance and Sexual Activity    Alcohol use: No    Drug use: No    Sexual activity: Not Currently     No family history on file. PHYSICAL EXAM    VITAL SIGNS: /69   Pulse 92   Temp 97.9 °F (36.6 °C) (Oral)   Resp 22   Ht 5' 10\" (1.778 m)   Wt 152 lb 12.5 oz (69.3 kg)   SpO2 99%   BMI 21.92 kg/m²    Constitutional:  Well developed, well nourished, no acute distress   HENT:  Atraumatic, moist mucus membranes  Neck: supple, no JVD   Respiratory: Respirations are even and nonlabored. He is tachypneic at about 25 times per minute. No distress. No retractions. Cough is dry. He has fine crackles in the bases  Cardiovascular: Tachycardic rate with regular rhythm. S1-S2. No murmurs  Vascular: Radial and DP pulses 2+ and equal bilaterally  GI:  Soft, nontender, nondistended abdomen without rebound or guarding normal bowel sounds  Musculoskeletal:  no lower extremity edema, no lower extremity asymmetry, no calf tenderness, no thigh tenderness, no acute deformities  Integument:  Skin is warm and dry, no petechiae   Neurologic:  Alert & oriented, no slurred speech  Psych: Pleasant affect, no hallucinations    EKG    Ventricular rate 95 bpm, MA interval 162 ms, QRS duration 166 ms,  ms  No ST elevation or depression. Interpretation is a sinus rhythm with PVCs and a bifascicular block    RADIOLOGY/PROCEDURES    XR CHEST (2 VW)   Final Result   Mild pulmonary edema with moderate right pleural effusion.            Labs Reviewed   CBC WITH AUTO DIFFERENTIAL - Abnormal; Notable for the following components:       Result Value    WBC 12.3 (*)     RBC 4.17 (*)     Hemoglobin 13.4 (*)     Neutrophils Absolute 11.0 (*)     Lymphocytes Absolute 0.5 (*)     All other components within normal limits   COMPREHENSIVE METABOLIC PANEL - Abnormal; Notable for the following components:    Glucose 128 (*) BUN 53 (*)     Creatinine 2.1 (*)     Est, Glom Filt Rate 29 (*)     Calcium 11.1 (*)     All other components within normal limits   TROPONIN - Abnormal; Notable for the following components:    Troponin 0.30 (*)     All other components within normal limits   BRAIN NATRIURETIC PEPTIDE - Abnormal; Notable for the following components:    Pro-BNP >70,000 (*)     All other components within normal limits   APTT - Abnormal; Notable for the following components:    aPTT 35.2 (*)     All other components within normal limits   TSH - Abnormal; Notable for the following components:    TSH 5.33 (*)     All other components within normal limits    Narrative:     Collection has been rescheduled by RUTAM at 12/17/2022 18:35 Reason: Add                   on                  Collection has been rescheduled by RUTAM at 12/17/2022 19:05 Reason: Add                   on   IRON AND TIBC - Abnormal; Notable for the following components:    Iron 29 (*)     TIBC 237 (*)     Iron Saturation 12 (*)     All other components within normal limits    Narrative:     Collection has been rescheduled by RUTAM at 12/17/2022 18:35 Reason: Add                   on                  Collection has been rescheduled by RUTAM at 12/17/2022 19:05 Reason: Add                   on   RAPID INFLUENZA A/B ANTIGENS   COVID-19, RAPID   BASIC METABOLIC PANEL   MAGNESIUM   CBC WITH AUTO DIFFERENTIAL   PROTIME-INR   TROPONIN   TROPONIN   LIPID PANEL       ED COURSE & MEDICAL DECISION MAKING    Pertinent Labs & Imaging studies reviewed and interpreted. (See chart for details)    See chart for details of medications given during the ED stay.     Vitals:    12/17/22 1345 12/17/22 1400 12/17/22 1735 12/17/22 1830   BP: 108/78 109/82 (!) 121/99 100/69   Pulse: 97 96 (!) 106 92   Resp: 12 26 24 22   Temp:   97.7 °F (36.5 °C) 97.9 °F (36.6 °C)   TempSrc:   Oral Oral   SpO2: 97% 94% 95% 99%   Weight:       Height:         Medications   polyethylene glycol (GLYCOLAX) packet 17 g (17 g Oral Not Given 12/17/22 1841)   allopurinol (ZYLOPRIM) tablet 200 mg (has no administration in time range)   apixaban (ELIQUIS) tablet 5 mg (has no administration in time range)   aspirin chewable tablet 81 mg (has no administration in time range)   Vitamin D (CHOLECALCIFEROL) tablet 2,000 Units (has no administration in time range)   isosorbide mononitrate (IMDUR) extended release tablet 30 mg (has no administration in time range)   metoprolol succinate (TOPROL XL) extended release tablet 12.5 mg (has no administration in time range)   tamsulosin (FLOMAX) capsule 0.4 mg (has no administration in time range)   valsartan (DIOVAN) tablet 40 mg (has no administration in time range)   sodium chloride flush 0.9 % injection 5-40 mL (has no administration in time range)   sodium chloride flush 0.9 % injection 5-40 mL (has no administration in time range)   0.9 % sodium chloride infusion (has no administration in time range)   famotidine (PEPCID) tablet 20 mg (has no administration in time range)   ondansetron (ZOFRAN-ODT) disintegrating tablet 4 mg (has no administration in time range)     Or   ondansetron (ZOFRAN) injection 4 mg (has no administration in time range)   polyethylene glycol (GLYCOLAX) packet 17 g (has no administration in time range)   acetaminophen (TYLENOL) tablet 650 mg (has no administration in time range)     Or   acetaminophen (TYLENOL) suppository 650 mg (has no administration in time range)   furosemide (LASIX) injection 40 mg (40 mg IntraVENous Given 12/17/22 1900)   ipratropium-albuterol (DUONEB) nebulizer solution 1 ampule (1 ampule Inhalation Given 12/17/22 1343)   furosemide (LASIX) injection 80 mg (80 mg IntraVENous Given 12/17/22 1651)     This patient was seen evaluated by myself and my attending physician Dr. Agnieszka Sewell    Differential diagnosis includes but is not limited to influenza, COVID, pneumonia, heart failure, pulmonary edema, bronchitis, URI, ACS, other    He is nontoxic in appearance and initially mildly tachycardic with a heart rate of 103 bpm.  He is comfortable breathing at rest but with slight exertion he goes up into the mid 20s. No distress or retractions though. He is not hypoxic. He is afebrile. He has a history of heart failure. He is taking his Lasix daily and had 3 or 4 additional doses this week. His symptoms have not improved with the extra Lasix. He has a white count of 12.3 with a H&H of 13.4 and 41.4    BUN 53, creatinine 2.1 he does have a history of stage III CKD. In October 2021 his creatinine was last documented in the system at 1.7    His proBNP is greater than 70,000. Again he is in no respiratory distress. He has no peripheral edema. His cough sounds dry. He does have some crackles in the bases. Troponin elevated at 0.30. He has no chest pain. No acute changes on EKG. Negative COVID    Negative influenza    Chest x-ray interpreted by radiology reviewed by myself showed  Mild pulmonary edema with moderate right pleural effusion. Will be given MiraLAX for his constipation. I spent greater than 3 minutes discussing the importance and health benefits of quitting smoking his pipe. At this point in his life he is not interested in quitting smoking. Goals of care discussed with the patient and his son. The patient would like to be a full code. He wants chest compressions, emergency medications, cardiac shocks and intubation. The son at the bedside is the power of  for the patient and he is on board with the plan. Perfect serve to the hospitalist service for admission. CRITICAL CARE NOTE:  There was a high probability of clinically significant life-threatening deterioration of the patient's condition requiring my urgent intervention. I personally saw the patient and independently provided 22 minutes of non-concurrent critical care out of the total shared critical care time provided.     This includes multiple reevaluations, vital sign monitoring, pulse oximetry monitoring, telemetry monitoring, clinical response to the IV medications, reviewing the nursing notes, consultation time, dictation/documentation time, and interpretation of the labwork. (This time excludes time spent performing procedures). FINAL IMPRESSION    1. Acute on chronic combined systolic and diastolic congestive heart failure ([facility] [address]    2. Goals of care, counseling/discussion    3. HOLLI (acute kidney injury) ([facility] [address]    4. Pipe smoker    5. Constipation, unspecified constipation type    6. Elevated troponin    7.  Pleural effusion        PLAN  Admission to the hospital    (Please note that this note was completed with a voice recognition program.  Every attempt was made to edit the dictations, but inevitably there remain words that are mis-transcribed.)        YULIYA Nolasco - CNP  12/17/22 1501 YULIYA Mcginnis - [place]  12/17/22 2035

## 2022-12-17 NOTE — PROGRESS NOTES
Pharmacy Medication Reconciliation Note     List of medications Starr Pollard is currently taking is complete. Source of information:   1. Conversation with family at bedside  2. EMR    Notes regarding home medications:   1. Patient has received all morning medications PTA in the ED  2.  Patient is anticoagulated on Eliquis 2.5 mg BID-- last dose this morning    Patient denies taking any OTC or herbal medications other than those listed    Taina Frank, Pharmacy Intern  12/17/2022  5:25 PM

## 2022-12-18 LAB
ANION GAP SERPL CALCULATED.3IONS-SCNC: 16 MMOL/L (ref 3–16)
BACTERIA: ABNORMAL /HPF
BASOPHILS ABSOLUTE: 0.1 K/UL (ref 0–0.2)
BASOPHILS RELATIVE PERCENT: 0.7 %
BILIRUBIN URINE: NEGATIVE
BLOOD, URINE: ABNORMAL
BUN BLDV-MCNC: 58 MG/DL (ref 7–20)
CALCIUM SERPL-MCNC: 10.5 MG/DL (ref 8.3–10.6)
CHLORIDE BLD-SCNC: 103 MMOL/L (ref 99–110)
CHOLESTEROL, TOTAL: 146 MG/DL (ref 0–199)
CLARITY: ABNORMAL
CO2: 19 MMOL/L (ref 21–32)
COLOR: YELLOW
CREAT SERPL-MCNC: 2.3 MG/DL (ref 0.8–1.3)
EKG ATRIAL RATE: 95 BPM
EKG DIAGNOSIS: NORMAL
EKG P AXIS: -2 DEGREES
EKG P-R INTERVAL: 162 MS
EKG Q-T INTERVAL: 418 MS
EKG QRS DURATION: 166 MS
EKG QTC CALCULATION (BAZETT): 525 MS
EKG R AXIS: -63 DEGREES
EKG T AXIS: 66 DEGREES
EKG VENTRICULAR RATE: 95 BPM
EOSINOPHILS ABSOLUTE: 0 K/UL (ref 0–0.6)
EOSINOPHILS RELATIVE PERCENT: 0.2 %
EPITHELIAL CELLS, UA: 1 /HPF (ref 0–5)
GFR SERPL CREATININE-BSD FRML MDRD: 26 ML/MIN/{1.73_M2}
GLUCOSE BLD-MCNC: 161 MG/DL (ref 70–99)
GLUCOSE URINE: NEGATIVE MG/DL
HCT VFR BLD CALC: 41.3 % (ref 40.5–52.5)
HDLC SERPL-MCNC: 33 MG/DL (ref 40–60)
HEMOGLOBIN: 13.4 G/DL (ref 13.5–17.5)
HYALINE CASTS: 9 /LPF (ref 0–8)
INR BLD: 1.76 (ref 0.87–1.14)
KETONES, URINE: NEGATIVE MG/DL
LACTIC ACID: 1.7 MMOL/L (ref 0.4–2)
LDL CHOLESTEROL CALCULATED: 84 MG/DL
LEUKOCYTE ESTERASE, URINE: ABNORMAL
LYMPHOCYTES ABSOLUTE: 0.7 K/UL (ref 1–5.1)
LYMPHOCYTES RELATIVE PERCENT: 7.2 %
MAGNESIUM: 1.9 MG/DL (ref 1.8–2.4)
MCH RBC QN AUTO: 32.1 PG (ref 26–34)
MCHC RBC AUTO-ENTMCNC: 32.5 G/DL (ref 31–36)
MCV RBC AUTO: 98.7 FL (ref 80–100)
MICROSCOPIC EXAMINATION: YES
MONOCYTES ABSOLUTE: 0.6 K/UL (ref 0–1.3)
MONOCYTES RELATIVE PERCENT: 6.2 %
NEUTROPHILS ABSOLUTE: 8.5 K/UL (ref 1.7–7.7)
NEUTROPHILS RELATIVE PERCENT: 85.7 %
NITRITE, URINE: NEGATIVE
PDW BLD-RTO: 15.3 % (ref 12.4–15.4)
PH UA: 5 (ref 5–8)
PLATELET # BLD: 194 K/UL (ref 135–450)
PMV BLD AUTO: 8.4 FL (ref 5–10.5)
POTASSIUM SERPL-SCNC: 4 MMOL/L (ref 3.5–5.1)
PROTEIN UA: ABNORMAL MG/DL
PROTHROMBIN TIME: 20.5 SEC (ref 11.7–14.5)
RBC # BLD: 4.18 M/UL (ref 4.2–5.9)
RBC UA: 186 /HPF (ref 0–4)
SODIUM BLD-SCNC: 138 MMOL/L (ref 136–145)
SPECIFIC GRAVITY UA: 1.01 (ref 1–1.03)
TRIGL SERPL-MCNC: 143 MG/DL (ref 0–150)
TROPONIN: 0.35 NG/ML
TROPONIN: 0.39 NG/ML
URINE REFLEX TO CULTURE: ABNORMAL
URINE TYPE: ABNORMAL
UROBILINOGEN, URINE: 0.2 E.U./DL
VLDLC SERPL CALC-MCNC: 29 MG/DL
WBC # BLD: 9.9 K/UL (ref 4–11)
WBC UA: 4 /HPF (ref 0–5)

## 2022-12-18 PROCEDURE — 83735 ASSAY OF MAGNESIUM: CPT

## 2022-12-18 PROCEDURE — 93010 ELECTROCARDIOGRAM REPORT: CPT | Performed by: INTERNAL MEDICINE

## 2022-12-18 PROCEDURE — 2580000003 HC RX 258: Performed by: HOSPITALIST

## 2022-12-18 PROCEDURE — 6370000000 HC RX 637 (ALT 250 FOR IP): Performed by: HOSPITALIST

## 2022-12-18 PROCEDURE — 80048 BASIC METABOLIC PNL TOTAL CA: CPT

## 2022-12-18 PROCEDURE — 1200000000 HC SEMI PRIVATE

## 2022-12-18 PROCEDURE — 6360000002 HC RX W HCPCS: Performed by: HOSPITALIST

## 2022-12-18 PROCEDURE — 36415 COLL VENOUS BLD VENIPUNCTURE: CPT

## 2022-12-18 PROCEDURE — 94640 AIRWAY INHALATION TREATMENT: CPT

## 2022-12-18 PROCEDURE — 85610 PROTHROMBIN TIME: CPT

## 2022-12-18 PROCEDURE — 94760 N-INVAS EAR/PLS OXIMETRY 1: CPT

## 2022-12-18 PROCEDURE — 85025 COMPLETE CBC W/AUTO DIFF WBC: CPT

## 2022-12-18 PROCEDURE — 87040 BLOOD CULTURE FOR BACTERIA: CPT

## 2022-12-18 PROCEDURE — 80061 LIPID PANEL: CPT

## 2022-12-18 PROCEDURE — 84484 ASSAY OF TROPONIN QUANT: CPT

## 2022-12-18 PROCEDURE — 83605 ASSAY OF LACTIC ACID: CPT

## 2022-12-18 PROCEDURE — 81001 URINALYSIS AUTO W/SCOPE: CPT

## 2022-12-18 RX ORDER — IPRATROPIUM BROMIDE AND ALBUTEROL SULFATE 2.5; .5 MG/3ML; MG/3ML
1 SOLUTION RESPIRATORY (INHALATION) EVERY 4 HOURS PRN
Status: DISCONTINUED | OUTPATIENT
Start: 2022-12-18 | End: 2022-12-21 | Stop reason: HOSPADM

## 2022-12-18 RX ORDER — FUROSEMIDE 10 MG/ML
20 INJECTION INTRAMUSCULAR; INTRAVENOUS 2 TIMES DAILY
Status: DISCONTINUED | OUTPATIENT
Start: 2022-12-18 | End: 2022-12-20

## 2022-12-18 RX ADMIN — SODIUM CHLORIDE, PRESERVATIVE FREE 10 ML: 5 INJECTION INTRAVENOUS at 08:31

## 2022-12-18 RX ADMIN — ALLOPURINOL 200 MG: 100 TABLET ORAL at 08:30

## 2022-12-18 RX ADMIN — VALSARTAN 40 MG: 80 TABLET ORAL at 08:30

## 2022-12-18 RX ADMIN — ASPIRIN 81 MG: 81 TABLET, CHEWABLE ORAL at 08:29

## 2022-12-18 RX ADMIN — Medication 2.5 MG: at 08:30

## 2022-12-18 RX ADMIN — IPRATROPIUM BROMIDE AND ALBUTEROL SULFATE 1 AMPULE: .5; 3 SOLUTION RESPIRATORY (INHALATION) at 09:11

## 2022-12-18 RX ADMIN — TAMSULOSIN HYDROCHLORIDE 0.4 MG: 0.4 CAPSULE ORAL at 20:17

## 2022-12-18 RX ADMIN — Medication 2000 UNITS: at 08:29

## 2022-12-18 RX ADMIN — SODIUM CHLORIDE, PRESERVATIVE FREE 10 ML: 5 INJECTION INTRAVENOUS at 20:17

## 2022-12-18 RX ADMIN — ISOSORBIDE MONONITRATE 30 MG: 30 TABLET, EXTENDED RELEASE ORAL at 08:30

## 2022-12-18 RX ADMIN — METOPROLOL SUCCINATE 12.5 MG: 25 TABLET, EXTENDED RELEASE ORAL at 08:29

## 2022-12-18 RX ADMIN — FAMOTIDINE 20 MG: 20 TABLET, FILM COATED ORAL at 20:17

## 2022-12-18 RX ADMIN — Medication 2.5 MG: at 20:17

## 2022-12-18 RX ADMIN — IPRATROPIUM BROMIDE AND ALBUTEROL SULFATE 1 AMPULE: .5; 3 SOLUTION RESPIRATORY (INHALATION) at 12:19

## 2022-12-18 RX ADMIN — FUROSEMIDE 40 MG: 10 INJECTION, SOLUTION INTRAMUSCULAR; INTRAVENOUS at 08:29

## 2022-12-18 NOTE — H&P
Hospitalist  History and Physical    Patient:  Farzana Palacio  MRN: [id number]  PCP: Sundra Lesch    CHIEF COMPLAINT:  sob, abd pain      HISTORY OF PRESENT ILLNESS:   The patient Farzana Palacio is a 80 y.o.male with medical history significant for CHF TIA DVT pulmonary embolism and chronic anticoagulation with Eliquis. Patient also has hypertension and CKD. Patient presented to the emergency room with 2 weeks of for worsening shortness of breath. Patient also reports dry cough. Patient denies chest pain no history of nausea vomiting or diarrhea no history of hematemesis melena no history of fever chills. Patient does report some lower abdominal discomfort and frequency and urgency of urination    Past Medical History:        Diagnosis Date    Cancer (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 [place] [address]     TIA (transient ischemic attack) 2010       Past Surgical History:    No past surgical history on file. Medications Prior to Admission:    Prior to Admission medications    Medication Sig Start Date End Date Taking? Authorizing Provider   furosemide (LASIX) 40 MG tablet Take 1 tablet by mouth daily  Patient taking differently: Take 20 mg by mouth daily 5/1/21   Jillian Ruiz MD   apixaban (ELIQUIS) 2.5 MG TABS tablet Take 2.5 mg by mouth 2 times daily    Historical Provider, MD   metoprolol succinate (TOPROL XL) 25 MG extended release tablet Take 12.5 mg by mouth daily    Historical Provider, MD   tamsulosin (FLOMAX) 0.4 MG capsule Take 0.4 mg by mouth nightly    Historical Provider, MD   valsartan (DIOVAN) 40 MG tablet Take 40 mg by mouth daily    Historical Provider, MD   aspirin 81 MG chewable tablet Take 81 mg by mouth daily    Historical Provider, MD   allopurinol (ZYLOPRIM) 100 MG tablet Take 200 mg by mouth daily    Historical Provider, MD       Allergies:  Patient has no known allergies. Social History:   TOBACCO:   reports that he has been smoking pipe.  He does not have any smokeless tobacco history on file. ETOH:   reports no history of alcohol use. Family History:   Patient denies history of CHF in the family        REVIEW OF SYSTEMS:     patients reported symptoms are in BOLD all other symptoms are negative. CONSTITUTIONAL:      fatigue, fever, chills or night sweats, recent weight gain, recent wt loss, insomnia,  General weakness, poor appetite, muscle aches and pains    HEAD: headache, dizziness    EYES:      blurriness,  double vision, dryness,  discharge, irritation,diplopia    EARS:      hearing loss, vertigo, ear discharge,  Earache. Ringing in the ears. NOSE:      Rhinorrhea, sneezing, epistaxis. Discharge, sinusitis,     MOUTH/THROAT:         sore throat, mouth ulcers, Hoarseness    RESPIRATORY:        Shortness of breath, wheezing,  cough, sputum, hemoptysis, obstructive sleep apnea,    CARDIOVASCULAR :      chest pain, palpitations, dyspnea on exercise, Lower extrimity edema (swelling),     GASTROINTESTINAL:       Dysphagia, Poor appetite,  Nausea, Vomiting, diarrhea, heartburn, abdominal pain. Blood in the stools, hematemesis. Pain with swallowing, constipation    GENITOURINARY:       Urinary frequency, hesitancy,  urgency, Dysuria, hematuria,  Urinary Incontinence. Urinary Retention. GYNECOLOGICAL: vaginal bleeding , vaginal discharge, menopause    MUSCULOSKELETAL:       joint swelling or stiffness, joint pain, muscle pain, balance problems, low back pain. NEUROLOGICAL:      Gait problems. Tremor. Dizziness. Pain and paresthesias, weakness in extremities. Seizures, memory loss    PSYCHLOGICAL:        Anxiety, depression    SKIN :      Rashes ulcers, skin color changes, easy bruisability, lymphadenopathy      Physical Exam:      Vitals: BP (!) 91/57   Pulse (!) 101   Temp 97.4 °F (36.3 °C) (Oral)   Resp 20   Ht 5' 10\" (1.778 m)   Wt 152 lb 12.5 oz (69.3 kg)   SpO2 90%   BMI 21.92 kg/m²     Gen:          Alert and oriented x 3  Eyes: PERRL.  No sclera icterus. No conjunctival injection. ENT: No discharge. Pharynx clear. External appearance of ears and nose normal.  Neck: Trachea midline. No obvious mass. Resp: No accessory muscle use. No crackles. No wheezes. No rhonchi. CV: Regular rate. Regular rhythm. No murmur or rub. No edema. GI: Non-tender. Non-distended. No hernia. Skin: Warm, dry, normal texture and turgor. Lymph: No cervical LAD. No supraclavicular LAD. M/S: / Ext. No cyanosis. No clubbing. No joint deformity. Neuro: Moves all four extremities. CN 2-12 tested, no deficits noted. Peripheral pulses and capillary refill is intact. CBC:   Recent Labs     12/17/22  1303   WBC 12.3*   HGB 13.4*        BMP:    Recent Labs     12/17/22  1303      K 4.4      CO2 21   BUN 53*   CREATININE 2.1*   GLUCOSE 128*     Hepatic:   Recent Labs     12/17/22  1303   AST 26   ALT 24   BILITOT 0.7   ALKPHOS 76     Troponin:   Recent Labs     12/17/22  1303   TROPONINI 0.30*     BNP: No results for input(s): BNP in the last 72 hours. INR: No results for input(s): INR in the last 72 hours. No results found for: LABA1C        No results for input(s): CKTOTAL in the last 72 hours. -----------------------------------------------------------------    XR CHEST  Mild pulmonary edema with moderate right pleural effusion. Assessment / Plan     Pulmonary edema and right-sided pleural effusion  Start patient on Lasix 40 mg IV twice daily  Patient received 80 mg IV Lasix in the emergency room    Anticoagulation  Continue on Eliquis    Hypertension  Continue on home medication  Imdur metoprolol and valsartan on    Continue other home medication  That includes allopurinol aspirin     Urinary symptoms  Patient reports urgency and frequency  Continue on Flomax  Check pre and postvoid residuals          DVT and GI prophylaxis      Full Code      Alfonso Lassiter MD M.D    This note was transcribed using [id number] [facility].  Please disregard any translational errors.

## 2022-12-18 NOTE — PLAN OF CARE
Problem: Discharge Planning  Goal: Discharge to home or other facility with appropriate resources  Outcome: Progressing  Flowsheets (Taken 12/17/2022 2488 by Kolton Eason RN)  Discharge to home or other facility with appropriate resources: Identify barriers to discharge with patient and caregiver     Problem: Skin/Tissue Integrity  Goal: Absence of new skin breakdown  Description: 1. Monitor for areas of redness and/or skin breakdown  2. Assess vascular access sites hourly  3. Every 4-6 hours minimum:  Change oxygen saturation probe site  4. Every 4-6 hours:  If on nasal continuous positive airway pressure, respiratory therapy assess nares and determine need for appliance change or resting period.   Outcome: Progressing

## 2022-12-18 NOTE — PROGRESS NOTES
Hospitalist Progress Note      PCP: Chente Bowen    Chief Complaint. Presented to hospital for SOB    Date of Admission: 12/17/2022      Subjective:   denies chest pain, nausea, vomiting, shortness of breath, fever or chills. mention feels overall better    Medications:  Reviewed    Infusion Medications    sodium chloride       Scheduled Medications    polyethylene glycol  17 g Oral Once    allopurinol  200 mg Oral Daily    aspirin  81 mg Oral Daily    Vitamin D  2,000 Units Oral Daily    isosorbide mononitrate  30 mg Oral Daily    metoprolol succinate  12.5 mg Oral Daily    tamsulosin  0.4 mg Oral Nightly    valsartan  40 mg Oral Daily    sodium chloride flush  5-40 mL IntraVENous 2 times per day    famotidine  20 mg Oral Nightly    furosemide  40 mg IntraVENous BID    ipratropium-albuterol  1 ampule Inhalation Q4H WA    apixaban  2.5 mg Oral BID     PRN Meds: sodium chloride flush, sodium chloride, ondansetron **OR** ondansetron, polyethylene glycol, acetaminophen **OR** acetaminophen      Intake/Output Summary (Last 24 hours) at 12/18/2022 1225  Last data filed at 12/18/2022 6830  Gross per 24 hour   Intake 600 ml   Output 2250 ml   Net -1650 ml       Physical Exam Performed:    BP (!) 86/54 Comment: RN notified  Pulse 83   Temp 97.9 °F (36.6 °C) (Oral)   Resp 16   Ht 5' 10\" (1.778 m)   Wt 152 lb 12.5 oz (69.3 kg)   SpO2 93%   BMI 21.92 kg/m²     General appearance: No apparent distress,   HEENT:  Conjunctivae/corneas clear. Neck: Supple, with full range of motion. Respiratory:  Normal respiratory effort. Clear to auscultation, bilaterally without Rales/Wheezes/Rhonchi. Cardiovascular: Regular rate and rhythm with normal S1/S2 without murmurs or rubs  Abdomen: Soft, non-tender, non-distended, normal bowel sounds. Musculoskeletal: No cyanosis or edema bilaterally  Neurologic:  without any focal sensory/motor deficits.  grossly non-focal.  Psychiatric: Alert and oriented, Normal mood  Peripheral Pulses: +2 palpable, equal bilaterally       Labs:   Recent Labs     12/17/22  1303 12/18/22  1106   WBC 12.3* 9.9   HGB 13.4* 13.4*   HCT 41.4 41.3    194     Recent Labs     12/17/22  1303 12/18/22  1106    138   K 4.4 4.0    103   CO2 21 19*   BUN 53* 58*   CREATININE 2.1* 2.3*   CALCIUM 11.1* 10.5     Recent Labs     12/17/22  1303   AST 26   ALT 24   BILITOT 0.7   ALKPHOS 76     No results for input(s): INR in the last 72 hours. Recent Labs     12/17/22  1303 12/17/22  2235 12/18/22  1106   TROPONINI 0.30* 0.29* 0.35*       Urinalysis:      Lab Results   Component Value Date/Time    NITRU Negative 12/18/2022 02:00 AM    WBCUA 4 12/18/2022 02:00 AM    BACTERIA Rare 12/18/2022 02:00 AM    RBCUA 186 12/18/2022 02:00 AM    BLOODU LARGE 12/18/2022 02:00 AM    SPECGRAV 1.008 12/18/2022 02:00 AM    GLUCOSEU Negative 12/18/2022 02:00 AM    GLUCOSEU NEGATIVE 09/03/2011 07:10 PM       Radiology:  XR CHEST (2 VW)   Final Result   Mild pulmonary edema with moderate right pleural effusion. Assessment/Plan:    Active Hospital Problems    Diagnosis     SOB (shortness of breath) [R06.02]      Priority: Medium     Pulmonary edema and right-sided pleural effusion  Acute on chronic CHF  Start patient on Lasix 20 mg IV twice daily  Patient received 80 mg IV Lasix in the emergency room  He f[facility] with [facility] cardiologist [facility]    Anticoagulation  Continue on Eliquis     Hypertension  Continue on home medication  Imdur metoprolol and valsartan on     Continue other home medication  That includes allopurinol aspirin      Urinary symptoms  Patient reports urgency and frequency  Continue on Flomax  Check pre and postvoid residuals    DVT Prophylaxis:  Diet: ADULT DIET; Regular;  Low Sodium (2 gm)  Code Status: Full Code    PT/OT Eval Status: ordered    Dispo/Plan of care - discussed with Children and family at the bedside, has hematuria - consult urology, Cr worsening consult nephrology  Monitor BMP  Continue IV Lasix  Repeat CXR tomorrow    Clif Welsh MD

## 2022-12-18 NOTE — CONSULTS
Reason for Consult: Gross hematuria, urinary retention    History of Present Illness: Bonnie Iglesias is a 80 y.o. w/ PMH of CHF, CKD, HTN, h/o DVT/PE on eliquis admitted for SOB. Chiang placed without issue for urinary retetion of >800cc overnight. Patient with brown tinged today, urology consulted for hematuria. Cr 2.3 (baseline 1.8). 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 Social History:  Social History     Socioeconomic History    Marital status:      Spouse name: Not on file    Number of children: Not on file    Years of education: Not on file    Highest education level: Not on file   Occupational History    Not on file   Tobacco Use    Smoking status: Every Day     Types: Pipe    Smokeless tobacco: Not on file   Substance and Sexual Activity    Alcohol use: No    Drug use: No    Sexual activity: Not Currently   Other Topics Concern    Not on file   Social History Narrative    Not on file     Social Determinants of Health     Financial Resource Strain: Not on file   Food Insecurity: Not on file   Transportation Needs: Not on file   Physical Activity: Not on file   Stress: Not on file   Social Connections: Not on file   Intimate Partner Violence: Not on file   Housing Stability: Not on file       Family History:  No family history on file.     Meds:   Current Facility-Administered Medications: furosemide (LASIX) injection 20 mg, 20 mg, IntraVENous, BID  polyethylene glycol (GLYCOLAX) packet 17 g, 17 g, Oral, Once  allopurinol (ZYLOPRIM) tablet 200 mg, 200 mg, Oral, Daily  aspirin chewable tablet 81 mg, 81 mg, Oral, Daily  Vitamin D (CHOLECALCIFEROL) tablet 2,000 Units, 2,000 Units, Oral, Daily  isosorbide mononitrate (IMDUR) extended release tablet 30 mg, 30 mg, Oral, Daily  metoprolol succinate (TOPROL XL) extended release tablet 12.5 mg, 12.5 mg, Oral, Daily  tamsulosin (FLOMAX) capsule 0.4 mg, 0.4 mg, Oral, Nightly  valsartan (DIOVAN) tablet 40 mg, 40 mg, Oral, Daily  sodium chloride flush 0.9 % injection 5-40 mL, 5-40 mL, IntraVENous, 2 times per day  sodium chloride flush 0.9 % injection 5-40 mL, 5-40 mL, IntraVENous, PRN  0.9 % sodium chloride infusion, , IntraVENous, PRN  famotidine (PEPCID) tablet 20 mg, 20 mg, Oral, Nightly  ondansetron (ZOFRAN-ODT) disintegrating tablet 4 mg, 4 mg, Oral, Q8H PRN **OR** ondansetron (ZOFRAN) injection 4 mg, 4 mg, IntraVENous, Q6H PRN  polyethylene glycol (GLYCOLAX) packet 17 g, 17 g, Oral, Daily PRN  acetaminophen (TYLENOL) tablet 650 mg, 650 mg, Oral, Q6H PRN **OR** acetaminophen (TYLENOL) suppository 650 mg, 650 mg, Rectal, Q6H PRN  ipratropium-albuterol (DUONEB) nebulizer solution 1 ampule, 1 ampule, Inhalation, Q4H WA  apixaban (ELIQUIS) tablet 2.5 mg, 2.5 mg, Oral, BID    Review of Systems:  10 Systems were reviewed and negative except as in HPI    Vitals:  BP (!) 86/54 Comment: RN notified  Pulse 83   Temp 97.9 °F (36.6 °C) (Oral)   Resp 16   Ht 5' 10\" (1.778 m)   Wt 152 lb 12.5 oz (69.3 kg)   SpO2 93%   BMI 21.92 kg/m²     Intake/Output Summary (Last 24 hours) at 12/18/2022 1238  Last data filed at 12/18/2022 9266  Gross per 24 hour   Intake 600 ml   Output 2250 ml   Net -1650 ml       Physical Exam:  General Appearance: Alert and oriented, cooperative, no distress, appears stated age  Head: Normocephalic, without obvious abnormality, atraumatic  Back: no CVA tenderness  Lungs: respirations unlabored, no wheezing  Heart: Regular rate and rhythm, no lower extremity edema noted  Abdomen: Soft, non-tender, non-distended, no masses  Skin: Skin color, texture, turgor normal, no rashes or lesions  Neurologic: no gross deficits   Male :  Chiang draining brown tinged urine     Labs:  CBC   Lab Results   Component Value Date/Time    WBC 9.9 12/18/2022 11:06 AM RBC 4.18 12/18/2022 11:06 AM    HGB 13.4 12/18/2022 11:06 AM    HCT 41.3 12/18/2022 11:06 AM    MCV 98.7 12/18/2022 11:06 AM    MCH 32.1 12/18/2022 11:06 AM    MCHC 32.5 12/18/2022 11:06 AM    RDW 15.3 12/18/2022 11:06 AM     12/18/2022 11:06 AM    MPV 8.4 12/18/2022 11:06 AM     BMP   Lab Results   Component Value Date/Time     12/18/2022 11:06 AM    K 4.0 12/18/2022 11:06 AM    K 4.9 04/28/2021 07:32 PM     12/18/2022 11:06 AM    CO2 19 12/18/2022 11:06 AM    BUN 58 12/18/2022 11:06 AM    CREATININE 2.3 12/18/2022 11:06 AM    GLUCOSE 161 12/18/2022 11:06 AM    CALCIUM 10.5 12/18/2022 11:06 AM       Urinalysis:   Lab Results   Component Value Date/Time    COLORU Yellow 12/18/2022 02:00 AM    GLUCOSEU Negative 12/18/2022 02:00 AM    GLUCOSEU NEGATIVE 09/03/2011 07:10 PM    BLOODU LARGE 12/18/2022 02:00 AM    NITRU Negative 12/18/2022 02:00 AM    LEUKOCYTESUR SMALL 12/18/2022 02:00 AM       Imaging:  NA     Impression/Plan: Gross hematuria on anticoagulation likely from bladder distension secondary to urinary retention.   - CTN ortiz catheter for now, no indication for acute intervention  - CTN flomax   - Consider voiding trial prior to discharge    Matt Rainey MD 12/18/202212:38 PM

## 2022-12-18 NOTE — CONSULTS
Nephrology ([facility]) Consult Note    Jaylon Velazco is a 80 y.o. male whom we were asked to see for CKD. He presents with dyspnea. CXR showed mild pulmonary edema with right pleural effusion. He mentioned something about a thoracentesis, but I see no mention of that in the chart. He does not follow a fluid restriction. He has been taking additional diuretics. He was unable to void overnight with PVR > 800 cc, so ortiz was placed. Past Medical History:  CSDF   - 07/21 [facility] echo showed LVEF 20-25%  h/o DVT and PE  h/o TIA  HTN    Review of System:  Otherwise unremarkable    Allergies:  Patient has no known allergies. Medications:  Current medications reviewed. Social History:  tobacco abuse  Family Medical History:  Negative for kidney disase    Physical Exam:  Blood pressure (!) 86/54, pulse 83, temperature 97.9 °F (36.6 °C), temperature source Oral, resp. rate 16, height 5' 10\" (1.778 m), weight 152 lb 12.5 oz (69.3 kg), SpO2 93 %.     General:  NAD, A+Ox3, ill-appearing, normal body habitus  HEENT:  PERRL, EOMI  Neck:  Supple, normal range of movement, thyroid unremarkable  Chest:  CTAB, good respiratory effort, good air movement  CV:  RRR, no murmurs or rubs, no carotid bruit, no abdominal bruits  Abdomen:  NTND, soft, +BS, no hepatosplenomegaly  Extremities:  No peripheral edema  Neurological:  Moving all four extremities, CN II-XII grossly intact  Lymphatics:  No palpable lymph nodes  Skin:  No rash, no jaundice  Psychiatric:  moderate insight and judgement, moderate recall    Laboratory Studies:  Lab Results   Component Value Date/Time     12/18/2022 11:06 AM    K 4.0 12/18/2022 11:06 AM    K 4.9 04/28/2021 07:32 PM     12/18/2022 11:06 AM    CO2 19 (L) 12/18/2022 11:06 AM    BUN 58 (H) 12/18/2022 11:06 AM    CREATININE 2.3 (H) 12/18/2022 11:06 AM    CALCIUM 10.5 12/18/2022 11:06 AM    MG 1.90 12/18/2022 11:06 AM     Lab Results   Component Value Date/Time    WBC 9.9 12/18/2022 11:06 AM    HGB 13.4 (L) 12/18/2022 11:06 AM    HCT 41.3 12/18/2022 11:06 AM     12/18/2022 11:06 AM       Assessment/Plan:  Reviewed old records and labs.      1) CKD3b   - baseline 04/29/21 Cr 1.9                    08/05/22 Cr 1.85   - off valsartan    2) ACSF   - discussed concept of fluid balance   - fluid restriction 1 liter/day   - I don't see excess volume on him, so would suggest continuing output diuretic regimen    3) urinary retention   - ortiz placed   - urology consulted    4) right pleural effusion   - would suggest diagnostic and therapeutic thoracentesis, because given his tobacco abuse, I would rule out malignancy   - he mentions getting one done, but I don't see any documentation of it    5) tobacco abuse   - encouraged to quit    6) HTN > hypotension   - would like SBP > 100 to maintain renal perfusion pressure

## 2022-12-18 NOTE — PLAN OF CARE
Problem: Discharge Planning  Goal: Discharge to home or other facility with appropriate resources  12/18/2022 1046 by Ronald Summers RN  Outcome: Progressing  12/18/2022 0143 by Ana Ahuja RN  Outcome: Progressing  Flowsheets (Taken 12/17/2022 1838 by Nino Bamberger, RN)  Discharge to home or other facility with appropriate resources: Identify barriers to discharge with patient and caregiver     Problem: Skin/Tissue Integrity  Goal: Absence of new skin breakdown  Description: 1. Monitor for areas of redness and/or skin breakdown  2. Assess vascular access sites hourly  3. Every 4-6 hours minimum:  Change oxygen saturation probe site  4. Every 4-6 hours:  If on nasal continuous positive airway pressure, respiratory therapy assess nares and determine need for appliance change or resting period.   12/18/2022 1046 by Ronald Summers RN  Outcome: Progressing  12/18/2022 0143 by Ana Ahuja RN  Outcome: Progressing

## 2022-12-18 NOTE — PROGRESS NOTES
4 Eyes Skin Assessment     NAME:  Tia Sellers  YOB: 1930  MEDICAL RECORD NUMBER:  [id number]    The patient is being assessed for  Admission    I agree that One RN have performed a thorough Head to Toe Skin Assessment on the patient. ALL assessment sites listed below have been assessed. Areas assessed by both nurses:    Head, Face, Ears, Shoulders, Back, Chest, Arms, Elbows, Hands, Sacrum. Buttock, Coccyx, Ischium, and Legs. Feet and Heels        Does the Patient have a Wound? Other scattered abrasions/scabs , missing ear lobe from previous surgery on his ear.         Jose Prevention initiated by RN: Yes   Wound Care Orders initiated by RN: Yes    Pressure Injury (Stage 3,4, Unstageable, DTI, NWPT, and Complex wounds) if present place referral order by RN under : No    New and Established Ostomies, if present place, referral order under : No      Nurse 1 eSignature: Electronically signed by Keyona May RN on 12/17/22 at 7:54 PM EST    **SHARE this note so that the co-signing nurse is able to place an eSignature**     Nurse 2 eSignature: Electronically signed by Karlo Ramirez RN on 12/18/22 at 1:43 AM EST

## 2022-12-18 NOTE — PLAN OF CARE
Problem: Discharge Planning  Goal: Discharge to home or other facility with appropriate resources  12/18/2022 1054 by Ankur Alexander RN  Outcome: Progressing  12/18/2022 1046 by Ankur Alexander RN  Outcome: Progressing  12/18/2022 0143 by Susan Nicholas RN  Outcome: Progressing  Flowsheets (Taken 12/17/2022 1838 by Mary Graves RN)  Discharge to home or other facility with appropriate resources: Identify barriers to discharge with patient and caregiver     Problem: Skin/Tissue Integrity  Goal: Absence of new skin breakdown  Description: 1. Monitor for areas of redness and/or skin breakdown  2. Assess vascular access sites hourly  3. Every 4-6 hours minimum:  Change oxygen saturation probe site  4. Every 4-6 hours:  If on nasal continuous positive airway pressure, respiratory therapy assess nares and determine need for appliance change or resting period.   12/18/2022 1054 by Ankur Alexander RN  Outcome: Progressing  12/18/2022 1046 by Ankur Alexander RN  Outcome: Progressing  12/18/2022 0143 by Susan Nicholas RN  Outcome: Progressing

## 2022-12-18 NOTE — RT PROTOCOL NOTE
RT Inhaler-Nebulizer Bronchodilator Protocol Note    There is a bronchodilator order in the chart from a provider indicating to follow the RT Bronchodilator Protocol and there is an Initiate RT Inhaler-Nebulizer Bronchodilator Protocol order as well (see protocol at bottom of note). CXR Findings:  No results found. The findings from the last RT Protocol Assessment were as follows:   History Pulmonary Disease: Smoker 15 pack years or more  Respiratory Pattern: Regular pattern and RR 12-20 bpm  Breath Sounds: Slightly diminished and/or crackles  Cough: Strong, spontaneous, non-productive  Indication for Bronchodilator Therapy: None  Bronchodilator Assessment Score: 3    Aerosolized bronchodilator medication orders have been revised according to the RT Inhaler-Nebulizer Bronchodilator Protocol below. Respiratory Therapist to perform RT Therapy Protocol Assessment initially then follow the protocol. Repeat RT Therapy Protocol Assessment PRN for score 0-3 or on second treatment, BID, and PRN for scores above 3. No Indications - adjust the frequency to every 6 hours PRN wheezing or bronchospasm, if no treatments needed after 48 hours then discontinue using Per Protocol order mode. If indication present, adjust the RT bronchodilator orders based on the Bronchodilator Assessment Score as indicated below. Use Inhaler orders unless patient has one or more of the following: on home nebulizer, not able to hold breath for 10 seconds, is not alert and oriented, cannot activate and use MDI correctly, or respiratory rate 25 breaths per minute or more, then use the equivalent nebulizer order(s) with same Frequency and PRN reasons based on the score. If a patient is on this medication at home then do not decrease Frequency below that used at home.     0-3 - enter or revise RT bronchodilator order(s) to equivalent RT Bronchodilator order with Frequency of every 4 hours PRN for wheezing or increased work of breathing using Per Protocol order mode. 4-6 - enter or revise RT Bronchodilator order(s) to two equivalent RT bronchodilator orders with one order with BID Frequency and one order with Frequency of every 4 hours PRN wheezing or increased work of breathing using Per Protocol order mode. 7-10 - enter or revise RT Bronchodilator order(s) to two equivalent RT bronchodilator orders with one order with TID Frequency and one order with Frequency of every 4 hours PRN wheezing or increased work of breathing using Per Protocol order mode. 11-13 - enter or revise RT Bronchodilator order(s) to one equivalent RT bronchodilator order with QID Frequency and an Albuterol order with Frequency of every 4 hours PRN wheezing or increased work of breathing using Per Protocol order mode. Greater than 13 - enter or revise RT Bronchodilator order(s) to one equivalent RT bronchodilator order with every 4 hours Frequency and an Albuterol order with Frequency of every 2 hours PRN wheezing or increased work of breathing using Per Protocol order mode. RT to enter RT Home Evaluation for COPD & MDI Assessment order using Per Protocol order mode.     Electronically signed by Margret Worthington RCP on 12/18/2022 at 3:24 PM

## 2022-12-18 NOTE — PROGRESS NOTES
Patient unable to void, complains of lower abdominal pain and pressure, states he has a hx of prostate problems, bladder scan done on days shows 717ml, 16F ortiz placed using sterile technique, patient tolerated well, 800ml of urine out within first 5 minutes, patient states he feels much better, tubing secured to right thigh, assisted to position of comfort, patient encouraged to call with needs, call light in reach, items within reach, will continue to monitor

## 2022-12-19 ENCOUNTER — APPOINTMENT (OUTPATIENT)
Dept: GENERAL RADIOLOGY | Age: 87
DRG: 291 | End: 2022-12-19
Payer: MEDICARE

## 2022-12-19 ENCOUNTER — APPOINTMENT (OUTPATIENT)
Dept: ULTRASOUND IMAGING | Age: 87
DRG: 291 | End: 2022-12-19
Payer: MEDICARE

## 2022-12-19 PROBLEM — I24.8 DEMAND ISCHEMIA (HCC): Status: ACTIVE | Noted: 2022-12-19

## 2022-12-19 PROBLEM — N18.9 ACUTE KIDNEY INJURY SUPERIMPOSED ON CKD (HCC): Status: ACTIVE | Noted: 2022-12-19

## 2022-12-19 PROBLEM — N17.9 ACUTE KIDNEY INJURY SUPERIMPOSED ON CKD (HCC): Status: ACTIVE | Noted: 2022-12-19

## 2022-12-19 LAB
ALBUMIN FLUID: 1.3 G/DL
ANION GAP SERPL CALCULATED.3IONS-SCNC: 13 MMOL/L (ref 3–16)
APPEARANCE FLUID: NORMAL
BUN BLDV-MCNC: 57 MG/DL (ref 7–20)
CALCIUM SERPL-MCNC: 10.6 MG/DL (ref 8.3–10.6)
CELL COUNT FLUID TYPE: NORMAL
CHLORIDE BLD-SCNC: 104 MMOL/L (ref 99–110)
CLOT EVALUATION: NORMAL
CO2: 23 MMOL/L (ref 21–32)
COLOR FLUID: YELLOW
CREAT SERPL-MCNC: 2.3 MG/DL (ref 0.8–1.3)
FLUID TYPE: NORMAL
GFR SERPL CREATININE-BSD FRML MDRD: 26 ML/MIN/{1.73_M2}
GLUCOSE BLD-MCNC: 109 MG/DL (ref 70–99)
LD, FLUID: 60 U/L
LYMPHOCYTES, BODY FLUID: 69 %
MACROPHAGE FLUID: 4 %
MAGNESIUM: 2 MG/DL (ref 1.8–2.4)
MONOCYTE, FLUID: 8 %
NEUTROPHIL, FLUID: 16 %
NUCLEATED CELLS FLUID: 455 /CUMM
NUMBER OF CELLS COUNTED FLUID: 100
POTASSIUM SERPL-SCNC: 4.2 MMOL/L (ref 3.5–5.1)
PROTEIN FLUID: 1.7 G/DL
RBC FLUID: <2000 /CUMM
SODIUM BLD-SCNC: 140 MMOL/L (ref 136–145)
TROPONIN: 0.42 NG/ML
TROPONIN: 0.43 NG/ML
TROPONIN: 0.43 NG/ML
VARIANT LYMPH FLUID: 3 %
VOLUME: 1450 ML

## 2022-12-19 PROCEDURE — 36415 COLL VENOUS BLD VENIPUNCTURE: CPT

## 2022-12-19 PROCEDURE — 6370000000 HC RX 637 (ALT 250 FOR IP): Performed by: HOSPITALIST

## 2022-12-19 PROCEDURE — 97530 THERAPEUTIC ACTIVITIES: CPT

## 2022-12-19 PROCEDURE — 97166 OT EVAL MOD COMPLEX 45 MIN: CPT

## 2022-12-19 PROCEDURE — 2580000003 HC RX 258: Performed by: HOSPITALIST

## 2022-12-19 PROCEDURE — 89051 BODY FLUID CELL COUNT: CPT

## 2022-12-19 PROCEDURE — 1200000000 HC SEMI PRIVATE

## 2022-12-19 PROCEDURE — C8929 TTE W OR WO FOL WCON,DOPPLER: HCPCS

## 2022-12-19 PROCEDURE — 71045 X-RAY EXAM CHEST 1 VIEW: CPT

## 2022-12-19 PROCEDURE — 80048 BASIC METABOLIC PNL TOTAL CA: CPT

## 2022-12-19 PROCEDURE — 88112 CYTOPATH CELL ENHANCE TECH: CPT

## 2022-12-19 PROCEDURE — 84157 ASSAY OF PROTEIN OTHER: CPT

## 2022-12-19 PROCEDURE — 6360000002 HC RX W HCPCS: Performed by: INTERNAL MEDICINE

## 2022-12-19 PROCEDURE — 82042 OTHER SOURCE ALBUMIN QUAN EA: CPT

## 2022-12-19 PROCEDURE — 84484 ASSAY OF TROPONIN QUANT: CPT

## 2022-12-19 PROCEDURE — 94760 N-INVAS EAR/PLS OXIMETRY 1: CPT

## 2022-12-19 PROCEDURE — C1729 CATH, DRAINAGE: HCPCS

## 2022-12-19 PROCEDURE — 83615 LACTATE (LD) (LDH) ENZYME: CPT

## 2022-12-19 PROCEDURE — 6360000004 HC RX CONTRAST MEDICATION: Performed by: INTERNAL MEDICINE

## 2022-12-19 PROCEDURE — 97162 PT EVAL MOD COMPLEX 30 MIN: CPT

## 2022-12-19 PROCEDURE — 88305 TISSUE EXAM BY PATHOLOGIST: CPT

## 2022-12-19 PROCEDURE — 76770 US EXAM ABDO BACK WALL COMP: CPT

## 2022-12-19 PROCEDURE — 97535 SELF CARE MNGMENT TRAINING: CPT

## 2022-12-19 PROCEDURE — 0W993ZZ DRAINAGE OF RIGHT PLEURAL CAVITY, PERCUTANEOUS APPROACH: ICD-10-PCS | Performed by: INTERNAL MEDICINE

## 2022-12-19 PROCEDURE — 83735 ASSAY OF MAGNESIUM: CPT

## 2022-12-19 PROCEDURE — 99223 1ST HOSP IP/OBS HIGH 75: CPT | Performed by: INTERNAL MEDICINE

## 2022-12-19 RX ORDER — METOPROLOL SUCCINATE 25 MG/1
12.5 TABLET, EXTENDED RELEASE ORAL DAILY
Status: DISCONTINUED | OUTPATIENT
Start: 2022-12-20 | End: 2022-12-21 | Stop reason: HOSPADM

## 2022-12-19 RX ADMIN — ALLOPURINOL 200 MG: 100 TABLET ORAL at 08:02

## 2022-12-19 RX ADMIN — Medication 2.5 MG: at 08:04

## 2022-12-19 RX ADMIN — TAMSULOSIN HYDROCHLORIDE 0.4 MG: 0.4 CAPSULE ORAL at 21:26

## 2022-12-19 RX ADMIN — FUROSEMIDE 20 MG: 10 INJECTION, SOLUTION INTRAMUSCULAR; INTRAVENOUS at 08:02

## 2022-12-19 RX ADMIN — FUROSEMIDE 20 MG: 10 INJECTION, SOLUTION INTRAMUSCULAR; INTRAVENOUS at 18:15

## 2022-12-19 RX ADMIN — FAMOTIDINE 20 MG: 20 TABLET, FILM COATED ORAL at 21:26

## 2022-12-19 RX ADMIN — SODIUM CHLORIDE, PRESERVATIVE FREE 10 ML: 5 INJECTION INTRAVENOUS at 07:30

## 2022-12-19 RX ADMIN — PERFLUTREN 1.5 ML: 6.52 INJECTION, SUSPENSION INTRAVENOUS at 14:21

## 2022-12-19 RX ADMIN — SODIUM CHLORIDE, PRESERVATIVE FREE 10 ML: 5 INJECTION INTRAVENOUS at 21:26

## 2022-12-19 RX ADMIN — ISOSORBIDE MONONITRATE 30 MG: 30 TABLET, EXTENDED RELEASE ORAL at 08:02

## 2022-12-19 RX ADMIN — Medication 2000 UNITS: at 08:02

## 2022-12-19 RX ADMIN — ASPIRIN 81 MG: 81 TABLET, CHEWABLE ORAL at 08:02

## 2022-12-19 RX ADMIN — Medication 2.5 MG: at 21:26

## 2022-12-19 RX ADMIN — METOPROLOL SUCCINATE 12.5 MG: 25 TABLET, EXTENDED RELEASE ORAL at 08:02

## 2022-12-19 ASSESSMENT — PAIN SCALES - GENERAL
PAINLEVEL_OUTOF10: 0
PAINLEVEL_OUTOF10: 0

## 2022-12-19 NOTE — CARE COORDINATION
Case Management Assessment  Initial Evaluation    Date/Time of Evaluation: 12/19/2022 3:03 PM  Assessment Completed by: Basia Leigh RN    If patient is discharged prior to next notation, then this note serves as note for discharge by case management. Patient Name: Ramón Sweeney                   YOB: 1930  Diagnosis: SOB (shortness of breath) [R06.02]  Elevated troponin [R77.8]  HOLLI (acute kidney injury) ([facility] [address] [N17.9]  Goals of care, counseling/discussion [Z71.89]  Acute on chronic combined systolic and diastolic congestive heart failure ([facility] [address] [I50.43]  Pipe smoker [F17.290]  Constipation, unspecified constipation type [K59.00]                   Date / Time: 12/17/2022 12:16 PM    Patient Admission Status: Inpatient   Readmission Risk (Low < 19, Mod (19-27), High > 27): Readmission Risk Score: 15.7    Current PCP: Charli Orellana  PCP verified by CM? Yes    Chart Reviewed: Yes      History Provided by: Patient  Patient Orientation: Alert and Oriented, Person, Place, Situation, Self    Patient Cognition: Alert    Hospitalization in the last 30 days (Readmission):  No    If yes, Readmission Assessment in CM Navigator will be completed. Advance Directives:      Code Status: Full Code   Patient's Primary Decision Maker is: Legal Next of Kin    Primary Decision MakerArjessica Bearden - [phone]    Secondary Decision Maker: Yamel Nobles Child - [phone]    Discharge Planning:    Patient lives with: Alone Type of Home: House  Primary Care Giver: Self  Patient Support Systems include: Children   Current Financial resources: Medicare  Current community resources:    Current services prior to admission: Durable Medical Equipment, Home Care ([facility] through his insurnace)            Current DME:  (has grab bars in his walk-in shower)            Type of Home Care services:  PT, OT    ADLS  Prior functional level:  Independent in ADLs/IADLs  Current functional level: Assistance with the following:, Mobility    PT AM-PAC: 20 /24  OT AM-PAC: 19 /24    Family can provide assistance at DC: Yes  Would you like Case Management to discuss the discharge plan with any other family members/significant others, and if so, who?  Yes (son Junior Galo)  Plans to Return to Present Housing: Yes  Other Identified Issues/Barriers to RETURNING to current housing: na  Potential Assistance needed at discharge:              Potential DME:    Patient expects to discharge to: [address] [place] [address] for transportation at discharge: Family    Financial    Payor: Gillian Chawla / Plan: Blanche Olivo ESSENTIAL/PLUS / Product Type: *No Product type* /     Does insurance require precert for SNF: Yes    Potential assistance Purchasing Medications: No  Meds-to-Beds request:        [address] [id number] Ankit Crawford, [address] [facility] [phone] Maykel Hernandez [phone]  [address]  [address] [address]  Phone: [phone] Fax: [phone]      Notes:    Factors facilitating achievement of predicted outcomes: Family support, Motivated, Cooperative, and Pleasant    Barriers to discharge: na    Additional Case Management Notes: denies needs, [facility], one of his sons can take him home at [address] [facility] for Transition of Care is related to the following treatment goals of SOB (shortness of breath) [R06.02]  Elevated troponin [R77.8]  HOLLI (acute kidney injury) ([facility] [address] [N17.9]  Goals of care, counseling/discussion [Z71.89]  Acute on chronic combined systolic and diastolic congestive heart failure ([facility] [address] [I50.43]  Pipe smoker [F17.290]  Constipation, unspecified constipation type [K59.00]    Ernesto Blount RN, BSN,   [phone]  Electronically signed by Ernesto Blount RN on 12/19/2022 at 3:07 PM

## 2022-12-19 NOTE — PLAN OF CARE
Problem: Discharge Planning  Goal: Discharge to home or other facility with appropriate resources  12/18/2022 2345 by Taylor Rivas RN  Outcome: Progressing     Problem: Skin/Tissue Integrity  Goal: Absence of new skin breakdown  Description: 1. Monitor for areas of redness and/or skin breakdown  2. Assess vascular access sites hourly  3. Every 4-6 hours minimum:  Change oxygen saturation probe site  4. Every 4-6 hours:  If on nasal continuous positive airway pressure, respiratory therapy assess nares and determine need for appliance change or resting period.   12/18/2022 2345 by Taylor Rivas RN  Outcome: Progressing

## 2022-12-19 NOTE — PROGRESS NOTES
Occupational Therapy  Facility/Department: [facility]  Occupational Therapy Initial Assessment    Name: Joanna Benitez  : 1930  MRN: [id number]  Date of Service: 2022    Discharge Recommendations:  24 hour supervision or assist, Home with Home health OT, S Level 1, Continue to assess pending progress  Joanna Benitez scored a 19/24 on the AM-PAC ADL Inpatient form. Current research shows that an AM-PAC score of 18 or greater is typically associated with a discharge to the patient's home setting. Based on the patient's AM-PAC score, and their current ADL deficits, it is recommended that the patient have 2-3 sessions per week of Occupational Therapy at d/c to increase the patient's independence. At this time, this patient demonstrates the endurance and safety to discharge home with [facility] OT L1 and a follow up treatment frequency of 2-3x/wk. Please see assessment section for further patient specific details. If patient discharges prior to next session this note will serve as a discharge summary. Please see below for the latest assessment towards goals. OT Equipment Recommendations  Equipment Needed: Yes  Mobility Devices: ADL Assistive Devices  ADL Assistive Devices: Shower Chair with back       Patient Diagnosis(es): The primary encounter diagnosis was Acute on chronic combined systolic and diastolic congestive heart failure ([address] Diagnoses of Goals of care, counseling/discussion, HOLLI (acute kidney injury) ([address] Pipe smoker, Constipation, unspecified constipation type, Elevated troponin, and Pleural effusion were also pertinent to this visit. Past Medical History:  has a past medical history of Cancer ([address] CHF (congestive heart failure) ([address] and TIA (transient ischemic attack). Past Surgical History:  has no past surgical history on file.     Treatment Diagnosis: Decreased: ADLs, functional transfers/mobility      Assessment   Performance deficits / Impairments: Decreased functional mobility ;Decreased ADL status; Decreased balance;Decreased high-level IADLs  Assessment: Pt is a 79 yo M admitted with SOB, abdominal pain. PTA, pt lives alone in a house where he is typically independent in self-care, functional mobility, and has assist for homemaking. He is below baseline today, requiring CGA-SBA for functional transfers and mobility, much steadier w/ use of RW. He required min A for LB dressing, CGA-SBA standing grooming, total A toileting via ortiz. Will continue to see on acute in order to address the above deficits and maximize pt's functional performance. Anticipate that as long as pt continues to progress w/ therapy, he will be able to return home at d/c. Would recommend 1-2 days of 24/7 supervision to increase pt's safety and reduce fall risk. Treatment Diagnosis: Decreased: ADLs, functional transfers/mobility  Prognosis: Good  Decision Making: Medium Complexity  History: see above  REQUIRES OT FOLLOW-UP: Yes  Activity Tolerance  Activity Tolerance: Patient Tolerated treatment well        Plan   Occupational Therapy Plan  Times Per Week: 3-5  Times Per Day: Once a day  Current Treatment Recommendations: Strengthening, ROM, Balance training, Functional mobility training, Endurance training, Self-Care / ADL, Safety education & training     Restrictions  Restrictions/Precautions  Restrictions/Precautions: Fall Risk    Subjective   General  Chart Reviewed: Yes  Patient assessed for rehabilitation services?: Yes  Additional Pertinent Hx: per H&P: \"The patient Nohemi Cage is a 80 y.o.male with medical history significant for CHF TIA DVT pulmonary embolism and chronic anticoagulation with Eliquis. Patient also has hypertension and CKD. Patient presented to the emergency room with 2 weeks of for worsening shortness of breath. Patient also reports dry cough. \"  Family / Caregiver Present: No  Referring Practitioner: Yahir  Diagnosis: Pulmonary Edema  Subjective  Subjective: Pt met b/s for OT eval/tx w/ PT. Pt in bed, agreeable to therapy. Pt denied pain  General Comment  Comments: Per RN ok to see     Social/Functional History  Social/Functional History  Lives With: Alone  Type of Home: House  Home Layout: One level, Laundry in basement (stair lift to basement)  Home Access: Stairs to enter with rails  Entrance Stairs - Number of Steps: 1 thru front; 2 with rail thru garage  Bathroom Shower/Tub: Walk-in shower  Bathroom Toilet: Standard  Bathroom Equipment: Grab bars in shower, Grab bars around toilet  ADL Assistance: [address]: Needs assistance (children do shopping; cleaning service)  Ambulation Assistance: Independent  Transfer Assistance: Independent  Active : No  Occupation: Retired  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[employer]       Objective   Safety Devices  Type of Devices: All fall risk precautions in place;Call light within reach;Gait belt;Patient at risk for falls; Left in chair;Chair alarm in place;Nurse notified     Toilet Transfers  Toilet - Technique: Ambulating  Equipment Used: Grab bars  Toilet Transfer: Contact guard assistance  AROM: Within functional limits  PROM: Within functional limits  Strength: Within functional limits  Coordination: Within functional limits  ADL  Grooming: Stand by assistance;Setup  Grooming Skilled Clinical Factors: Pt stood at sink x5 mins to wash face, brush hair, and perform oral care.  He required assist to open packets and to apply toothpaste to toothbrush (?d/t vision)  LE Dressing: Minimal assistance  LE Dressing Skilled Clinical Factors: Pt donned new brief from the commode w/ min A to thread catheter  Toileting: Dependent/Total  Toileting Skilled Clinical Factors: diana  Additional Comments: Anticipate pt would be independent w/ feeding, setup UB bathing/dressing, min A LB bathing based on ROM, strength, endurance this session     Activity Tolerance  Activity Tolerance: Patient tolerated treatment well  Bed mobility  Supine to Sit: Stand by assistance  Sit to Supine: Unable to assess (in recliner at end of session)  Transfers  Sit to stand: Contact guard assistance  Stand to sit: Contact guard assistance  Transfer Comments: CGA without a device, then SBA to RW. He completed functional mobility to/from BR, then ~250 ft in hallway SBA using RW, no LOB noted. Min cues for RW safety  Vision  Vision: Impaired  Vision Exceptions: Wears glasses at all times  Hearing  Hearing: Exceptions to [facility]  Hearing Exceptions: Hard of hearing/hearing concerns  Cognition  Overall Cognitive Status: WFL  Orientation  Overall Orientation Status: Within Functional Limits                  Education Given To: Patient  Education Provided: Role of Therapy; ADL Adaptive Strategies;Transfer Training;Orientation  Education Method: Verbal;Demonstration  Barriers to Learning: None  Education Outcome: Verbalized understanding;Continued education needed           AM-PAC Score  AM-PAC Inpatient Daily Activity Raw Score: 19 (12/19/22 1028)  AM-PAC Inpatient ADL T-Scale Score : 40.22 (12/19/22 1028)  ADL Inpatient CMS 0-100% Score: 42.8 (12/19/22 1028)  ADL Inpatient CMS G-Code Modifier : CK (12/19/22 1028)      Goals  Short Term Goals  Time Frame for Short Term Goals: Prior to d/c  Short Term Goal 1: Pt will complete ADL transfers mod I  Short Term Goal 2: Pt will toilet mod I  Short Term Goal 3: Pt will groom in stance at sink mod I  Short Term Goal 4: Pt will bathe w/ supervision  Long Term Goals  Time Frame for Long Term Goals : LTG=STG  Patient Goals   Patient goals : to go home       Therapy Time   Individual Concurrent Group Co-treatment   Time In 0940         Time Out 1020         Minutes 40         Timed Code Treatment Minutes: [address], [address]

## 2022-12-19 NOTE — BRIEF OP NOTE
Brief Postoperative Note    Hudson Conley  YOB: 1930  [id number]    Pre-operative Diagnosis:  right Pleural effusion    Post-operative Diagnosis: Same    Procedure: US Guided Thoracentesis    Anesthesia: Local    Surgeons/Assistants: Marycruz Crystal MD, MD    Estimated Blood Loss: less than 5    Complications: None    Specimens: Was Obtained: serous Pleural Fluid    Findings: Technically successful right Thoracentesis    Electronically signed by Marycruz Crystal MD on 12/19/2022 at 2:47 PM

## 2022-12-19 NOTE — CONSULTS
Nutrition Note    RD consult for CHF diet education. Pt was GERTRUDIS upon visiting. Noted history of CHF. Left diet guidelines with RD contact information at bedside.     Electronically signed by Murali Bruno RD, TRINY on 12/19/22 at 1:56 PM EST    Contact: [phone]

## 2022-12-19 NOTE — PLAN OF CARE
Problem: Discharge Planning  Goal: Discharge to home or other facility with appropriate resources  Outcome: Progressing     Problem: Skin/Tissue Integrity  Goal: Absence of new skin breakdown  Description: 1. Monitor for areas of redness and/or skin breakdown  2. Assess vascular access sites hourly  3. Every 4-6 hours minimum:  Change oxygen saturation probe site  4. Every 4-6 hours:  If on nasal continuous positive airway pressure, respiratory therapy assess nares and determine need for appliance change or resting period.   Outcome: Progressing     Problem: Chronic Conditions and Co-morbidities  Goal: Patient's chronic conditions and co-morbidity symptoms are monitored and maintained or improved  Outcome: Progressing     Problem: Pain  Goal: Verbalizes/displays adequate comfort level or baseline comfort level  Outcome: Progressing     Problem: ABCDS Injury Assessment  Goal: Absence of physical injury  Outcome: Progressing

## 2022-12-19 NOTE — ACP (ADVANCE CARE PLANNING)
Advance Care Planning     Advance Care Planning Activator (Inpatient)  Conversation Note      Date of ACP Conversation: 12/19/2022     Conversation Conducted with: Patient with Decision Making Capacity    ACP Activator: Basia Leigh RN    Health Care Decision Maker:     Current Designated Health Care Decision Maker:     Primary Decision Maker: Gisela Marks Child - [phone]    Secondary Decision Maker: Yamel Nobles Child - [phone]    Today we documented Decision Maker(s) consistent with Legal Next of Kin hierarchy. Care Preferences    Ventilation: \"If you were in your present state of health and suddenly became very ill and were unable to breathe on your own, what would your preference be about the use of a ventilator (breathing machine) if it were available to you? \"      Would the patient desire the use of ventilator (breathing machine)?: yes    \"If your health worsens and it becomes clear that your chance of recovery is unlikely, what would your preference be about the use of a ventilator (breathing machine) if it were available to you? \"     Would the patient desire the use of ventilator (breathing machine)?: No      Resuscitation  \"CPR works best to restart the heart when there is a sudden event, like a heart attack, in someone who is otherwise healthy. Unfortunately, CPR does not typically restart the heart for people who have serious health conditions or who are very sick. \"    \"In the event your heart stopped as a result of an underlying serious health condition, would you want attempts to be made to restart your heart (answer \"yes\" for attempt to resuscitate) or would you prefer a natural death (answer \"no\" for do not attempt to resuscitate)? \" yes       [] Yes   [] No   Educated Patient / Sheeba Reno regarding differences between Advance Directives and portable DNR orders.     Length of ACP Conversation in minutes:  4 min    Conversation Outcomes:  [x] ACP discussion completed  [] Existing advance directive reviewed with patient; no changes to patient's previously recorded wishes  [] New Advance Directive completed  [] Portable Do Not Rescitate prepared for Provider review and signature  [] POLST/POST/MOLST/MOST prepared for Provider review and signature      Follow-up plan:    [] Schedule follow-up conversation to continue planning  [] Referred individual to Provider for additional questions/concerns   [] Advised patient/agent/surrogate to review completed ACP document and update if needed with changes in condition, patient preferences or care setting    [x] This note routed to one or more involved healthcare providers        Hannah Doran RN, BSN,   [phone]  Electronically signed by Hannah Doran RN on 12/19/2022 at 2:56 PM

## 2022-12-19 NOTE — NURSE NAVIGATOR
Consult received -pt has a history of systolic heart failure. He is having an echo. Will continue to follow, and see him prior to discharge.

## 2022-12-19 NOTE — CONSULTS
Referring Physician: Dr. Marcela Torres  Reason for Consultation: \"Elevated troponin, elevated proBNP, CHF\"  Chief Complaint: Short of breath    Subjective:   History of Present Illness:  Praful Merida is a 80 y.o. patient who presented to the hospital with complaints of shortness of breath. The patient chronically follows with [facility] cardiology for CHF. The shortness of breath was worsening over several weeks. He denied associated chest pains. He denied weight gain or lower extremity edema. He was having a new cough but denies fevers, chills, or mucus production. He underwent thoracentesis today removing 1 L of fluid. He feels the thoracentesis greatly helped. The patient was also experiencing urinary retention and had a Chiang catheter placed but it has already been removed. Past Medical History:   has a past medical history of Cancer ([facility] [address] CHF (congestive heart failure) ([facility] [address] and TIA (transient ischemic attack). Surgical History:  Denies prior cardiac surgery. Social History:   reports that he has been smoking pipe. He does not have any smokeless tobacco history on file. He reports that he does not drink alcohol and does not use drugs. Family History:  Denies premature coronary atherosclerosis. Home Medications:  Were reviewed and are listed in nursing record and/or below  Prior to Admission medications    Medication Sig Start Date End Date Taking?  Authorizing Provider   furosemide (LASIX) 40 MG tablet Take 1 tablet by mouth daily  Patient taking differently: Take 20 mg by mouth daily 5/1/21   Tobias Johnson MD   apixaban (ELIQUIS) 2.5 MG TABS tablet Take 2.5 mg by mouth 2 times daily    Historical Provider, MD   metoprolol succinate (TOPROL XL) 25 MG extended release tablet Take 12.5 mg by mouth daily    Historical Provider, MD   tamsulosin (FLOMAX) 0.4 MG capsule Take 0.4 mg by mouth nightly    Historical Provider, MD   valsartan (DIOVAN) 40 MG tablet Take 40 mg by mouth daily Historical Provider, MD   aspirin 81 MG chewable tablet Take 81 mg by mouth daily    Historical Provider, MD   allopurinol (ZYLOPRIM) 100 MG tablet Take 200 mg by mouth daily    Historical Provider, MD        CURRENT Medications:  perflutren lipid microspheres (DEFINITY) injection 1.5 mL, ONCE PRN  [START ON 12/20/2022] metoprolol succinate (TOPROL XL) extended release tablet 12.5 mg, Daily  furosemide (LASIX) injection 20 mg, BID  ipratropium-albuterol (DUONEB) nebulizer solution 1 ampule, Q4H PRN  polyethylene glycol (GLYCOLAX) packet 17 g, Once  allopurinol (ZYLOPRIM) tablet 200 mg, Daily  aspirin chewable tablet 81 mg, Daily  Vitamin D (CHOLECALCIFEROL) tablet 2,000 Units, Daily  isosorbide mononitrate (IMDUR) extended release tablet 30 mg, Daily  tamsulosin (FLOMAX) capsule 0.4 mg, Nightly  sodium chloride flush 0.9 % injection 5-40 mL, 2 times per day  sodium chloride flush 0.9 % injection 5-40 mL, PRN  0.9 % sodium chloride infusion, PRN  famotidine (PEPCID) tablet 20 mg, Nightly  ondansetron (ZOFRAN-ODT) disintegrating tablet 4 mg, Q8H PRN   Or  ondansetron (ZOFRAN) injection 4 mg, Q6H PRN  polyethylene glycol (GLYCOLAX) packet 17 g, Daily PRN  acetaminophen (TYLENOL) tablet 650 mg, Q6H PRN   Or  acetaminophen (TYLENOL) suppository 650 mg, Q6H PRN  apixaban (ELIQUIS) tablet 2.5 mg, BID    Allergies:  Patient has no known allergies. Review of Systems:   Constitutional: no unanticipated weight loss. There's been no change in energy level, sleep pattern, or activity level. No fevers, chills. Eyes: No visual changes or diplopia. No scleral icterus. ENT: No Headaches, hearing loss or vertigo. No mouth sores or sore throat. Cardiovascular: as reviewed in HPI  Respiratory: + cough. No wheezing, no sputum production. No hemoptysis. Gastrointestinal: No abdominal pain, appetite loss, blood in stools. No change in bowel or bladder habits.   Genitourinary: No dysuria, trouble voiding, or hematuria. Musculoskeletal:  No gait disturbance, no joint complaints. Integumentary: No rash or pruritis. Neurological: No headache, diplopia, change in muscle strength, numbness or tingling. Psychiatric: No anxiety or depression. Endocrine: No temperature intolerance. No excessive thirst, fluid intake, or urination. No tremor. Hematologic/Lymphatic: No abnormal bruising or bleeding, blood clots or swollen lymph nodes. Allergic/Immunologic: No nasal congestion or hives. Objective:   PHYSICAL EXAM:    Vitals:    12/19/22 1154   BP: 98/65   Pulse: 86   Resp:    Temp:    SpO2: (!) 89%    Weight: 144 lb 6.4 oz (65.5 kg)       General Appearance:  Alert, cooperative, no distress, elderly. Head:  Normocephalic, without obvious abnormality, atraumatic. Eyes:  Pupils equal and round. No scleral icterus. Mouth: Moist mucosa, no pharyngeal erythema. Nose: Nares normal. No drainage or sinus tenderness. Neck: Supple, symmetrical, trachea midline. No adenopathy. No tenderness/mass/nodules. No carotid bruit or elevated JVD. Lungs:   Respirations unlabored. No crackles noted. Diminished breath sounds bilateral bases. Chest Wall:  No tenderness or deformity. Heart:  Regular rate. S1/S2 normal. No murmur, rub, or gallop. Abdomen:   Soft, non-tender, bowel sounds active. Musculoskeletal: No muscle wasting or digital clubbing. Extremities: Extremities normal, atraumatic. No cyanosis or edema. Pulses: 2+ radial and carotid pulses, symmetric. Skin: No rashes or lesions. Pysch: Normal mood and affect. Alert and oriented x 4.    Neurologic: Normal gross motor and sensory exam.       Labs     CBC:   Lab Results   Component Value Date/Time    WBC 9.9 12/18/2022 11:06 AM    RBC 4.18 12/18/2022 11:06 AM    HGB 13.4 12/18/2022 11:06 AM    HCT 41.3 12/18/2022 11:06 AM    MCV 98.7 12/18/2022 11:06 AM    RDW 15.3 12/18/2022 11:06 AM     12/18/2022 11:06 AM     CMP:  Lab Results   Component Value Date/Time     12/19/2022 08:11 AM    K 4.2 12/19/2022 08:11 AM    K 4.9 04/28/2021 07:32 PM     12/19/2022 08:11 AM    CO2 23 12/19/2022 08:11 AM    BUN 57 12/19/2022 08:11 AM    CREATININE 2.3 12/19/2022 08:11 AM    GFRAA 46 04/30/2021 06:34 AM    GFRAA 50 09/03/2011 07:00 PM    AGRATIO 1.2 12/17/2022 01:03 PM    LABGLOM 26 12/19/2022 08:11 AM    GLUCOSE 109 12/19/2022 08:11 AM    PROT 7.1 12/17/2022 01:03 PM    PROT 7.4 09/03/2011 07:00 PM    CALCIUM 10.6 12/19/2022 08:11 AM    BILITOT 0.7 12/17/2022 01:03 PM    ALKPHOS 76 12/17/2022 01:03 PM    AST 26 12/17/2022 01:03 PM    ALT 24 12/17/2022 01:03 PM     PT/INR:  No results found for: PTINR  HgBA1c:No results found for: LABA1C  Lab Results   Component Value Date    TROPONINI 0.43 (H) 12/19/2022       Cardiac Data     EKG: Personally interpreted. 12/17/2022. Sinus rhythm with PVC. Left anterior fascicular block. Right bundle branch block. Echo: 6/21/2021 ([facility]). Overall left ventricular ejection fraction is estimated to be 20-25%. Regional wall motion abnormalities are present. The left ventricular apex is hypokinetic. LV thrombus can not be excluded. There is mild eccentric left ventricular hypertrophy. Mildly dilated aortic root & ascending aorta. The aortic valve is trileaflet. No hemodynamically significant valvular aortic stenosis or regurgitation. The diastolic function is impaired and classified as Grade 1 (impaired relaxation). Right ventricular systolic pressure is normal.   The IVC size is normal.     Telemetry: Personally interpreted. Sinus. Assessment and Plan   1) Acute on chronic systolic heart failure. EF 20-25% (2021). Goals of treatment should be conservative/symptom based and will consult palliative care. No ACE-I/ARB/Aldactone/SGLT2i with severity of CKD. Continue beta-blocker. Not an ICD candidate secondary to advanced age. 2) Demand ischemia.   History is not consistent with acute coronary syndrome. Troponin trend is relatively flat. Likely chronically elevated secondary to CHF and CKD. No plans for stress testing or angiography with advanced age and CKD. 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 Nephrology following. Urinary at retention may acutely be contributing. Currently with Chiang catheter in place. 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 Code status: Will consult palliative care with the patient's comorbidities and advanced age to review code status and goals of treatment. Overall, the problems requiring hospitalization are high in severity. Thank you for allowing us to participate in the care of Shilamarilee Wangai. Matthew Marlo.  Baylee Domingo, [address]  12/19/2022 2:19 PM

## 2022-12-19 NOTE — DISCHARGE INSTR - COC
Continuity of Care Form    Patient Name: Shannon Archer   :  1930  MRN:  [id number]    Admit date:  2022  Discharge date:  ***    Code Status Order: Full Code   Advance Directives:     Admitting Physician:  Artemio Koroma MD  PCP: Stuart Blankenship    Discharging Nurse: [facility] Unit/Room#: [id number]/[id number]  Discharging Unit Phone Number: ***    Emergency Contact:   Extended Emergency Contact Information  Primary Emergency Contact: aDwson Velasquez [place]  Home Phone: [phone]  Mobile Phone: [phone]  Relation: Child  Secondary Emergency Contact: Malden Herb  Home Phone: [phone]  Relation: Child  Preferred language: English    Past Surgical History:  No past surgical history on file.     Immunization History:   Immunization History   Administered Date(s) Administered    COVID-19, MODERNA BLUE border, Primary or Immunocompromised, (age 12y+), IM, 100 mcg/0.5mL 2021, 2021       Active Problems:  Patient Active Problem List   Diagnosis Code    Acute on chronic systolic HF (heart failure) ([facility]) I50.23    Essential hypertension I10    Stage 3b chronic kidney disease (HCC) N18.32    Moderate malnutrition ([address] E44.0    SOB (shortness of breath) R06.02       Isolation/Infection:   Isolation            No Isolation          Patient Infection Status       Infection Onset Added Last Indicated Last Indicated By Review Planned Expiration Resolved Resolved By    None active    Resolved    COVID-19 (Rule Out) 22 COVID-19, Rapid (Ordered)   22 Rule-Out Test Resulted    COVID-19 (Rule Out) 21 SARS-CoV-2 NAAT (Rapid) (Ordered)   21 Rule-Out Test Resulted            Nurse Assessment:  Last Vital Signs: BP 98/65   Pulse 86   Temp 97.6 °F (36.4 °C) (Axillary)   Resp 14   Ht 5' 10\" (1.778 m)   Wt 144 lb 6.4 oz (65.5 kg)   SpO2 (!) 89%   BMI 20.72 kg/m²     Last documented pain score (0-10 scale): Pain Level: 0  Last Weight:   Wt Readings from Last 1 Encounters:   22 144 lb 6.4 oz (65.5 kg)     Mental Status:  {IP PT MENTAL STATUS:}    IV Access:  { NELL IV ACCESS:699396773}    Nursing Mobility/ADLs:  Walking   {CHP DME XZLY:401670658}  Transfer  {CHP DME XTDT:829949870}  Bathing  {CHP DME XVNC:509558895}  Dressing  {CHP DME ZLUX:991048416}  Toileting  {CHP DME BKJD:840831168}  Feeding  {CHP DME NSAX:162577865}  Med Admin  {CHP DME NBNI:376584596}  Med Delivery   { NELL MED Delivery:172799125}    Wound Care Documentation and Therapy:        Elimination:  Continence: Bowel: {YES / EV:32080}  Bladder: {YES / OK:24598}  Urinary Catheter: {Urinary Catheter:095487926}   Colostomy/Ileostomy/Ileal Conduit: {YES / XB:53863}       Date of Last BM: ***    Intake/Output Summary (Last 24 hours) at 2022 1455  Last data filed at 2022 1315  Gross per 24 hour   Intake 360 ml   Output 2200 ml   Net -1840 ml     I/O last 3 completed shifts:   In: 5 [P.O.:720]  Out: 3850 [Urine:3850]    Safety Concerns:     [address] Safety Concerns:266707899}    Impairments/Disabilities:      [address] Impairments/Disabilities:955886173}    Nutrition Therapy:  Current Nutrition Therapy:   [address] [employer] Diet List:021160399}    Routes of Feeding: {CHP DME Other Feedings:767078800}  Liquids: {Slp liquid thickness:33549}  Daily Fluid Restriction: {CHP DME Yes amt example:748736112}  Last Modified Barium Swallow with Video (Video Swallowing Test): {Done Not Done VGEO:142286619}    Treatments at the Time of Hospital Discharge:   Respiratory Treatments: ***  Oxygen Therapy:  {Therapy; copd oxygen:07948}  Ventilator:    {[facility] Vent JHQ}    Rehab Therapies: {THERAPEUTIC INTERVENTION:7402664361}  Weight Bearing Status/Restrictions: [address] Weight Bearin}  Other Medical Equipment (for information only, NOT a DME order):  {EQUIPMENT:791695182}  Other Treatments:   Heart Failure Instructions for Daily Management  Patient was treated for acute on chronic combined systolic and diastolic heart failure. he  will require the following:    Please weigh daily on the same scale and approximately the same time of day. Report weight gain of 3 pounds/day or 5 pounds/week to : [facility] [phone]. Please use hospital discharge weight as baseline reference. Please monitor for signs and symptoms of and report to MD:  Worsening Heart Failure: sudden weight gain, shortness of breath, lower extremity or general edema/swelling, abdominal bloating/swelling, inability to lie flat, intolerance to usual activity, or cough (especially at night). Report these finding even if no increase in weight. Dehydration:  having difficulty or a decrease in urination, dizziness, worsening fatigue, or new onset/worsening of generalized weakness. Please continue a LOW SODIUM diet and LIMIT fluid intake to 48 - 64 ounces ( 1.5 - 2 liters) per day. Call [facility] [phone] and/or Pili Chong @ [phone] with any questions or concerns. Please continue heart failure education to patient and family/support system. See After Visit Summary for hospital follow up appointment details. Consider spiritual care referral for support and/or completion of advance directives [phone]. Consider: [place] [address] telehealth program if patient agreeable and able to participate and palliative care consult for ongoing goals of care, end of life, and/or chronic disease management discussions.        Patient's personal belongings (please select all that are sent with patient):  {SOFIA DME Belongings:155337427}    RN SIGNATURE:  {Esignature:793198135}    CASE MANAGEMENT/SOCIAL WORK SECTION    Inpatient Status Date: 12/17/2022    Readmission Risk Assessment Score:  Readmission Risk              Risk of Unplanned Readmission:  15           Discharging to Facility/ Agency   Name: [facility]  Address:  [address] [place] [address] [place]   Phone:  [phone]  Fax:  [phone]      / signature: Electronically signed by Sherry Gill on 12/21/2022 at 10:52 AM    PHYSICIAN SECTION    Prognosis: Fair    Condition at Discharge: Stable    Rehab Potential (if transferring to Rehab): Good    Recommended Labs or Other Treatments After Discharge: follow up with your PCP in 1 week    Physician Certification: I certify the above information and transfer of [place]  is necessary for the continuing treatment of the diagnosis listed and that he requires Home Care for greater 30 days.      Update Admission H&P: No change in H&P    PHYSICIAN SIGNATURE:  Electronically signed by Marlon Bowles MD on 12/20/22 at 5:22 PM EST

## 2022-12-19 NOTE — CARE COORDINATION
Talked to son Vega, son's are able to stay with him 24/7 for the first few days/first week home & agreeable to Moy Felton also.     Carlton Lebron RN, BSN,   [phone]  Electronically signed by Carlton Lebron RN on 12/19/2022 at 3:09 PM

## 2022-12-19 NOTE — PROGRESS NOTES
Physical Therapy  [facility]  Physical Therapy Initial Assessment    Name: Tia Sellers  : 1930  MRN: [id number]  Date of Service: 2022    Discharge Recommendations:  Therapy recommended at discharge, 24 hour supervision or assist, Home with Home health PT   PT Equipment Recommendations  Equipment Needed: Yes  Mobility Devices: Jose Leaver: Rolling    Tia Ear scored a 20/24 on the AM-PAC short mobility form. Current research shows that an AM-PAC score of 18 or greater is typically associated with a discharge to the patient's home setting. Based on the patient's AM-PAC score and their current functional mobility deficits, it is recommended that the patient have 2-3 sessions per week of Physical Therapy at d/c to increase the patient's independence. At this time, this patient demonstrates the endurance and safety to discharge home with HHPT and a follow up treatment frequency of 2-3x/wk. Please see assessment section for further patient specific details. If patient discharges prior to next session this note will serve as a discharge summary. Please see below for the latest assessment towards goals.        HOME HEALTH CARE: LEVEL 3 SAFETY       -Initial home health evaluation to occur within 24-48 hours, in patient home   -Home health agency to establish plan of care for patient over 60 day period   -Medication Reconciliation   -PT/OT/Speech evaluations in home within 24-48 hours of discharge; including  -DME and home safety   -Frontload therapy 5 days, then 3x a week   -OT to evaluate if patient has [address] needs for personal care   - evaluation within 24-48 hours, includes evaluation of resources   and insurance to determine [place], [place], LTC, and Medicaid options   -PCP Visit scheduled within three to seven days of discharge      Patient Diagnosis(es): The primary encounter diagnosis was Acute on chronic combined systolic and diastolic congestive heart failure ([facility] [address] Diagnoses of Goals of care, counseling/discussion, HOLLI (acute kidney injury) ([facility] [address] Pipe smoker, Constipation, unspecified constipation type, Elevated troponin, and Pleural effusion were also pertinent to this visit. Past Medical History:  has a past medical history of Cancer ([facility] [address] CHF (congestive heart failure) ([facility] [address] and TIA (transient ischemic attack). Past Surgical History:  has no past surgical history on file. Assessment   Body Structures, Functions, Activity Limitations Requiring Skilled Therapeutic Intervention: Decreased functional mobility ; Decreased strength;Decreased safe awareness;Decreased balance;Decreased endurance  Assessment: Patient presented to the emergency room on 12/17/22 with 2 weeks of for worsening shortness of breath. Prior to admission, pt living alone, independent with ADLs and ambulation without device. Pt currently functioning below baseline - noted he has Parkinson's like shuffling with gait initiation. Anticipate return home with initial 24hr supv and level 3 HHPT. Recommend RW at discharge. Treatment Diagnosis: impaired mobility  Therapy Prognosis: Good  Decision Making: Medium Complexity  History: see above  Exam: see below  Clinical Presentation: see above  Barriers to Learning: evolving  Requires PT Follow-Up: Yes  Activity Tolerance  Activity Tolerance: Patient tolerated treatment well     Plan   Physcial Therapy Plan  General Plan: 3-5 times per week  Current Treatment Recommendations: Strengthening, Balance training, Functional mobility training, Transfer training, Gait training, Endurance training, Neuromuscular re-education, Safety education & training, Equipment evaluation, education, & procurement, Patient/Caregiver education & training, Therapeutic activities  Safety Devices  Type of Devices:  All fall risk precautions in place, Call light within reach, Gait belt, Patient at risk for falls, Left in chair, Chair alarm in place, Nurse notified Restrictions  Restrictions/Precautions  Restrictions/Precautions: Fall Risk     Subjective   General  Chart Reviewed: Yes  Patient assessed for rehabilitation services?: Yes  Additional Pertinent Hx: Patient presented to the emergency room on 12/17/22 with 2 weeks of for worsening shortness of breath.   Response To Previous Treatment: Not applicable  Family / Caregiver Present: No  Referring Practitioner: Matteo Hart MD  Referral Date : 12/18/22  Diagnosis: Pulmonary edema  Follows Commands: Within Functional Limits  Subjective  Subjective: Pt is agreeable to PT - reports he is quite weaker than normal.         Social/Functional History  Social/Functional History  Lives With: Alone  Type of Home: House  Home Layout: One level, Laundry in basement (stair lift to basement)  Home Access: Stairs to enter with rails  Entrance Stairs - Number of Steps: 1 thru front; 2 with rail thru garage  Bathroom Shower/Tub: Walk-in shower  Bathroom Toilet: Standard  Bathroom Equipment: Grab bars in shower, Grab bars around toilet  ADL Assistance: Independent  Homemaking Assistance: Needs assistance (children do shopping; cleaning service)  Ambulation Assistance: Independent  Transfer Assistance: Independent  Active : No  Occupation: Retired  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[employer] [place]    Vision/Hearing  Vision  Vision: Impaired  Vision Exceptions: Wears glasses at all times  Hearing  Hearing: Exceptions to [facility]  Hearing Exceptions: Hard of hearing/hearing concerns      Cognition   Orientation  Overall Orientation Status: Within Functional Limits  Cognition  Overall Cognitive Status: WFL     Objective   Gross Assessment  Strength: Generally decreased, functional     Bed mobility  Supine to Sit: Stand by assistance  Sit to Supine: Unable to assess (in recliner at end of session)  Transfers  Sit to Stand: Stand by assistance  Stand to Sit: Stand by assistance  Ambulation  Surface: Level tile  Device: No Device  Assistance: Contact guard assistance  Gait Deviations: Slow Claire;Decreased step length;Decreased step height;Shuffles  Distance: 25'  Comments: Pt unsteady with no device. Initially starts out with shuffling gait pattern. More Ambulation?: Yes  Ambulation 2  Surface - 2: level tile  Device 2: Rolling Walker  Assistance 2: Stand by assistance  Gait Deviations: Slow Claire;Decreased step length;Decreased step height  Distance: 250  Comments: improved steadiness with walker     Balance  Posture: Good  Sitting - Static: Good  Sitting - Dynamic: Good  Standing - Static: Fair (without device)  Standing - Dynamic: Fair; - (without device)             AM-PAC Score  AM-PAC Inpatient Mobility Raw Score : 20 (12/19/22 1025)  AM-PAC Inpatient T-Scale Score : 47.67 (12/19/22 1025)  Mobility Inpatient CMS 0-100% Score: 35.83 (12/19/22 1025)  Mobility Inpatient CMS G-Code Modifier : CJ (12/19/22 1025)            Goals  Short Term Goals  Time Frame for Short Term Goals: by acute discharge  Short Term Goal 1: bed mobility with SBA  Short Term Goal 2: sit<>stand with supv  Short Term Goal 3: ambulate > 48' mod I with RW  Patient Goals   Patient Goals : none stated       Education  Patient Education  Education Given To: Patient  Education Provided: Role of Therapy;Plan of Care  Education Method: Verbal  Barriers to Learning: None  Education Outcome: Verbalized understanding;Continued education needed      Therapy Time   Individual Concurrent Group Co-treatment   Time In 0940         Time Out 1020         Minutes 40         Timed Code Treatment Minutes: 25 Minutes       Jones Sandhu PT

## 2022-12-19 NOTE — PROGRESS NOTES
Hospitalist Progress Note      PCP: Charli Orellana    Chief Complaint. Presented to hospital for SOB    Date of Admission: 12/17/2022    Subjective:   seen at bedside, he is on room air, denies chest pain, nausea, vomiting, shortness of breath, fever or chills. mention feels overall better    Medications:  Reviewed    Infusion Medications    sodium chloride       Scheduled Medications    [START ON 12/20/2022] metoprolol succinate  12.5 mg Oral Daily    furosemide  20 mg IntraVENous BID    polyethylene glycol  17 g Oral Once    allopurinol  200 mg Oral Daily    Vitamin D  2,000 Units Oral Daily    tamsulosin  0.4 mg Oral Nightly    sodium chloride flush  5-40 mL IntraVENous 2 times per day    famotidine  20 mg Oral Nightly    apixaban  2.5 mg Oral BID     PRN Meds: ipratropium-albuterol, sodium chloride flush, sodium chloride, ondansetron **OR** ondansetron, polyethylene glycol, acetaminophen **OR** acetaminophen      Intake/Output Summary (Last 24 hours) at 12/19/2022 1803  Last data filed at 12/19/2022 1457  Gross per 24 hour   Intake 360 ml   Output 2300 ml   Net -1940 ml         Physical Exam Performed:    BP 91/69   Pulse 86   Temp 97.3 °F (36.3 °C) (Oral)   Resp 18   Ht 5' 10\" (1.778 m)   Wt 144 lb 6.4 oz (65.5 kg)   SpO2 94%   BMI 20.72 kg/m²     General appearance: No apparent distress, on room air  HEENT:  Conjunctivae/corneas clear. Neck: Supple, with full range of motion. Respiratory:  Normal respiratory effort. Clear to auscultation, bilaterally without Rales/Wheezes/Rhonchi. Cardiovascular: Regular rate and rhythm with normal S1/S2 without murmurs or rubs  Abdomen: Soft, non-tender, non-distended, normal bowel sounds. Musculoskeletal: No cyanosis or edema bilaterally  Neurologic:  without any focal sensory/motor deficits.  grossly non-focal.  Psychiatric: Alert and oriented, Normal mood  Peripheral Pulses: +2 palpable, equal bilaterally       Labs:   Recent Labs     12/17/22  1303 12/18/22  1106   WBC 12.3* 9.9   HGB 13.4* 13.4*   HCT 41.4 41.3    194       Recent Labs     12/17/22  1303 12/18/22  1106 12/19/22  0811    138 140   K 4.4 4.0 4.2    103 104   CO2 21 19* 23   BUN 53* 58* 57*   CREATININE 2.1* 2.3* 2.3*   CALCIUM 11.1* 10.5 10.6       Recent Labs     12/17/22  1303   AST 26   ALT 24   BILITOT 0.7   ALKPHOS 76       Recent Labs     12/18/22  1415   INR 1.76*     Recent Labs     12/19/22  0008 12/19/22  0811 12/19/22  1105   TROPONINI 0.43* 0.42* 0.43*         Urinalysis:      Lab Results   Component Value Date/Time    NITRU Negative 12/18/2022 02:00 AM    WBCUA 4 12/18/2022 02:00 AM    BACTERIA Rare 12/18/2022 02:00 AM    RBCUA 186 12/18/2022 02:00 AM    BLOODU LARGE 12/18/2022 02:00 AM    SPECGRAV 1.008 12/18/2022 02:00 AM    GLUCOSEU Negative 12/18/2022 02:00 AM    GLUCOSEU NEGATIVE 09/03/2011 07:10 PM       Radiology:  XR CHEST PORTABLE   Final Result   Status post right-sided thoracentesis with interval resolution of the pleural   effusion and resolving right basilar opacity      Stable cardiomegaly with resolving central pulmonary congestion and   decreasing left basilar opacity. US THORACENTESIS Which side should the procedure be performed? Right   Final Result   Successful ultrasound guided right thoracentesis. US RENAL COMPLETE   Final Result   No hydronephrosis noted. Left renal cysts      Mild prostatomegaly         XR CHEST 1 VIEW   Final Result   No evidence of pneumothorax. Decreased extent of right pleural effusion with associated atelectasis. XR CHEST (2 VW)   Final Result   Mild pulmonary edema with moderate right pleural effusion.                Assessment/Plan:    Active Hospital Problems    Diagnosis     Demand ischemia ([facility] [I24.8]      Priority: Medium    Acute kidney injury superimposed on CKD ([facility] [N17.9, N18.9]      Priority: Medium    SOB (shortness of breath) [R06.02]      Priority: Medium    Acute on chronic systolic heart failure (HCC) [I50.23]      Pulmonary edema and right-sided pleural effusion  Acute on chronic CHF  Start patient on Lasix 20 mg IV twice daily  Patient received 80 mg IV Lasix in the emergency room  He f/u with his cardiologist at [facility]    Anticoagulation  Continue on Eliquis     Hypertension  Continue on home medication  Imdur metoprolol and valsartan on     Continue other home medication  That includes allopurinol aspirin      Urinary symptoms  Patient reports urgency and frequency  Continue on Flomax  Check pre and postvoid residuals    DVT Prophylaxis: on eliquis  Diet: ADULT DIET; Regular; Low Sodium (2 gm); 1200 ml  Code Status: Full Code    PT/OT Eval Status: ordered    Dispo/Plan of care - Tried to call son/HOUSTON Johnson on the number in chair x2 - not successful, no option to leave VM,   Plan for thoracentesis today, diagnostic studies ordered, Cardiology consulted. Cr worsening consult nephrology  Monitor BMP  Echo - Ejection fraction is visually estimated to be 15 %. Severe global   hypokinesis.    Grade II diastolic dysfunction with elevated LV filling pressures  Continue IV Lasix, decreased dose to 20 BID      Marlon Bowles MD

## 2022-12-20 LAB
ANION GAP SERPL CALCULATED.3IONS-SCNC: 13 MMOL/L (ref 3–16)
BUN BLDV-MCNC: 57 MG/DL (ref 7–20)
CALCIUM SERPL-MCNC: 10.6 MG/DL (ref 8.3–10.6)
CHLORIDE BLD-SCNC: 105 MMOL/L (ref 99–110)
CO2: 22 MMOL/L (ref 21–32)
CREAT SERPL-MCNC: 2.2 MG/DL (ref 0.8–1.3)
GFR SERPL CREATININE-BSD FRML MDRD: 27 ML/MIN/{1.73_M2}
GLUCOSE BLD-MCNC: 98 MG/DL (ref 70–99)
MAGNESIUM: 2.1 MG/DL (ref 1.8–2.4)
POTASSIUM SERPL-SCNC: 4 MMOL/L (ref 3.5–5.1)
PRO-BNP: ABNORMAL PG/ML (ref 0–449)
SODIUM BLD-SCNC: 140 MMOL/L (ref 136–145)

## 2022-12-20 PROCEDURE — 2580000003 HC RX 258: Performed by: HOSPITALIST

## 2022-12-20 PROCEDURE — 6360000002 HC RX W HCPCS: Performed by: INTERNAL MEDICINE

## 2022-12-20 PROCEDURE — 94760 N-INVAS EAR/PLS OXIMETRY 1: CPT

## 2022-12-20 PROCEDURE — 1200000000 HC SEMI PRIVATE

## 2022-12-20 PROCEDURE — 36415 COLL VENOUS BLD VENIPUNCTURE: CPT

## 2022-12-20 PROCEDURE — 6370000000 HC RX 637 (ALT 250 FOR IP): Performed by: INTERNAL MEDICINE

## 2022-12-20 PROCEDURE — 6370000000 HC RX 637 (ALT 250 FOR IP): Performed by: HOSPITALIST

## 2022-12-20 PROCEDURE — 80048 BASIC METABOLIC PNL TOTAL CA: CPT

## 2022-12-20 PROCEDURE — 83880 ASSAY OF NATRIURETIC PEPTIDE: CPT

## 2022-12-20 PROCEDURE — 97530 THERAPEUTIC ACTIVITIES: CPT

## 2022-12-20 PROCEDURE — 83735 ASSAY OF MAGNESIUM: CPT

## 2022-12-20 RX ORDER — FAMOTIDINE 20 MG/1
20 TABLET, FILM COATED ORAL NIGHTLY
Qty: 60 TABLET | Refills: 3 | Status: SHIPPED | OUTPATIENT
Start: 2022-12-20

## 2022-12-20 RX ORDER — TORSEMIDE 10 MG/1
10 TABLET ORAL 2 TIMES DAILY
Qty: 60 TABLET | Refills: 0 | Status: SHIPPED | OUTPATIENT
Start: 2022-12-20 | End: 2023-01-19

## 2022-12-20 RX ORDER — TORSEMIDE 20 MG/1
10 TABLET ORAL 2 TIMES DAILY
Status: DISCONTINUED | OUTPATIENT
Start: 2022-12-20 | End: 2022-12-21 | Stop reason: HOSPADM

## 2022-12-20 RX ADMIN — ALLOPURINOL 200 MG: 100 TABLET ORAL at 08:18

## 2022-12-20 RX ADMIN — SODIUM CHLORIDE, PRESERVATIVE FREE 10 ML: 5 INJECTION INTRAVENOUS at 08:21

## 2022-12-20 RX ADMIN — TORSEMIDE 10 MG: 20 TABLET ORAL at 15:31

## 2022-12-20 RX ADMIN — Medication 2.5 MG: at 21:04

## 2022-12-20 RX ADMIN — SODIUM CHLORIDE, PRESERVATIVE FREE 10 ML: 5 INJECTION INTRAVENOUS at 21:04

## 2022-12-20 RX ADMIN — TAMSULOSIN HYDROCHLORIDE 0.4 MG: 0.4 CAPSULE ORAL at 21:04

## 2022-12-20 RX ADMIN — METOPROLOL SUCCINATE 12.5 MG: 25 TABLET, EXTENDED RELEASE ORAL at 08:18

## 2022-12-20 RX ADMIN — FUROSEMIDE 20 MG: 10 INJECTION, SOLUTION INTRAMUSCULAR; INTRAVENOUS at 08:18

## 2022-12-20 RX ADMIN — Medication 2000 UNITS: at 08:17

## 2022-12-20 RX ADMIN — TORSEMIDE 10 MG: 20 TABLET ORAL at 23:20

## 2022-12-20 RX ADMIN — Medication 2.5 MG: at 08:18

## 2022-12-20 RX ADMIN — FAMOTIDINE 20 MG: 20 TABLET, FILM COATED ORAL at 21:04

## 2022-12-20 ASSESSMENT — PAIN SCALES - GENERAL
PAINLEVEL_OUTOF10: 0
PAINLEVEL_OUTOF10: 0

## 2022-12-20 ASSESSMENT — ENCOUNTER SYMPTOMS
SHORTNESS OF BREATH: 0
CONSTIPATION: 0
DIARRHEA: 0
COUGH: 0

## 2022-12-20 NOTE — PROGRESS NOTES
This RN called and spoke with son,Emile and attempted to reach Sruthi Wagoner but could not leave message on cell phone. Family is unable to  pt today but will be prepared to do so tomorrow. Pt is agreeable to d/c tomorrow. Pt resting comfortably in bed. Bed alarm on. Call light and bedside table with fresh ice water within reach.

## 2022-12-20 NOTE — CONSULTS
PALLIATIVE MEDICINE CONSULTATION     Patient name:Chuy Jackson   KQX:[id number]    CYY:[birth year]  Room/Bed:J5E-8779/4256-01   LOS: 3 days         Date of consult:12/20/2022    Consult Information  Palliative Medicine Consult performed by: YULIYA Mancia CNP     Inpatient consult to Palliative Care  Consult performed by: YULIYA Mancia CNP  Consult ordered by: Trang Trinh MD  Reason for consult: Goals of care, code status. ASSESSMENT/RECOMMENDATIONS     80 y.o. male with shortness of breath, HOLLI, and debility. Symptom Management:  Dyspnea - secondary to CHF exacerbation. Management per cardiology/attending. On Lasix twice daily. Thoracentesis on 12-. Symptoms have improved. Educated on disease process, reviewed echocardiogram, as well as etiology. HOLLI - management per nephrology. Remains above baseline. Educated on the balance of diuretics and CKD. Debility -lives at home, feels as though he can manage well. Everything he needs is on the ground floor. Does do laundry in the basement, has a chair lift. Sons are active and care. Goals of Care -see below    Patient/Family Goals of Care :    Goals of care conversation had with the patient. I explained the highly personal nature of deciding how to approach serious illness, and outlined two extremes--continuing disease-focused, morbidity-inducing treatment with the possibility of prolonging life vs. focusing on comfort/quality of life while allowing disease progression and natural death. Emphasis was placed on the absence of a \"right\" answer, as opposed to making good decisions based on personal preferences and circumstances, with ongoing reevaluation and adjustment in treatment goals as the clinical course unfolds. Per outpatient cardiology notes (Dr. Surendra Hay), the patient has declined an ICD, patient verified.   We discussed CODE STATUS at length, patient does not wish to undergo resuscitative measures in the event of cardiac or respiratory arrest.  He is agreeable to medical management up to that point, pending the situation, but would like to focus on managing symptoms mainly in his home. We discussed options such as home palliative care and the advanced cardiac program through [address] [place] [address]. Patient is currently active with an [address] [place] [address] visiting NP. Would like more information on the advanced cardiac program to discuss with his family, pamphlet given. CODE STATUS updated, DNR form given to patient to keep in his home. Disposition/Discharge Plan:   Pending, will return home. Is active with [facility]. Information given on advanced cardiac program through [address] [place] [address]. Advance Directives:  Surrogate Decision Maker: Ronnell Koch, rashad. Code status:  DNR-CCA    Thank you for allowing us to participate in the care of this patient. HISTORY     CC: Shortness of breath. HPI: The patient is a 80 y.o. 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 Presented to the ED with shortness of breath that was worsening over several weeks. Admitted with CHF exacerbation and HOLLI. Palliative Medicine SymptomScreening/ROS:    Review of Systems   Constitutional:  Positive for activity change and fatigue. Respiratory:  Negative for cough and shortness of breath. Cardiovascular:  Negative for chest pain and leg swelling. Gastrointestinal:  Negative for constipation and diarrhea. Genitourinary: Negative. Neurological:  Positive for weakness. Psychiatric/Behavioral: Negative. A complete 10 count ROS was obtained. Pertinent positives mentioned above in HPI/ROS. All others if not mentioned are negative. Palliative Performance Scale:     [x] 60%  Amb reduced; Sig dz. Can't do hobbies/housework; Intake normal or reduced, Occasional assist; LOC full/confusion   [] 50%  Mainly sit/lie; Extensive disease.  Mainly assist, Intake normal or reduced; Occasional assist; LOC full/confusion   [] 40%  Mainly in bed; Extensive disease; Mainly assist; Intake normal or reduced; Occasional assist; LOC full/confusion   [] 30%  Bed bound, Extensive disease; Total care; Intake reduced; LOC full/confusion   [] 20%  Bed bound; Extensive disease; Total care; Intake minimal; Drowsy/coma   [] 10%  Bed bound; Extensive disease; Total care; Mouth care only; Drowsy/coma   []  0%   Death       Home med list and hospital medications reviewed in chart as of 12/20/2022     EXAM     Vitals:    12/20/22 0847   BP:    Pulse:    Resp:    Temp:    SpO2: 95%       Physical Exam  Cardiovascular:      Pulses: Normal pulses. Pulmonary:      Effort: Pulmonary effort is normal.   Abdominal:      Palpations: Abdomen is soft. Musculoskeletal:      Right lower leg: No edema. Left lower leg: No edema. Skin:     General: Skin is warm and dry. Neurological:      Mental Status: He is alert and oriented to person, place, and time. Motor: Weakness present. Psychiatric:         Mood and Affect: Mood normal.         Thought Content:  Thought content normal.         Judgment: Judgment normal.        Current labs in the epic chart reviewed as of 12/20/2022   Review of previous notes, admits, labs, radiology and testing relevant to this consult done in this chart today 12/20/2022      Total time: 70 minutes  >50% of time spent counseling patient at bedside or POA/family member if applicable , reviewing information and discussing care, coordinating with care team  Signed By: Electronically signed by YULIYA Mcdonnell CNP on 12/20/2022 at 9:50 AM  Palliative Medicine   [phone]    December 20, 2022

## 2022-12-20 NOTE — PROGRESS NOTES
Occupational Therapy  Facility/Department: [facility]  Occupational Therapy Daily Treatment    Name: Siobhan Sargent  : 1930  MRN: [id number]  Date of Service: 2022    Discharge Recommendations:  24 hour supervision or assist, Home with Home health OT, S Level 1, Continue to assess pending progress  OT Equipment Recommendations  ADL Assistive Devices: Shower Chair with back  Siobhan Sargent scored a 20/24 on the AM-PAC ADL Inpatient form. Current research shows that an AM-PAC score of 18 or greater is typically associated with a discharge to the patient's home setting. Based on the patient's AM-PAC score, and their current ADL deficits, it is recommended that the patient have 2-3 sessions per week of Occupational Therapy at d/c to increase the patient's independence. At this time, this patient demonstrates the endurance and safety to discharge home with [facility] OT and a follow up treatment frequency of 2-3x/wk. Please see assessment section for further patient specific details. If patient discharges prior to next session this note will serve as a discharge summary. Please see below for the latest assessment towards goals. Patient Diagnosis(es): The primary encounter diagnosis was Acute on chronic combined systolic and diastolic congestive heart failure ([address] Diagnoses of Goals of care, counseling/discussion, HOLLI (acute kidney injury) ([address] Pipe smoker, Constipation, unspecified constipation type, Elevated troponin, and Pleural effusion were also pertinent to this visit. Past Medical History:  has a past medical history of Cancer ([address] CHF (congestive heart failure) ([address] and TIA (transient ischemic attack). Past Surgical History:  has no past surgical history on file. Treatment Diagnosis: Decreased: ADLs, functional transfers/mobility      Assessment   Performance deficits / Impairments: Decreased functional mobility ; Decreased ADL status; Decreased balance;Decreased high-level IADLs  Assessment: Pt is a 79 yo M admitted with SOB, abdominal pain. PTA, pt lives alone in a house where he is typically independent in self-care, functional mobility, and has assist for homemaking. Pt remains just slightly below baseline, but able to complete functional transfers and mobility SBA using RW. Noted occasional episodes of \"freezing\" which pt reports happens at baseline - no LOB and corrects quickly when cued. Would recommend 1-2 days of 24/7 supervision and [facility] OT L1 to increase pt's safety and reduce fall risk. Treatment Diagnosis: Decreased: ADLs, functional transfers/mobility  Prognosis: Good  History: see above  REQUIRES OT FOLLOW-UP: Yes  Activity Tolerance  Activity Tolerance: Patient Tolerated treatment well        Plan   Occupational Therapy Plan  Times Per Week: 3-5  Times Per Day: Once a day  Current Treatment Recommendations: Strengthening, ROM, Balance training, Functional mobility training, Endurance training, Self-Care / ADL, Safety education & training     Restrictions  Restrictions/Precautions  Restrictions/Precautions: Fall Risk    Subjective   General  Chart Reviewed: Yes  Patient assessed for rehabilitation services?: Yes  Additional Pertinent Hx: per H&P: \"The patient Lonnie White is a 80 y.o.male with medical history significant for CHF TIA DVT pulmonary embolism and chronic anticoagulation with Eliquis. Patient also has hypertension and CKD. Patient presented to the emergency room with 2 weeks of for worsening shortness of breath. Patient also reports dry cough. \"  Family / Caregiver Present: No  Referring Practitioner: Yahir  Diagnosis: Pulmonary Edema  Subjective  Subjective: Pt met b/s for OT. Pt in bed, pleasant and agreeable.  Denied pain  General Comment  Comments: Per RN ok to see     Social/Functional History  Social/Functional History  Lives With: Alone  Type of Home: House  Home Layout: One level, Laundry in basement (stair lift to basement)  Home Access: Stairs to enter with rails  Entrance Stairs - Number of Steps: 1 thru front; 2 with rail thru garage  Bathroom Shower/Tub: Walk-in shower  Bathroom Toilet: Standard  Bathroom Equipment: Grab bars in shower, Grab bars around toilet  ADL Assistance: [address]: Needs assistance (children do shopping; cleaning service)  Ambulation Assistance: Independent  Transfer Assistance: Independent  Active : No  Occupation: Retired  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 Pt completed functional mobility ~300 ft in hallway SBA using RW, no LOB. Pt has occasional episode where he appears to \"freeze\" and take very short, shuffling steps but corrects this quickly when cued.  Pt reports he has noticed this in the past     Cognition  Overall Cognitive Status: WFL  Orientation  Overall Orientation Status: Within Functional Limits                  Education Given To: Patient  Education Provided: Role of Therapy;Transfer Training  Education Method: Verbal;Demonstration  Barriers to Learning: None  Education Outcome: Verbalized understanding;Continued education needed        AM-PAC Score  AM-PAC Inpatient Daily Activity Raw Score: 20 (12/20/22 1541)  AM-PAC Inpatient ADL T-Scale Score : 42.03 (12/20/22 1541)  ADL Inpatient CMS 0-100% Score: 38.32 (12/20/22 1541)  ADL Inpatient CMS G-Code Modifier : Alex Deng (12/20/22 1541)      Goals  Short Term Goals  Time Frame for Short Term Goals: Prior to d/c; goals ongoing  Short Term Goal 1: Pt will complete ADL transfers mod I  Short Term Goal 2: Pt will toilet mod I  Short Term Goal 3: Pt will groom in stance at sink mod I  Short Term Goal 4: Pt will bathe w/ supervision  Long Term Goals  Time Frame for Long Term Goals : LTG=STG  Patient Goals   Patient goals : to go home       Therapy Time   Individual Concurrent Group Co-treatment   Time In [address]         Time Out 1535         Minutes [address] [place], [place] [address]

## 2022-12-20 NOTE — CONSULTS
[facility]  HEART FAILURE PROGRAM      NAME:  Alona Calixto  MEDICAL RECORD NUMBER:  [id number]  AGE: 80 y.o.    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    CHF (congestive heart failure) (HCC)     TIA (transient ischemic attack)        SOCIAL HISTORY    Social History     Tobacco Use    Smoking status: Every Day     Types: Pipe   Substance Use Topics    Alcohol use: No    Drug use: No       ALLERGIES    No Known Allergies    MEDICATIONS  Scheduled Meds:   metoprolol succinate  12.5 mg Oral Daily    furosemide  20 mg IntraVENous BID    polyethylene glycol  17 g Oral Once    allopurinol  200 mg Oral Daily    Vitamin D  2,000 Units Oral Daily    tamsulosin  0.4 mg Oral Nightly    sodium chloride flush  5-40 mL IntraVENous 2 times per day    famotidine  20 mg Oral Nightly    apixaban  2.5 mg Oral BID       ADMIT DATE: 2022      Objective:     ADMISSION DIAGNOSIS:   SOB (shortness of breath) [R06.02]  Elevated troponin [R77.8]  HOLLI (acute kidney injury) ([facility] [address] [N17.9]  Goals of care, counseling/discussion [Z71.89]  Acute on chronic combined systolic and diastolic congestive heart failure ([facility] [address] [I50.43]  Pipe smoker [F17.290]  Constipation, unspecified constipation type [K59.00]     /69   Pulse 96   Temp 97.4 °F (36.3 °C) (Oral)   Resp 16   Ht 5' 10\" (1.778 m)   Wt 141 lb 12.1 oz (64.3 kg)   SpO2 95%   BMI 20.34 kg/m²     ADMIT:  Weight: 152 lb 12.5 oz (69.3 kg)    TODAY: Weight: 141 lb 12.1 oz (64.3 kg)    Wt Readings from Last 10 Encounters:   22 141 lb 12.1 oz (64.3 kg)   21 130 lb 8.2 oz (59.2 kg)          Intake/Output Summary (Last 24 hours) at 2022 1046  Last data filed at 2022 2128  Gross per 24 hour   Intake 240 ml   Output 2000 ml   Net -1760 ml       LABS  BMP:   Lab Results   Component Value Date/Time     12/20/2022 07:43 AM    K 4.0 12/20/2022 07:43 AM    K 4.9 04/28/2021 07:32 PM     12/20/2022 07:43 AM    CO2 22 12/20/2022 07:43 AM    BUN 57 12/20/2022 07:43 AM    LABALBU 3.9 12/17/2022 01:03 PM    CREATININE 2.2 12/20/2022 07:43 AM    CALCIUM 10.6 12/20/2022 07:43 AM    GFRAA 46 04/30/2021 06:34 AM    GFRAA 50 09/03/2011 07:00 PM    LABGLOM 27 12/20/2022 07:43 AM    GLUCOSE 98 12/20/2022 07:43 AM     CBC:   Recent Labs     12/17/22  1303 12/18/22  1106   WBC 12.3* 9.9   HGB 13.4* 13.4*   HCT 41.4 41.3   MCV 99.2 98.7    194     BNP: No results found for: BNP    ECHOCARDIOGRAM:     Summary   Mild left ventricle dilation. Mildly thickened posterior wall. Ejection fraction is visually estimated to be 15 %. Severe global   hypokinesis. Grade II diastolic dysfunction with elevated LV filling pressures. The left atrium is severely dilated. The right ventricle is severely enlarged. Mid to distal segments of the   right ventricle are hypokinetic. Small pericardial effusion. There is pleural effusion. Signature      ------------------------------------------------------------------   Electronically signed by Dora Colmenares MD   (Interpreting physician) on 12/19/2022 at 04:22 PM   ------------------------------------------------------------------       Assessment:     CONSULTS:   IP CONSULT TO HOSPITALIST  IP CONSULT TO HEART FAILURE NURSE/COORDINATOR  IP CONSULT TO DIETITIAN  IP CONSULT TO UROLOGY  IP CONSULT TO NEPHROLOGY  IP CONSULT TO CARDIOLOGY  IP CONSULT TO Shar[address]    Patient has a CARDIOLOGY CONSULT: Yes      Patient taking an ACEI/ARB:  No: ckd       Patient taking a BETA BLOCKER:  Yes    SCALE AVAILABLE:  Yes     EDUCATION STATUS: Patient   [x]  Provided both written and verbal education on Heart Failure signs/symptoms. [x]  Provided instructions on daily medications.    [x]  Provided instructions to monitor and record weight daily. [x]  Provided instructions to call if weight increases 3 lbs in one day or 5 lbs in one week. [x]  Received verbal acknowledgment/understanding of Heart Failure related causes. [x]  Provided instructions on how to maintain a low sodium diet. [x]  Provided recommendations for smoking cessation programs  [x]  Provided recommendations on activity and exercise    []  Other:  Mr. Sujatha Galindo came in through the ED with c/o sob, worsening over several weeks. He has a long history of systolic heart failure. He had a thoracentesis on 12/19/22  which removed 1000 ml. I found Mr. Sujatha Galindo awake, alert, oriented, on room air. I introduced myself and my role in heart failure education. He normally follows along with [email or web address]. He is knowledgeable about his disease. He pointed out that while he was having shortness of breath with some chest pain, his weight had not gone up. He is feeling much better now, and is hoping to go home soon. Mr. Sujatha Galindo is very independent at home. He just retired a few months ago. He has grown sons who are very supportive, one is a , who comes and prepares a months worth of low sodium meals, one is a pharmacist so he did not have questions regarding his medications. He weighs himself daily. He has a copy of the \"zones of Heart Failure\" that he looked at before deciding he needed to come in to the hospital.  He normally does not have many problems with his chf.  We talked about his weak heart. He said he has had this for a few years, and feels like he has a good handle on this. We did review his fluid restriction. He will aim to keep it at the 48-64 ounce limit. I reminded him that all fluids count--not just water. He does still smoke a tobacco pipe. He does not inhale. He does not plan on stopping now. He understands he will have a follow up appointment arranged for him at the time of discharge. CURRENT DIET: ADULT DIET; Regular;  Low Sodium (2 gm); 1200 ml    EDUCATIONAL PACKETS PROVIDED- Zones of heart Failure, daily weight log  Titles and material given:  Yes   [x]  What is Heart Failure? [x]  Heart Failure: Warning Signs of a Flare-Up  [x]  Heart Failure: Making Changes to Your Diet  [x]  Heart Failure: Medications to Help Your Heart   []  Other:     PATIENT/CAREGIVER TEACHING:    Level of patient/caregiver understanding able to:   [x] Verbalize understanding   [x] Demonstrate understanding       [x] Teach back        [] Needs reinforcement     []  Other:      TEACHING TIME:  20 minutes       Plan:       DISCHARGE PLAN:  Placement for patient upon discharge: home with support   Hospice Care:  no  Code Status: DNR-CCA  Discharge appointment scheduled: Yes     RECOMMENDATIONS:   [x]  Encourage to call Heart Failure Resource Line with any questions or concerns. [x]  Educate further Mr. Abebe Avery on fluid restriction 48 oz- 64 oz during inpatient stay so he can understand how to measure intake at home. [x]  Continue to educate on S/S of Heart Failure. [x]  Emphasize daily weights, diet, and if changes, to call Heart Failure Resource Line  [x]  Other:  [facility]/ Cardiac Rehab information given, referrals declined at this time.           Electronically signed by John Ruiz RN, BSN  on 12/20/2022 at 10:46 AM

## 2022-12-20 NOTE — PROGRESS NOTES
Hospitalist Progress Note      PCP: Raffy Post    Chief Complaint. Presented to hospital for SOB    Date of Admission: 12/17/2022    Subjective:   seen at bedside, he is on room air, sitting in chair, denies chest pain, nausea, vomiting, shortness of breath, fever or chills. mention feels overall better    Medications:  Reviewed    Infusion Medications    sodium chloride       Scheduled Medications    torsemide  10 mg Oral BID    metoprolol succinate  12.5 mg Oral Daily    polyethylene glycol  17 g Oral Once    allopurinol  200 mg Oral Daily    Vitamin D  2,000 Units Oral Daily    tamsulosin  0.4 mg Oral Nightly    sodium chloride flush  5-40 mL IntraVENous 2 times per day    famotidine  20 mg Oral Nightly    apixaban  2.5 mg Oral BID     PRN Meds: ipratropium-albuterol, sodium chloride flush, sodium chloride, ondansetron **OR** ondansetron, polyethylene glycol, acetaminophen **OR** acetaminophen      Intake/Output Summary (Last 24 hours) at 12/20/2022 1803  Last data filed at 12/19/2022 2128  Gross per 24 hour   Intake 240 ml   Output 400 ml   Net -160 ml         Physical Exam Performed:    /74   Pulse 87   Temp 97.5 °F (36.4 °C) (Oral)   Resp 14   Ht 5' 10\" (1.778 m)   Wt 141 lb 12.1 oz (64.3 kg)   SpO2 97%   BMI 20.34 kg/m²     General appearance: No apparent distress, on room air, sitting in chair  HEENT:  Conjunctivae/corneas clear. Neck: Supple, with full range of motion. Respiratory:  Normal respiratory effort. Clear to auscultation, bilaterally without Rales/Wheezes/Rhonchi. Cardiovascular: Regular rate and rhythm with normal S1/S2 without murmurs or rubs  Abdomen: Soft, non-tender, non-distended, normal bowel sounds. Musculoskeletal: No cyanosis or edema bilaterally  Neurologic:  without any focal sensory/motor deficits.  grossly non-focal.  Psychiatric: Alert and oriented, Normal mood  Peripheral Pulses: +2 palpable, equal bilaterally       Labs:   Recent Labs 12/18/22  1106   WBC 9.9   HGB 13.4*   HCT 41.3          Recent Labs     12/18/22  1106 12/19/22  0811 12/20/22  0743    140 140   K 4.0 4.2 4.0    104 105   CO2 19* 23 22   BUN 58* 57* 57*   CREATININE 2.3* 2.3* 2.2*   CALCIUM 10.5 10.6 10.6       No results for input(s): AST, ALT, BILIDIR, BILITOT, ALKPHOS in the last 72 hours. Recent Labs     12/18/22  1415   INR 1.76*       Recent Labs     12/19/22  0008 12/19/22  0811 12/19/22  1105   TROPONINI 0.43* 0.42* 0.43*         Urinalysis:      Lab Results   Component Value Date/Time    NITRU Negative 12/18/2022 02:00 AM    WBCUA 4 12/18/2022 02:00 AM    BACTERIA Rare 12/18/2022 02:00 AM    RBCUA 186 12/18/2022 02:00 AM    BLOODU LARGE 12/18/2022 02:00 AM    SPECGRAV 1.008 12/18/2022 02:00 AM    GLUCOSEU Negative 12/18/2022 02:00 AM    GLUCOSEU NEGATIVE 09/03/2011 07:10 PM       Radiology:  XR CHEST PORTABLE   Final Result   Status post right-sided thoracentesis with interval resolution of the pleural   effusion and resolving right basilar opacity      Stable cardiomegaly with resolving central pulmonary congestion and   decreasing left basilar opacity. US THORACENTESIS Which side should the procedure be performed? Right   Final Result   Successful ultrasound guided right thoracentesis. US RENAL COMPLETE   Final Result   No hydronephrosis noted. Left renal cysts      Mild prostatomegaly         XR CHEST 1 VIEW   Final Result   No evidence of pneumothorax. Decreased extent of right pleural effusion with associated atelectasis. XR CHEST (2 VW)   Final Result   Mild pulmonary edema with moderate right pleural effusion.                Assessment/Plan:    Active Hospital Problems    Diagnosis     Demand ischemia ([facility] [address] [I24.8]      Priority: Medium    Acute kidney injury superimposed on CKD ([facility] [address] [N17.9, N18.9]      Priority: Medium    SOB (shortness of breath) [R06.02]      Priority: Medium    Acute on chronic systolic heart failure (HCC) [I50.23]      Pulmonary edema and right-sided pleural effusion  Acute on chronic CHF  Start patient on Lasix 20 mg IV twice daily  Patient received 80 mg IV Lasix in the emergency room  He f/u with his cardiologist at [facility]    Anticoagulation  Continue on Eliquis     Hypertension  Continue on home medication  Imdur metoprolol and valsartan on     Continue other home medication  That includes allopurinol aspirin      Urinary symptoms  Patient reports urgency and frequency  Continue on Flomax  Check pre and postvoid residuals    DVT Prophylaxis: on eliquis  Diet: ADULT DIET; Regular; Low Sodium (2 gm); 1200 ml  Code Status: DNR-CCA    PT/OT Eval Status: ordered    Dispo/Plan of care -   Plan for thoracentesis today, diagnostic studies ordered - transudate, no infection. Cardiology consulted. Cr worsening consult nephrology  Monitor BMP  Echo - Ejection fraction is visually estimated to be 15 %. Severe global hypokinesis.    Grade II diastolic dysfunction with elevated LV filling pressures  DC IV lasix, switched to torsemide      Tirso Goddard MD

## 2022-12-20 NOTE — PROGRESS NOTES
Referring Physician: Dr. Wilmar Bundy  Reason for Consultation: \"Elevated troponin, elevated proBNP, CHF\"  Chief Complaint: Short of breath    Subjective:   History of Present Illness:  Claudean Glory is a 80 y.o. patient who presented to the hospital with complaints of shortness of breath. The patient chronically follows with [facility] cardiology for CHF. The shortness of breath was worsening over several weeks. He denied associated chest pains. He denied weight gain or lower extremity edema. He was having a new cough but denies fevers, chills, or mucus production. He underwent thoracentesis today removing 1 L of fluid. He feels the thoracentesis greatly helped. The patient was also experiencing urinary retention and had a Chiang catheter placed but it has already been removed. Interval history:  No acute overnight cardiac events. Patient endorses his wishes for conservative therapies/symptom management only. He feels his shortness of breath is significantly improved from the time of admission particularly after thoracentesis. He denies associated chest pains. He denies PND or orthopnea. Past Medical History:   has a past medical history of Cancer ([address] CHF (congestive heart failure) ([address] and TIA (transient ischemic attack). Surgical History:  Denies prior cardiac surgery. Social History:   reports that he has been smoking pipe. He does not have any smokeless tobacco history on file. He reports that he does not drink alcohol and does not use drugs. Family History:  Denies premature coronary atherosclerosis. Home Medications:  Were reviewed and are listed in nursing record and/or below  Prior to Admission medications    Medication Sig Start Date End Date Taking?  Authorizing Provider   furosemide (LASIX) 40 MG tablet Take 1 tablet by mouth daily  Patient taking differently: Take 20 mg by mouth daily 5/1/21   Lacey Boswell MD   apixaban (ELIQUIS) 2.5 MG TABS tablet Take 2.5 mg by mouth 2 times daily    Historical Provider, MD   metoprolol succinate (TOPROL XL) 25 MG extended release tablet Take 12.5 mg by mouth daily    Historical Provider, MD   tamsulosin (FLOMAX) 0.4 MG capsule Take 0.4 mg by mouth nightly    Historical Provider, MD   valsartan (DIOVAN) 40 MG tablet Take 40 mg by mouth daily    Historical Provider, MD   aspirin 81 MG chewable tablet Take 81 mg by mouth daily    Historical Provider, MD   allopurinol (ZYLOPRIM) 100 MG tablet Take 200 mg by mouth daily    Historical Provider, MD        CURRENT Medications:  metoprolol succinate (TOPROL XL) extended release tablet 12.5 mg, Daily  furosemide (LASIX) injection 20 mg, BID  ipratropium-albuterol (DUONEB) nebulizer solution 1 ampule, Q4H PRN  polyethylene glycol (GLYCOLAX) packet 17 g, Once  allopurinol (ZYLOPRIM) tablet 200 mg, Daily  Vitamin D (CHOLECALCIFEROL) tablet 2,000 Units, Daily  tamsulosin (FLOMAX) capsule 0.4 mg, Nightly  sodium chloride flush 0.9 % injection 5-40 mL, 2 times per day  sodium chloride flush 0.9 % injection 5-40 mL, PRN  0.9 % sodium chloride infusion, PRN  famotidine (PEPCID) tablet 20 mg, Nightly  ondansetron (ZOFRAN-ODT) disintegrating tablet 4 mg, Q8H PRN   Or  ondansetron (ZOFRAN) injection 4 mg, Q6H PRN  polyethylene glycol (GLYCOLAX) packet 17 g, Daily PRN  acetaminophen (TYLENOL) tablet 650 mg, Q6H PRN   Or  acetaminophen (TYLENOL) suppository 650 mg, Q6H PRN  apixaban (ELIQUIS) tablet 2.5 mg, BID  Allergies:  Patient has no known allergies. Review of Systems:   Constitutional: no unanticipated weight loss. There's been no change in energy level, sleep pattern, or activity level. No fevers, chills. Eyes: No visual changes or diplopia. No scleral icterus. ENT: No Headaches, hearing loss or vertigo. No mouth sores or sore throat. Cardiovascular: as reviewed in HPI  Respiratory: + cough. No wheezing, no sputum production. No hemoptysis.     Gastrointestinal: No abdominal pain, appetite loss, blood in stools. No change in bowel or bladder habits. Genitourinary: No dysuria, trouble voiding, or hematuria. Musculoskeletal:  No gait disturbance, no joint complaints. Integumentary: No rash or pruritis. Neurological: No headache, diplopia, change in muscle strength, numbness or tingling. Psychiatric: No anxiety or depression. Endocrine: No temperature intolerance. No excessive thirst, fluid intake, or urination. No tremor. Hematologic/Lymphatic: No abnormal bruising or bleeding, blood clots or swollen lymph nodes. Allergic/Immunologic: No nasal congestion or hives. Objective:   PHYSICAL EXAM:    Vitals:    12/20/22 1103   BP: (!) 100/51   Pulse: 61   Resp:    Temp: 97.8 °F (36.6 °C)   SpO2:     Weight: 141 lb 12.1 oz (64.3 kg)       General Appearance:  Alert, cooperative, no distress, elderly. Head:  Normocephalic, without obvious abnormality, atraumatic. Eyes:  Pupils equal and round. No scleral icterus. Mouth: Moist mucosa, no pharyngeal erythema. Nose: Nares normal. No drainage or sinus tenderness. Neck: Supple, symmetrical, trachea midline. No adenopathy. No tenderness/mass/nodules. No carotid bruit or elevated JVD. Lungs:   Respirations unlabored. No crackles noted. Diminished breath sounds bilateral bases. Chest Wall:  No tenderness or deformity. Heart:  Regular rate. S1/S2 normal. No murmur, rub, or gallop. Abdomen:   Soft, non-tender, bowel sounds active. Musculoskeletal: No muscle wasting or digital clubbing. Extremities: Extremities normal, atraumatic. No cyanosis or edema. Pulses: 2+ radial and carotid pulses, symmetric. Skin: No rashes or lesions. Pysch: Normal mood and affect. Alert and oriented x 4.    Neurologic: Normal gross motor and sensory exam.       Labs     CBC:   Lab Results   Component Value Date/Time    WBC 9.9 12/18/2022 11:06 AM    RBC 4.18 12/18/2022 11:06 AM    HGB 13.4 12/18/2022 11:06 AM    HCT 41.3 12/18/2022 11:06 AM    MCV 98.7 12/18/2022 11:06 AM    RDW 15.3 12/18/2022 11:06 AM     12/18/2022 11:06 AM     CMP:  Lab Results   Component Value Date/Time     12/20/2022 07:43 AM    K 4.0 12/20/2022 07:43 AM    K 4.9 04/28/2021 07:32 PM     12/20/2022 07:43 AM    CO2 22 12/20/2022 07:43 AM    BUN 57 12/20/2022 07:43 AM    CREATININE 2.2 12/20/2022 07:43 AM    GFRAA 46 04/30/2021 06:34 AM    GFRAA 50 09/03/2011 07:00 PM    AGRATIO 1.2 12/17/2022 01:03 PM    LABGLOM 27 12/20/2022 07:43 AM    GLUCOSE 98 12/20/2022 07:43 AM    PROT 7.1 12/17/2022 01:03 PM    PROT 7.4 09/03/2011 07:00 PM    CALCIUM 10.6 12/20/2022 07:43 AM    BILITOT 0.7 12/17/2022 01:03 PM    ALKPHOS 76 12/17/2022 01:03 PM    AST 26 12/17/2022 01:03 PM    ALT 24 12/17/2022 01:03 PM     PT/INR:  No results found for: PTINR  HgBA1c:No results found for: LABA1C  Lab Results   Component Value Date    TROPONINI 0.43 (H) 12/19/2022       Cardiac Data     EKG: Personally interpreted. 12/17/2022. Sinus rhythm with PVC. Left anterior fascicular block. Right bundle branch block. [facility]: [facility]/21/[facility] Overall left ventricular ejection fraction is estimated to be 20-25%. Regional wall motion abnormalities are present. The left ventricular apex is hypokinetic. LV thrombus can not be excluded. There is mild eccentric left ventricular hypertrophy. Mildly dilated aortic root & ascending aorta. The aortic valve is trileaflet. No hemodynamically significant valvular aortic stenosis or regurgitation. The diastolic function is impaired and classified as Grade 1 (impaired relaxation). Right ventricular systolic pressure is normal.   The IVC size is normal.     Echo: 12/19/22  Mild left ventricle dilation. Mildly thickened posterior wall. Ejection fraction is visually estimated to be 15 %. Severe global hypokinesis. Grade II diastolic dysfunction with elevated LV filling pressures. The left atrium is severely dilated. The right ventricle is severely enlarged.  Mid to distal segments of the right ventricle are hypokinetic. Small pericardial effusion. There is pleural effusion. Telemetry: Personally interpreted. Sinus. Assessment and Plan   1) Acute on chronic systolic heart failure.  EF <20%. Goals of treatment should be conservative/symptom based. No ACE-I/ARB/Aldactone/SGLT2i with severity of CKD. Continue beta-blocker. Nephrology transitioned diuretics to oral torsemide. Not an ICD candidate secondary to advanced age. 2) Demand ischemia. History is not consistent with acute coronary syndrome. Troponin trend is relatively flat. Likely chronically elevated secondary to CHF and CKD. No plans for stress testing or angiography with advanced age and CKD. 3) CKD stage IIIb-IV. Nephrology following. Patient also had issues with urinary retention during hospitalization. Chiang catheter has been removed. Will defer additional treatment to nephrology and primary team.    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 Overall, the problems requiring hospitalization are high in severity. Will sign off. Call with questions. Patient should follow-up with his primary cardiologist upon discharge. Thank you for allowing us to participate in the care of Tina Echavarria. Braden Johnson.  Vika Simmons, [address] [place] [address] Road  12/20/2022 10:33 AM

## 2022-12-20 NOTE — PLAN OF CARE
Problem: Discharge Planning  Goal: Discharge to home or other facility with appropriate resources  12/20/2022 1029 by Silvino Anderson RN  Outcome: Progressing  12/20/2022 0531 by Pankaj Francis RN  Outcome: Progressing     Problem: Skin/Tissue Integrity  Goal: Absence of new skin breakdown  Description: 1. Monitor for areas of redness and/or skin breakdown  2. Assess vascular access sites hourly  3. Every 4-6 hours minimum:  Change oxygen saturation probe site  4. Every 4-6 hours:  If on nasal continuous positive airway pressure, respiratory therapy assess nares and determine need for appliance change or resting period.   12/20/2022 1029 by Silvino Anderson RN  Outcome: Progressing  12/20/2022 0531 by Pankaj Francis RN  Outcome: Progressing     Problem: Chronic Conditions and Co-morbidities  Goal: Patient's chronic conditions and co-morbidity symptoms are monitored and maintained or improved  12/20/2022 1029 by Silvino Anderson RN  Outcome: Progressing  12/20/2022 0531 by Pankaj Francis RN  Outcome: Progressing     Problem: Pain  Goal: Verbalizes/displays adequate comfort level or baseline comfort level  12/20/2022 1029 by Silvino Anderson RN  Outcome: Progressing  12/20/2022 0531 by Pankaj Francis RN  Outcome: Progressing     Problem: ABCDS Injury Assessment  Goal: Absence of physical injury  12/20/2022 1029 by Silvino Anderson RN  Outcome: Progressing  12/20/2022 0531 by Pankaj Francis RN  Outcome: Progressing

## 2022-12-21 VITALS
HEIGHT: 70 IN | WEIGHT: 141.31 LBS | TEMPERATURE: 97.3 F | HEART RATE: 97 BPM | DIASTOLIC BLOOD PRESSURE: 52 MMHG | BODY MASS INDEX: 20.23 KG/M2 | RESPIRATION RATE: 18 BRPM | OXYGEN SATURATION: 96 % | SYSTOLIC BLOOD PRESSURE: 105 MMHG

## 2022-12-21 LAB
ANION GAP SERPL CALCULATED.3IONS-SCNC: 16 MMOL/L (ref 3–16)
BUN BLDV-MCNC: 55 MG/DL (ref 7–20)
CALCIUM SERPL-MCNC: 10.4 MG/DL (ref 8.3–10.6)
CHLORIDE BLD-SCNC: 104 MMOL/L (ref 99–110)
CO2: 19 MMOL/L (ref 21–32)
CREAT SERPL-MCNC: 2.1 MG/DL (ref 0.8–1.3)
GFR SERPL CREATININE-BSD FRML MDRD: 29 ML/MIN/{1.73_M2}
GLUCOSE BLD-MCNC: 100 MG/DL (ref 70–99)
MAGNESIUM: 2.1 MG/DL (ref 1.8–2.4)
POTASSIUM SERPL-SCNC: 3.9 MMOL/L (ref 3.5–5.1)
SODIUM BLD-SCNC: 139 MMOL/L (ref 136–145)

## 2022-12-21 PROCEDURE — 83735 ASSAY OF MAGNESIUM: CPT

## 2022-12-21 PROCEDURE — 6370000000 HC RX 637 (ALT 250 FOR IP): Performed by: HOSPITALIST

## 2022-12-21 PROCEDURE — 6370000000 HC RX 637 (ALT 250 FOR IP): Performed by: INTERNAL MEDICINE

## 2022-12-21 PROCEDURE — 36415 COLL VENOUS BLD VENIPUNCTURE: CPT

## 2022-12-21 PROCEDURE — 97530 THERAPEUTIC ACTIVITIES: CPT

## 2022-12-21 PROCEDURE — 2580000003 HC RX 258: Performed by: HOSPITALIST

## 2022-12-21 PROCEDURE — 97535 SELF CARE MNGMENT TRAINING: CPT

## 2022-12-21 PROCEDURE — 80048 BASIC METABOLIC PNL TOTAL CA: CPT

## 2022-12-21 PROCEDURE — 94760 N-INVAS EAR/PLS OXIMETRY 1: CPT

## 2022-12-21 RX ADMIN — Medication 2.5 MG: at 09:11

## 2022-12-21 RX ADMIN — SODIUM CHLORIDE, PRESERVATIVE FREE 10 ML: 5 INJECTION INTRAVENOUS at 09:12

## 2022-12-21 RX ADMIN — TORSEMIDE 10 MG: 20 TABLET ORAL at 09:11

## 2022-12-21 RX ADMIN — ALLOPURINOL 200 MG: 100 TABLET ORAL at 09:11

## 2022-12-21 RX ADMIN — Medication 2000 UNITS: at 09:13

## 2022-12-21 ASSESSMENT — PAIN SCALES - GENERAL
PAINLEVEL_OUTOF10: 0
PAINLEVEL_OUTOF10: 0

## 2022-12-21 NOTE — PROGRESS NOTES
Physical Therapy  Facility/Department: [address] MED SURG  Physical Therapy Treatment Note    Name: Juana Mills  : 1930  MRN: [id number]  Date of Service: 2022    Discharge Recommendations:  Therapy recommended at discharge, 24 hour supervision or assist, Home with Home health PT   PT Equipment Recommendations  Equipment Needed: Yes  Abigail Pride: Jonathan      Patient Diagnosis(es): The primary encounter diagnosis was Acute on chronic combined systolic and diastolic congestive heart failure ([address] Diagnoses of Goals of care, counseling/discussion, HOLLI (acute kidney injury) ([address] Pipe smoker, Constipation, unspecified constipation type, Elevated troponin, and Pleural effusion were also pertinent to this visit. Past Medical History:  has a past medical history of Cancer ([address] CHF (congestive heart failure) ([address] and TIA (transient ischemic attack). Past Surgical History:  has no past surgical history on file. Assessment   Body Structures, Functions, Activity Limitations Requiring Skilled Therapeutic Intervention: Decreased functional mobility ; Decreased strength;Decreased safe awareness;Decreased balance;Decreased endurance  Assessment: Patient presented to the emergency room on 22 with 2 weeks of for worsening shortness of breath. Prior to admission, pt living alone, independent with ADLs and ambulation without device. Pt currently functioning below baseline - noted he has Parkinson's like shuffling with gait initiation. Anticipate return home with initial 24hr supv and level 3 HHPT. Recommend RW at discharge.   Treatment Diagnosis: impaired mobility  Therapy Prognosis: Good  Decision Making: Medium Complexity  History: see above  Exam: see below  Clinical Presentation: see above  Barriers to Learning: evolving  Activity Tolerance  Activity Tolerance: Patient tolerated treatment well     Plan   Physcial Therapy Plan  General Plan: 3-5 times per week  Current Treatment Recommendations: Strengthening, Balance training, Functional mobility training, Transfer training, Gait training, Endurance training, Neuromuscular re-education, Safety education & training, Equipment evaluation, education, & procurement, Patient/Caregiver education & training, Therapeutic activities  Safety Devices  Type of Devices: All fall risk precautions in place, Call light within reach, Gait belt, Patient at risk for falls, Left in chair, Chair alarm in place     Restrictions  Restrictions/Precautions  Restrictions/Precautions: Fall Risk     Subjective   General  Chart Reviewed: Yes  Patient assessed for rehabilitation services?: Yes  Additional Pertinent Hx: Patient presented to the emergency room on 12/17/22 with 2 weeks of for worsening shortness of breath. Response To Previous Treatment: Not applicable  Family / Caregiver Present: No  Referring Practitioner: Marlon Bowles MD  Referral Date : 12/18/22  Diagnosis: Pulmonary edema  Follows Commands: Within Functional Limits  Subjective  Subjective: Entered room to bed alarm going off - alarm cord not connected to the bed or to the wall. Pt is agreeable to PT - needs to go to the bathroom.          Social/Functional History  Social/Functional History  Lives With: Alone  Type of Home: House  Home Layout: One level, Laundry in basement (stair lift to basement)  Home Access: Stairs to enter with rails  Entrance Stairs - Number of Steps: 1 thru front; 2 with rail thru garage  Bathroom Shower/Tub: Walk-in shower  Bathroom Toilet: Standard  Bathroom Equipment: Grab bars in shower, Grab bars around toilet  ADL Assistance: [address]: Needs assistance (children do shopping; cleaning service)  Ambulation Assistance: Independent  Transfer Assistance: Independent  Active : No  Occupation: Retired  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[employer] [place]    Vision/Hearing  Vision  Vision: Impaired  Vision Exceptions: Wears glasses at all times  Hearing  Hearing: Exceptions to WFL  Hearing Exceptions: Hard of hearing/hearing concerns        Objective   Bed mobility  Supine to Sit: Modified independent (not seen, but sitting EOB upon entering)  Sit to Supine: Unable to assess (in recliner at end of session)  Transfers  Sit to Stand: Stand by assistance  Stand to Sit: Stand by assistance  Ambulation  Surface: Level tile  Device: Rolling Walker  Assistance: Contact guard assistance  Gait Deviations: Slow Claire;Decreased step length;Decreased step height;Shuffles  Distance: 10' + 25'  Comments: Pt with freezing episodes upon initiating gait. Repeated cues for walker safety. Balance  Posture: Good  Sitting - Static: Good  Sitting - Dynamic: Good  Standing - Static: Fair (without device)  Standing - Dynamic: Fair; - (without device)           AM-PAC Score  AM-PAC Inpatient Mobility Raw Score : 20 (12/21/22 0902)  AM-PAC Inpatient T-Scale Score : 47.67 (12/21/22 0902)  Mobility Inpatient CMS 0-100% Score: 35.83 (12/21/22 0902)  Mobility Inpatient CMS G-Code Modifier : CJ (12/21/22 0902)        Goals  Short Term Goals  Time Frame for Short Term Goals: by acute discharge - all goals ongoing as of 12/21/22 unless noted otherwise  Short Term Goal 1: bed mobility with SBA - MET 12/21/22  Short Term Goal 2: sit<>stand with supv  Short Term Goal 3: ambulate > 48' mod I with RW  Patient Goals   Patient Goals : none stated       Education  Patient Education  Education Given To: Patient  Education Provided: Role of Therapy;Plan of Care  Education Method: Verbal  Barriers to Learning: None  Education Outcome: Verbalized understanding;Continued education needed      Therapy Time   Individual Concurrent Group Co-treatment   Time In 0830         Time Out 0840         Minutes 10         Timed Code Treatment Minutes: [address], PT

## 2022-12-21 NOTE — PROGRESS NOTES
Occupational Therapy  Facility/Department: [facility]  Occupational Therapy Daily Treatment    Name: Ramón Sweeney  : 1930  MRN: [id number]  Date of Service: 2022    Discharge Recommendations:  24 hour supervision or assist, Home with Home health OT, S Level 1, Continue to assess pending progress  OT Equipment Recommendations  ADL Assistive Devices: Shower Chair with back  Ramón Sweeney scored a 19/24 on the AM-PAC ADL Inpatient form. Current research shows that an AM-PAC score of 18 or greater is typically associated with a discharge to the patient's home setting. Based on the patient's AM-PAC score, and their current ADL deficits, it is recommended that the patient have 2-3 sessions per week of Occupational Therapy at d/c to increase the patient's independence. At this time, this patient demonstrates the endurance and safety to discharge home with [facility] OT and a follow up treatment frequency of 2-3x/wk. Please see assessment section for further patient specific details. If patient discharges prior to next session this note will serve as a discharge summary. Please see below for the latest assessment towards goals. Patient Diagnosis(es): The primary encounter diagnosis was Acute on chronic combined systolic and diastolic congestive heart failure ([address] Diagnoses of Goals of care, counseling/discussion, HOLLI (acute kidney injury) ([address] Pipe smoker, Constipation, unspecified constipation type, Elevated troponin, and Pleural effusion were also pertinent to this visit. Past Medical History:  has a past medical history of Cancer ([address] CHF (congestive heart failure) ([address] and TIA (transient ischemic attack). Past Surgical History:  has no past surgical history on file. Treatment Diagnosis: Decreased: ADLs, functional transfers/mobility      Assessment   Performance deficits / Impairments: Decreased functional mobility ; Decreased ADL status; Decreased balance;Decreased high-level IADLs  Assessment: Pt is a 81 yo M admitted with SOB, abdominal pain. PTA, pt lives alone in a house where he is typically independent in self-care, functional mobility, and has assist for homemaking. Pt remains slightly below baseline - did require min A for toileting d/t incontinence and mod A LB dressing. He completed functional transfers, mobility, and standing grooming SBA using RW. Continue to recommend 1-2 days of [facility] to increase pt's safety and reduce fall risk. Treatment Diagnosis: Decreased: ADLs, functional transfers/mobility  Prognosis: Good  History: see above  REQUIRES OT FOLLOW-UP: Yes  Activity Tolerance  Activity Tolerance: Patient Tolerated treatment well        Plan   Occupational Therapy Plan  Times Per Week: 3-5  Times Per Day: Once a day  Current Treatment Recommendations: Strengthening, ROM, Balance training, Functional mobility training, Endurance training, Self-Care / ADL, Safety education & training     Restrictions  Restrictions/Precautions  Restrictions/Precautions: Fall Risk    Subjective   General  Chart Reviewed: Yes  Patient assessed for rehabilitation services?: Yes  Additional Pertinent Hx: per H&P: \"The patient Farzana Palacio is a 80 y.o.male with medical history significant for CHF TIA DVT pulmonary embolism and chronic anticoagulation with Eliquis. Patient also has hypertension and CKD. Patient presented to the emergency room with 2 weeks of for worsening shortness of breath. Patient also reports dry cough. \"  Family / Caregiver Present: No  Referring Practitioner: Yahir  Diagnosis: Pulmonary Edema  Subjective  Subjective: Pt met b/s - bed alarm sounding and pt attempting to get up to the bathroom.  Agreeable to therapy and denied pain  General Comment  Comments: Per RN ok to see     Social/Functional History  Social/Functional History  Lives With: Alone  Type of Home: House  Home Layout: One level, Laundry in basement (stair lift to basement)  Home Access: Stairs to enter with rails  Entrance Stairs - Number of Steps: 1 thru front; 2 with rail thru garage  Bathroom Shower/Tub: Walk-in shower  Bathroom Toilet: Standard  Bathroom Equipment: Grab bars in shower, Grab bars around toilet  ADL Assistance: [address]: Needs assistance (children do shopping; cleaning service)  Ambulation Assistance: Independent  Transfer Assistance: Independent  Active : No  Occupation: Retired  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[employer]       Objective   Safety Devices  Type of Devices: All fall risk precautions in place;Call light within reach;Gait belt;Patient at risk for falls; Left in chair;Chair alarm in place           ADL  Grooming: Stand by assistance  Grooming Skilled Clinical Factors: Washed hands and face in stance at the sink  LE Dressing: Moderate assistance  LE Dressing Skilled Clinical Factors: Pt attempted to don new brief but required assist to thread over feet  Toileting: Minimal assistance  Toileting Skilled Clinical Factors: Pt incontinent of urine in briefs- required assist to doff doiled briefs. He then sat on commode to void urine and have BM.  He completed hygiene and managed briefs up w/ SBA        Bed mobility  Supine to Sit: Modified independent  Sit to Supine: Unable to assess (in recliner at end of session)  Transfers  Sit to stand: Stand by assistance  Stand to sit: Stand by assistance  Transfer Comments: RW. Pt completed functional mobility to/from  SBA using RW, no LOB noted  Vision  Vision: Impaired  Vision Exceptions: Wears glasses at all times  Hearing  Hearing: Exceptions to [facility]  Hearing Exceptions: Hard of hearing/hearing concerns  Cognition  Overall Cognitive Status: WFL  Orientation  Overall Orientation Status: Within Functional Limits                  Education Given To: Patient  Education Provided: Role of Therapy;Transfer Training;ADL Adaptive Strategies  Education Method: Verbal;Demonstration  Barriers to Learning: None  Education Outcome: Verbalized understanding;Continued education needed           AM-PAC Score  AM-PAC Inpatient Daily Activity Raw Score: 19 (12/21/22 0858)  AM-PAC Inpatient ADL T-Scale Score : 40.22 (12/21/22 0858)  ADL Inpatient CMS 0-100% Score: 42.8 (12/21/22 0858)  ADL Inpatient CMS G-Code Modifier : CK (12/21/22 0858)    Goals  Short Term Goals  Time Frame for Short Term Goals: Prior to d/c; goals ongoing  Short Term Goal 1: Pt will complete ADL transfers mod I  Short Term Goal 2: Pt will toilet mod I  Short Term Goal 3: Pt will groom in stance at sink mod I  Short Term Goal 4: Pt will bathe w/ supervision  Long Term Goals  Time Frame for Long Term Goals : LTG=STG  Patient Goals   Patient goals : to go home       Therapy Time   Individual Concurrent Group Co-treatment   Time In 0830         Time Out 0840         Minutes [address], OTR/L [id number]

## 2022-12-21 NOTE — NURSE NAVIGATOR
Discharge order noted. Pt has received 60 minutes of heart failure education suring this admission for acute on chronic systolic heart failure. He has follow up appointments scheduled with his PCP, and cardiologist, noted on AVS.  He has instructions on his AVS, as well as the [address]. All GDMT has been addressed.    Weight 141 lbs

## 2022-12-21 NOTE — PLAN OF CARE
Problem: Discharge Planning  Goal: Discharge to home or other facility with appropriate resources  12/21/2022 1247 by Sidney Espino RN  Outcome: Completed  Flowsheets (Taken 12/21/2022 0845)  Discharge to home or other facility with appropriate resources:   Identify barriers to discharge with patient and caregiver   Arrange for needed discharge resources and transportation as appropriate   Identify discharge learning needs (meds, wound care, etc)  12/21/2022 0046 by Jorge Watson RN  Outcome: Progressing     Problem: Skin/Tissue Integrity  Goal: Absence of new skin breakdown  Description: 1. Monitor for areas of redness and/or skin breakdown  2. Assess vascular access sites hourly  3. Every 4-6 hours minimum:  Change oxygen saturation probe site  4. Every 4-6 hours:  If on nasal continuous positive airway pressure, respiratory therapy assess nares and determine need for appliance change or resting period.   12/21/2022 1247 by Sidney Espino RN  Outcome: Completed  12/21/2022 0046 by Jorge Watson RN  Outcome: Progressing     Problem: Chronic Conditions and Co-morbidities  Goal: Patient's chronic conditions and co-morbidity symptoms are monitored and maintained or improved  12/21/2022 1247 by Sidney Espino RN  Outcome: Completed  Flowsheets (Taken 12/21/2022 0845)  Care Plan - Patient's Chronic Conditions and Co-Morbidity Symptoms are Monitored and Maintained or Improved:   Monitor and assess patient's chronic conditions and comorbid symptoms for stability, deterioration, or improvement   Collaborate with multidisciplinary team to address chronic and comorbid conditions and prevent exacerbation or deterioration   Update acute care plan with appropriate goals if chronic or comorbid symptoms are exacerbated and prevent overall improvement and discharge  12/21/2022 0046 by Jorge Watson RN  Outcome: [phone] (Taken 12/20/2022 2010)  Care Plan - Patient's Chronic Conditions and Co-Morbidity Symptoms are Monitored and Maintained or Improved: Monitor and assess patient's chronic conditions and comorbid symptoms for stability, deterioration, or improvement     Problem: Pain  Goal: Verbalizes/displays adequate comfort level or baseline comfort level  12/21/2022 1247 by Brandy Fitzpatrick RN  Outcome: Completed  12/21/2022 0046 by Giancarlo Hughes RN  Outcome: Progressing  Flowsheets (Taken 12/20/2022 2356)  Verbalizes/displays adequate comfort level or baseline comfort level: Encourage patient to monitor pain and request assistance     Problem: ABCDS Injury Assessment  Goal: Absence of physical injury  12/21/2022 1247 by Brandy Fitzpatrick RN  Outcome: Completed  Flowsheets (Taken 12/21/2022 1000)  Absence of Physical Injury: Implement safety measures based on patient assessment  12/21/2022 0046 by Giancarlo Hughes RN  Outcome: Progressing  Flowsheets (Taken 12/21/2022 0044)  Absence of Physical Injury: Implement safety measures based on patient assessment

## 2022-12-21 NOTE — CARE COORDINATION
12/21/22 1039   IMM Letter   IMM Letter given to Patient/Family/Significant other/Guardian/POA/by: Provided to patients son by TRAY Adame, MARCELO. Education provided to son, son reported no questions and verbalized understanding. Son aware of 4 hours allotted time to determine if they choose to pursue Medicare appeal process.    IMM Letter date given: 12/21/22   IMM Letter time given: [address]  [phone]  Electronically signed by Joyce Orellana on 12/21/2022 at 10:40 AM

## 2022-12-21 NOTE — PROGRESS NOTES
Nephrology ([facility]) Progress Note    CC: CKD3b    Subjective:    HPI:  Breathing comfortably. No CP. Tolerated thoracentesis. Renal function unchanged. ROS:  In bed. NO fever. [address]:  medications reviewed. Objective:  Blood pressure (!) 105/52, pulse 97, temperature 97.3 °F (36.3 °C), temperature source Oral, resp. rate 18, height 5' 10\" (1.778 m), weight 141 lb 5 oz (64.1 kg), SpO2 94 %. Intake/Output Summary (Last 24 hours) at 12/21/2022 1117  Last data filed at 12/21/2022 0900  Gross per 24 hour   Intake 360 ml   Output --   Net 360 ml       General:  NAD, A+Ox3  Chest:  CTAB  CVS:  RRR  Abdominal:  NTND, soft, +BS  Extremities:  no edema  Skin:  no rash    Labs:  Renal panel:  Lab Results   Component Value Date/Time     12/21/2022 07:46 AM    K 3.9 12/21/2022 07:46 AM    K 4.9 04/28/2021 07:32 PM    CO2 19 (L) 12/21/2022 07:46 AM    BUN 55 (H) 12/21/2022 07:46 AM    CREATININE 2.1 (H) 12/21/2022 07:46 AM    CALCIUM 10.4 12/21/2022 07:46 AM    MG 2.10 12/21/2022 07:46 AM     CBC:  Lab Results   Component Value Date/Time    WBC 9.9 12/18/2022 11:06 AM    HGB 13.4 (L) 12/18/2022 11:06 AM    HCT 41.3 12/18/2022 11:06 AM     12/18/2022 11:06 AM       Assessment/Plan:  Reviewed old records and labs.     1) CKD3b              - baseline 04/29/21 Cr 1.9                               08/05/22 Cr 1.85              - off valsartan   - renal function stable   - patient is not a candidate for dialysis due to age     2) ACSHF              - discussed concept of fluid balance              - fluid restriction 1 liter/day              - on torsemide 10 mg bid     3) urinary retention              - ortiz placed              - urology consulted   - on flomax     4) right pleural effusion              - s/p thoracentesis 12/20/22     5) tobacco abuse              - encouraged to quit     6) HTN > hypotension              - would like SBP > 100 to maintain renal perfusion pressure

## 2022-12-21 NOTE — PROGRESS NOTES
Pt transported home with son via w/c. Pt left facility with all personal belongings. No distress or discomfort noted upon discharge.

## 2022-12-21 NOTE — DISCHARGE SUMMARY
Hospital Medicine Discharge Summary    Patient ID: Evette Irizarry      Patient's PCP: Bryanna Abad Date: 12/17/2022     Discharge Date: 12/21/2022    Admitting Physician: Selin Barnhart MD     Discharge Physician: Niru Aldridge MD     Discharge Diagnoses: Active Hospital Problems    Diagnosis Date Noted    Demand ischemia ([facility] [I24.8] 12/19/2022     Priority: Medium    Acute kidney injury superimposed on CKD ([facility] [N17.9, N18.9] 12/19/2022     Priority: Medium    SOB (shortness of breath) [R06.02] 12/17/2022     Priority: Medium    Acute on chronic systolic heart failure ([facility] [I50.23] 04/28/2021       The patient was seen and examined on day of discharge and this discharge summary is in conjunction with any daily progress note from day of discharge. Condition at discharge - stable    Hospital Course: patient seen and evaluated on the day of discharge. Patient informed about following up with appointments. Patient verbalized understanding for follow-up appointments. The patient and / or the family were informed of the results of tests, a time was given to answer questions, a plan was proposed and they agreed with plan. Medical reconciliation performed. Patient discharged stable condition. On the date of discharge, the patient reported feeling stable. The patient was found to not be in any acute distress, with vital signs within normal limits, and no new abnormalities on physical examination. Further, the patient expressed appropriate understanding of, and agreement with, the discharge recommendations, medications, and plan.     Pulmonary edema and right-sided pleural effusion  Acute on chronic CHF  Start patient on Lasix 20 mg IV twice daily  Patient received 80 mg IV Lasix in the emergency room  He f/u with his cardiologist at [facility] [place]     Anticoagulation  Continue on Eliquis     Hypertension  Continue on home medication  Imdur metoprolol and valsartan on     Continue other home medication  That includes allopurinol aspirin      Urinary symptoms  Patient reports urgency and frequency  Continue on Flomax  Check pre and postvoid residuals     DVT Prophylaxis: on eliquis  Diet: ADULT DIET; Regular; Low Sodium (2 gm); 1200 ml  Code Status: DNR-CCA     PT/OT Eval Status: ordered     Dispo/Plan of care -   Plan for thoracentesis today, diagnostic studies ordered - transudate, no infection. Cardiology consulted. Cr worsening consult nephrology  Monitor BMP  Echo - Ejection fraction is visually estimated to be 15 %. Severe global hypokinesis. Grade II diastolic dysfunction with elevated LV filling pressures  DC IV lasix, switched to torsemide      Patient is stable for discharge, effusion was transudate, no systemic signs of infection, no growth on culture. Patient medically stable for discharge    Exam:     BP (!) 105/52   Pulse 97   Temp 97.3 °F (36.3 °C) (Oral)   Resp 18   Ht 5' 10\" (1.778 m)   Wt 141 lb 5 oz (64.1 kg)   SpO2 96%   BMI 20.28 kg/m²     General appearance: No apparent distress  HEENT:  Conjunctivae/corneas clear. Neck: Supple, No jugular venous distention. Respiratory:  Normal respiratory effort. Clear to auscultation, bilaterally without Rales/Wheezes/Rhonchi. Cardiovascular: Regular rate and rhythm with normal S1/S2 without murmurs, rubs or gallops. Abdomen: Soft, non-tender, non-distended, normal bowel sounds. Musculoskelatal: No clubbing, cyanosis or edema bilaterally. Skin: Skin color, texture, turgor normal.   Neurologic: no focal neurologic deficits. grossly non-focal.  Psychiatric: Alert and oriented, normal mood    Consults:     IP CONSULT TO HOSPITALIST  IP CONSULT TO HEART FAILURE NURSE/COORDINATOR  IP CONSULT TO DIETITIAN  IP CONSULT TO UROLOGY  IP CONSULT TO NEPHROLOGY  IP CONSULT TO CARDIOLOGY  IP CONSULT TO PALLIATIVE CARE      Code Status:  DNR-CCA    Activity: activity as tolerated    Labs:  For convenience and continuity at follow-up the following most recent labs are provided:      CBC:    Lab Results   Component Value Date/Time    WBC 9.9 12/18/2022 11:06 AM    HGB 13.4 12/18/2022 11:06 AM    HCT 41.3 12/18/2022 11:06 AM     12/18/2022 11:06 AM       Renal:    Lab Results   Component Value Date/Time     12/21/2022 07:46 AM    K 3.9 12/21/2022 07:46 AM    K 4.9 04/28/2021 07:32 PM     12/21/2022 07:46 AM    CO2 19 12/21/2022 07:46 AM    BUN 55 12/21/2022 07:46 AM    CREATININE 2.1 12/21/2022 07:46 AM    CALCIUM 10.4 12/21/2022 07:46 AM       Discharge Medications:     Current Discharge Medication List             Details   torsemide (DEMADEX) 10 MG tablet Take 1 tablet by mouth in the morning and 1 tablet in the evening. Qty: 60 tablet, Refills: 0      famotidine (PEPCID) 20 MG tablet Take 1 tablet by mouth at bedtime  Qty: 60 tablet, Refills: 3                Details   apixaban (ELIQUIS) 2.5 MG TABS tablet Take 2.5 mg by mouth 2 times daily      metoprolol succinate (TOPROL XL) 25 MG extended release tablet Take 12.5 mg by mouth daily      tamsulosin (FLOMAX) 0.4 MG capsule Take 0.4 mg by mouth nightly      valsartan (DIOVAN) 40 MG tablet Take 40 mg by mouth daily      aspirin 81 MG chewable tablet Take 81 mg by mouth daily      allopurinol (ZYLOPRIM) 100 MG tablet Take 200 mg by mouth daily             Time Spent on discharge is more than 30 mints in the examination, evaluation, counseling and review of medications and discharge plan. Signed:    Destiny Hernandez MD   12/21/2022      Thank you Sergey Cade for the opportunity to be involved in this patient's care. If you have any questions or concerns please feel free to contact me at [phone].

## 2022-12-21 NOTE — PLAN OF CARE
Problem: Discharge Planning  Goal: Discharge to home or other facility with appropriate resources  Outcome: Progressing     Problem: Skin/Tissue Integrity  Goal: Absence of new skin breakdown  Description: 1. Monitor for areas of redness and/or skin breakdown  2. Assess vascular access sites hourly  3. Every 4-6 hours minimum:  Change oxygen saturation probe site  4. Every 4-6 hours:  If on nasal continuous positive airway pressure, respiratory therapy assess nares and determine need for appliance change or resting period.   Outcome: Progressing     Problem: Chronic Conditions and Co-morbidities  Goal: Patient's chronic conditions and co-morbidity symptoms are monitored and maintained or improved  Outcome: Progressing  Flowsheets (Taken 12/20/2022 2010)  Care Plan - Patient's Chronic Conditions and Co-Morbidity Symptoms are Monitored and Maintained or Improved: Monitor and assess patient's chronic conditions and comorbid symptoms for stability, deterioration, or improvement     Problem: Pain  Goal: Verbalizes/displays adequate comfort level or baseline comfort level  Outcome: Progressing  Flowsheets (Taken 12/20/2022 0616)  Verbalizes/displays adequate comfort level or baseline comfort level: Encourage patient to monitor pain and request assistance     Problem: ABCDS Injury Assessment  Goal: Absence of physical injury  Outcome: Progressing  Flowsheets (Taken 12/21/2022 0044)  Absence of Physical Injury: Implement safety measures based on patient assessment

## 2022-12-22 LAB
BLOOD CULTURE, ROUTINE: NORMAL
CULTURE, BLOOD 2: NORMAL

## 2023-01-03 ENCOUNTER — HOSPITAL ENCOUNTER (EMERGENCY)
Age: 88
Discharge: HOME | End: 2023-01-03
Attending: EMERGENCY MEDICINE
Payer: MEDICARE

## 2023-01-03 ENCOUNTER — TELEPHONE (OUTPATIENT)
Dept: OTHER | Facility: CLINIC | Age: 88
End: 2023-01-03

## 2023-01-03 ENCOUNTER — APPOINTMENT (OUTPATIENT)
Dept: GENERAL RADIOLOGY | Age: 88
End: 2023-01-03
Payer: MEDICARE

## 2023-01-03 VITALS
WEIGHT: 142.2 LBS | RESPIRATION RATE: 22 BRPM | HEIGHT: 70 IN | OXYGEN SATURATION: 97 % | HEART RATE: 94 BPM | BODY MASS INDEX: 20.36 KG/M2 | SYSTOLIC BLOOD PRESSURE: 112 MMHG | DIASTOLIC BLOOD PRESSURE: 83 MMHG | TEMPERATURE: 97.4 F

## 2023-01-03 DIAGNOSIS — I50.22 CHRONIC SYSTOLIC CONGESTIVE HEART FAILURE (HCC): ICD-10-CM

## 2023-01-03 DIAGNOSIS — J90 RECURRENT RIGHT PLEURAL EFFUSION: Primary | ICD-10-CM

## 2023-01-03 LAB
ANION GAP SERPL CALCULATED.3IONS-SCNC: 11 MMOL/L (ref 3–16)
BASOPHILS ABSOLUTE: 0.1 K/UL (ref 0–0.2)
BASOPHILS RELATIVE PERCENT: 1.1 %
BILIRUBIN URINE: NEGATIVE
BLOOD, URINE: NEGATIVE
BUN BLDV-MCNC: 52 MG/DL (ref 7–20)
CALCIUM SERPL-MCNC: 11.3 MG/DL (ref 8.3–10.6)
CHLORIDE BLD-SCNC: 107 MMOL/L (ref 99–110)
CLARITY: CLEAR
CO2: 23 MMOL/L (ref 21–32)
COLOR: YELLOW
CREAT SERPL-MCNC: 2.3 MG/DL (ref 0.8–1.3)
EKG ATRIAL RATE: 91 BPM
EKG DIAGNOSIS: NORMAL
EKG P AXIS: 37 DEGREES
EKG P-R INTERVAL: 180 MS
EKG Q-T INTERVAL: 446 MS
EKG QRS DURATION: 166 MS
EKG QTC CALCULATION (BAZETT): 548 MS
EKG R AXIS: -70 DEGREES
EKG T AXIS: 46 DEGREES
EKG VENTRICULAR RATE: 91 BPM
EOSINOPHILS ABSOLUTE: 0.1 K/UL (ref 0–0.6)
EOSINOPHILS RELATIVE PERCENT: 1 %
GFR SERPL CREATININE-BSD FRML MDRD: 26 ML/MIN/{1.73_M2}
GLUCOSE BLD-MCNC: 128 MG/DL (ref 70–99)
GLUCOSE URINE: NEGATIVE MG/DL
HCT VFR BLD CALC: 44.8 % (ref 40.5–52.5)
HEMOGLOBIN: 14.2 G/DL (ref 13.5–17.5)
KETONES, URINE: NEGATIVE MG/DL
LEUKOCYTE ESTERASE, URINE: NEGATIVE
LYMPHOCYTES ABSOLUTE: 1.1 K/UL (ref 1–5.1)
LYMPHOCYTES RELATIVE PERCENT: 15.1 %
MCH RBC QN AUTO: 31.6 PG (ref 26–34)
MCHC RBC AUTO-ENTMCNC: 31.7 G/DL (ref 31–36)
MCV RBC AUTO: 99.8 FL (ref 80–100)
MICROSCOPIC EXAMINATION: NORMAL
MONOCYTES ABSOLUTE: 0.5 K/UL (ref 0–1.3)
MONOCYTES RELATIVE PERCENT: 6.9 %
NEUTROPHILS ABSOLUTE: 5.5 K/UL (ref 1.7–7.7)
NEUTROPHILS RELATIVE PERCENT: 75.9 %
NITRITE, URINE: NEGATIVE
PDW BLD-RTO: 15.9 % (ref 12.4–15.4)
PH UA: 5 (ref 5–8)
PLATELET # BLD: 235 K/UL (ref 135–450)
PMV BLD AUTO: 8.1 FL (ref 5–10.5)
POTASSIUM SERPL-SCNC: 4.6 MMOL/L (ref 3.5–5.1)
PRO-BNP: ABNORMAL PG/ML (ref 0–449)
PROTEIN UA: NEGATIVE MG/DL
RBC # BLD: 4.49 M/UL (ref 4.2–5.9)
SODIUM BLD-SCNC: 141 MMOL/L (ref 136–145)
SPECIFIC GRAVITY UA: 1.01 (ref 1–1.03)
TROPONIN: 0.39 NG/ML
URINE REFLEX TO CULTURE: NORMAL
URINE TYPE: NORMAL
UROBILINOGEN, URINE: 0.2 E.U./DL
WBC # BLD: 7.2 K/UL (ref 4–11)

## 2023-01-03 PROCEDURE — 84484 ASSAY OF TROPONIN QUANT: CPT

## 2023-01-03 PROCEDURE — 51798 US URINE CAPACITY MEASURE: CPT

## 2023-01-03 PROCEDURE — 93005 ELECTROCARDIOGRAM TRACING: CPT | Performed by: EMERGENCY MEDICINE

## 2023-01-03 PROCEDURE — 71046 X-RAY EXAM CHEST 2 VIEWS: CPT

## 2023-01-03 PROCEDURE — 85025 COMPLETE CBC W/AUTO DIFF WBC: CPT

## 2023-01-03 PROCEDURE — 83880 ASSAY OF NATRIURETIC PEPTIDE: CPT

## 2023-01-03 PROCEDURE — 99285 EMERGENCY DEPT VISIT HI MDM: CPT

## 2023-01-03 PROCEDURE — 81003 URINALYSIS AUTO W/O SCOPE: CPT

## 2023-01-03 PROCEDURE — 93010 ELECTROCARDIOGRAM REPORT: CPT | Performed by: INTERNAL MEDICINE

## 2023-01-03 PROCEDURE — 80048 BASIC METABOLIC PNL TOTAL CA: CPT

## 2023-01-03 RX ORDER — TORSEMIDE 20 MG/1
20 TABLET ORAL DAILY
Qty: 30 TABLET | Refills: 0 | Status: SHIPPED | OUTPATIENT
Start: 2023-01-03 | End: 2023-02-02

## 2023-01-03 ASSESSMENT — PAIN - FUNCTIONAL ASSESSMENT
PAIN_FUNCTIONAL_ASSESSMENT: 0-10
PAIN_FUNCTIONAL_ASSESSMENT: 0-10

## 2023-01-03 ASSESSMENT — PAIN DESCRIPTION - PAIN TYPE: TYPE: ACUTE PAIN

## 2023-01-03 ASSESSMENT — PAIN SCALES - GENERAL
PAINLEVEL_OUTOF10: 4
PAINLEVEL_OUTOF10: 3

## 2023-01-03 ASSESSMENT — PAIN DESCRIPTION - LOCATION: LOCATION: BACK

## 2023-01-03 NOTE — ED NOTES
Discharge and education instructions reviewed. Patient verbalized understanding, teach-back successful. Patient denied questions at this time. No acute distress noted. Patient instructed to follow-up as noted - return to emergency department if symptoms worsen. Patient verbalized understanding. Discharged per EDMD with discharge instructions.         Олег Fowler RN  01/03/23 0949

## 2023-01-03 NOTE — DISCHARGE INSTRUCTIONS
Demadex (torsemide) to 20 mg daily. Take this in the morning. Follow-up with your primary care provider in 5 to 7 days for recheck.   Return as needed for worsening of symptoms or new symptoms of concern

## 2023-01-03 NOTE — NURSE NAVIGATOR
HF RN noted pt is ER. He was recently admitted for acute on chronic combined systolic and diastolic HF 20/35-96/02. He was seen in consult by HF RN during that stay, see note. Pt has a good understanding of his dx. Pt's dc wt was 141 lbs at that time. His wt in ER today is noted at 141 lbs. ProBNP is elevated but difficult to use as he has CKD. He attended his hospital f/u appt on 12/28. He follows with Cardiology at [facility], Dr Mitul Michael. He has a present appt scheduled for 2/6.     If pt were to be discharged from ED, feel free to leave HF RN a voicemail to assist pt getting an ER f/u appt [phone])

## 2023-01-03 NOTE — ED PROVIDER NOTES
[facility]  eMERGENCY dEPARTMENT eNCOUnter      Pt Name: Shana Tamez  MRN: [id number]  [place] 7/1/1930  Date of evaluation: 1/3/2023  Provider: Mitchell Jensen MD    CHIEF COMPLAINT       Chief Complaint   Patient presents with    Shortness of Breath     SOB that got worse yesterday. Pt with hx of SOB. C/o lower back pain when breathing and sitting. Son suspects pt to have fluid build up. Denies swelling. CRITICAL CARE TIME   Total Critical Care time was 0 minutes, excluding separately reportable procedures. There was a high probability of clinically significant/life threatening deterioration in the patient's condition which required my urgent intervention. HISTORY OF PRESENT ILLNESS  (Location/Symptom, Timing/Onset, Context/Setting, Quality, Duration, Modifying Factors, Severity.)   History From: Patient / Son  Limitations to history : None    Shana Tamez is a 80 y.o. male who presents to the emergency department complaining of shortness of breath and some bilateral flank pain. He noticed some pain in his flank area bilaterally since yesterday morning. No injury. No abdominal pain. No frequency urgency or dysuria. He had recent hospitalization for congestive heart failure and pleural effusion. He has had some increased difficulty breathing for 2 or 3 days. No cough. No chest pain. Nursing Notes were reviewed and I agree. SCREENINGS        [place] Coma Scale  Eye Opening: Spontaneous  Best Verbal Response: Oriented  Best Motor Response: Obeys commands  [place] Angel Coma Scale Score: 15                CIWA Assessment  BP: 109/70  Heart Rate: (!) 110           REVIEW OF SYSTEMS    (2-9 systems for level 4, 10 or more for level 5)     General: No fever or chills. HEENT: No earache rhinorrhea or sore throat. Cardiovascular: No chest pain. Pulmonary: Shortness of breath. No cough. GI: No abdominal pain nausea or vomiting.   No constipation. : No frequency urgency or dysuria. Musculoskeletal: Bilateral flank pain. No lower extremity swelling. Neuro: No extremity weakness numbness or tingling. Except as noted above the remainder of the review of systems was reviewed and negative. 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 No pertinent surgical history. CURRENT MEDICATIONS       Current Discharge Medication List        CONTINUE these medications which have NOT CHANGED    Details   famotidine (PEPCID) 20 MG tablet Take 1 tablet by mouth at bedtime  Qty: 60 tablet, Refills: 3      apixaban (ELIQUIS) 2.5 MG TABS tablet Take 2.5 mg by mouth 2 times daily      metoprolol succinate (TOPROL XL) 25 MG extended release tablet Take 12.5 mg by mouth daily      tamsulosin (FLOMAX) 0.4 MG capsule Take 0.4 mg by mouth nightly      valsartan (DIOVAN) 40 MG tablet Take 40 mg by mouth daily      aspirin 81 MG chewable tablet Take 81 mg by mouth daily      allopurinol (ZYLOPRIM) 100 MG tablet Take 200 mg by mouth daily             ALLERGIES     Adhesive tape    FAMILY HISTORY     History reviewed. No pertinent family history.        SOCIAL HISTORY       Social History     Socioeconomic History    Marital status:      Spouse name: None    Number of children: None    Years of education: None    Highest education level: None   Tobacco Use    Smoking status: Every Day     Types: Pipe   Substance and Sexual Activity    Alcohol use: No    Drug use: No    Sexual activity: Not Currently         PHYSICAL EXAM    (up to 7 for level 4, 8 or more for level 5)     ED Triage Vitals [01/03/23 1425]   BP Temp Temp Source Heart Rate Resp SpO2 Height Weight   109/70 97.4 °F (36.3 °C) Oral (!) 110 22 97 % 5' 10\" (1.778 m) 142 lb 3.2 oz (64.5 kg)       General: Alert thin elderly male in no acute distress. Head: Atraumatic and normocephalic. Eyes: No conjunctival injection. No pallor. Pupils equal round reactive. ENT: Vilinda Sari is clear. Oropharynx is moist without erythema. Neck: Supple, nontender, no adenopathy. Heart: Regular rate and rhythm. No murmurs or gallops noted. Lungs: Breath sounds decreased in the bases bilaterally. No rales or rhonchi. Abdomen: Scaphoid, soft, nontender. No masses organomegaly. Musculoskeletal: No lower extremity edema. Skin: Warm and dry, fair turgor. No pallor or cyanosis. Neuro: Awake, alert, oriented. No focal motor deficits. Normal gait. DIFFERENTIAL DIAGNOSIS   Differential includes but is not limited to congestive heart failure, pleural effusion, pneumonia, anemia, acute on chronic kidney injury, urinary tract infection, urinary retention, musculoskeletal back pain, other. DIAGNOSTIC RESULTS     EKG: All EKG's are interpreted by Mitchell Jensen MD in the absence of a cardiologist.    's rhythm, rate of 91, frequent PVCs, right bundle branch block, left anterior fascicular block. Rhythm strip shows a sinus rhythm with a rate of 91, WV interval 180 ms,  ms with no other ectopy as interpreted by me. Compared to 12/17/2022, if again change noted. RADIOLOGY:   Non-plain film images such as CT, Ultrasound and MRI are read by the radiologist. Plain radiographic images are visualized and preliminarily interpreted Mitchell Jensen MD with the below findings:      Interpretation per the Radiologist below, if available at the time of this note:    XR CHEST (2 VW)   Final Result   Small to moderate right and small left pleural effusions and right basilar   airspace opacity favored to represent atelectasis related to the effusion,   but pneumonia cannot be excluded.                ED BEDSIDE ULTRASOUND:   Performed by ED Physician - none    LABS:  Labs Reviewed   CBC WITH AUTO DIFFERENTIAL - Abnormal; Notable for the following components:       Result Value    RDW 15.9 (*)     All other components within normal limits   BASIC METABOLIC PANEL - Abnormal; Notable for the following components:    Glucose 128 (*)     BUN 52 (*)     Creatinine 2.3 (*)     Est, Glom Filt Rate 26 (*)     Calcium 11.3 (*)     All other components within normal limits   TROPONIN - Abnormal; Notable for the following components:    Troponin 0.39 (*)     All other components within normal limits   BRAIN NATRIURETIC PEPTIDE - Abnormal; Notable for the following components:    Pro-BNP 39,997 (*)     All other components within normal limits   URINALYSIS WITH REFLEX TO CULTURE       All other labs were within normal range or not returned as of this dictation. EMERGENCY DEPARTMENT COURSE and DIFFERENTIAL DIAGNOSIS/MDM:   Vitals:    Vitals:    01/03/23 1425   BP: 109/70   Pulse: (!) 110   Resp: 22   Temp: 97.4 °F (36.3 °C)   TempSrc: Oral   SpO2: 97%   Weight: 142 lb 3.2 oz (64.5 kg)   Height: 5' 10\" (1.778 m)       Patient was given the following medications:  Medications - No data to display          Is this patient to be included in the SEP-1 Core Measure due to severe sepsis or septic shock? No   Exclusion criteria - the patient is NOT to be included for SEP-1 Core Measure due to:  2+ SIRS criteria are not met    Chronic Conditions affecting care: Below   has a past medical history of Cancer ([facility] CHF (congestive heart failure) ([facility] and TIA (transient ischemic attack) (2010). CONSULTS: (Who and What was discussed)  None      Social Determinants : None    Records Reviewed (Source): Recent Admission / Thoracentisis Procedure Note. CC/HPI Summary, DDx, ED Course, and Reassessment: Presents with symptoms as above. His vital signs are stable. His EKG shows a normal sinus rhythm. He is not tachycardic. His chest x-ray shows a reaccumulation of his right pleural effusion with some likely atelectasis. He is afebrile.   His renal functions at his baseline. His troponin is chronically elevated and at his baseline. He is not having chest pain. Review of his office records shows a decrease his Demadex to 10 mg daily because he was getting up at night urinating. I discussed treatment options with the patient and his son. He asked if increasing his diuretic would be beneficial at all, he would prefer not to have a repeat thoracentesis at this point in time if it was not necessary. I told him we could increase the Demadex to 20 mg in the a.m. as opposed to the 10 mg he is currently taking and follow-up in 5 to 7 days for recheck. I told him his primary care provider could always arrange for follow-up with cardiothoracic surgery or interventional radiology if his symptoms worsened or if additional treatment of the recurrent pleural effusion was indicated. Disposition Considerations (tests considered but not done, Admit vs D/C, Shared Decision Making, Pt Expectation of Test or Tx.): As noted above the patient can be managed as an outpatient based on his stable vital signs. My discussion with the patient and his son is that they are agreeable with outpatient treatment. I see no indication for admission at this point in time. His diuretic will be increased. He will follow-up with his primary care provider peer      I am the Primary Clinician of Record. PROCEDURES:  None    FINAL IMPRESSION      1. Recurrent right pleural effusion    2.  Chronic systolic congestive heart failure [facility])          DISPOSITION/PLAN   DISPOSITION Decision To Discharge 01/03/2023 06:19:01 PM      PATIENT REFERRED TO:  [id number]  [facility] [id number]  [phone]    Schedule an appointment as soon as possible for a visit in 1 week      DISCHARGE MEDICATIONS:  Current Discharge Medication List          (Please note that portions of this note were completed with a voice recognition program.  Efforts were made to edit the dictations but occasionally words are mis-transcribed.)    Vin Martinez MD  Attending Emergency Physician        Casa Aponte MD  01/03/23 4322

## 2023-01-03 NOTE — TELEPHONE ENCOUNTER
Writer contacted Dr. Tiffanie Su to inform of 30 day readmission risk. No Decision on disposition at this time.     Call Back: If you need to call back to inform of disposition you can contact me at [phone]

## 2023-01-04 ENCOUNTER — TELEPHONE (OUTPATIENT)
Dept: OTHER | Facility: CLINIC | Age: 88
End: 2023-01-04

## 2023-01-04 NOTE — TELEPHONE ENCOUNTER
Writer attempted to contact patient to set up ED follow up appointment, however, was unsuccessful. Patient's phone rang, then said, \"The party you are calling is not accepting calls at this time. \" The proceeded to ring busy. Writer unable to leave HIPPA appropriate voicemail to call back if they need help with follow up.

## 2023-03-19 ENCOUNTER — HOSPITAL ENCOUNTER (INPATIENT)
Age: 88
LOS: 6 days | Discharge: HOME | DRG: 291 | End: 2023-03-25
Attending: EMERGENCY MEDICINE | Admitting: FAMILY MEDICINE
Payer: MEDICARE

## 2023-03-19 ENCOUNTER — APPOINTMENT (OUTPATIENT)
Dept: GENERAL RADIOLOGY | Age: 88
DRG: 291 | End: 2023-03-19
Payer: MEDICARE

## 2023-03-19 DIAGNOSIS — N17.9 AKI (ACUTE KIDNEY INJURY) (HCC): Primary | ICD-10-CM

## 2023-03-19 DIAGNOSIS — Z51.5 HOSPICE CARE: ICD-10-CM

## 2023-03-19 DIAGNOSIS — J96.01 ACUTE RESPIRATORY FAILURE WITH HYPOXIA (HCC): ICD-10-CM

## 2023-03-19 DIAGNOSIS — I50.9 ACUTE ON CHRONIC CONGESTIVE HEART FAILURE, UNSPECIFIED HEART FAILURE TYPE (HCC): ICD-10-CM

## 2023-03-19 PROBLEM — I50.43 CHF (CONGESTIVE HEART FAILURE), NYHA CLASS I, ACUTE ON CHRONIC, COMBINED (HCC): Status: ACTIVE | Noted: 2023-03-19

## 2023-03-19 LAB
ALBUMIN SERPL-MCNC: 3.3 G/DL (ref 3.4–5)
ALBUMIN/GLOB SERPL: 1 {RATIO} (ref 1.1–2.2)
ALP SERPL-CCNC: 62 U/L (ref 40–129)
ALT SERPL-CCNC: 20 U/L (ref 10–40)
ANION GAP SERPL CALCULATED.3IONS-SCNC: 15 MMOL/L (ref 3–16)
AST SERPL-CCNC: 21 U/L (ref 15–37)
BASE EXCESS BLDV CALC-SCNC: 0.7 MMOL/L
BASOPHILS # BLD: 0.1 K/UL (ref 0–0.2)
BASOPHILS NFR BLD: 0.9 %
BILIRUB SERPL-MCNC: 0.7 MG/DL (ref 0–1)
BUN SERPL-MCNC: 65 MG/DL (ref 7–20)
CALCIUM SERPL-MCNC: 10.7 MG/DL (ref 8.3–10.6)
CHLORIDE SERPL-SCNC: 111 MMOL/L (ref 99–110)
CO2 BLDV-SCNC: 28 MMOL/L
CO2 SERPL-SCNC: 22 MMOL/L (ref 21–32)
COHGB MFR BLDV: 1.2 %
CREAT SERPL-MCNC: 2.9 MG/DL (ref 0.8–1.3)
DEPRECATED RDW RBC AUTO: 18 % (ref 12.4–15.4)
EOSINOPHIL # BLD: 0.1 K/UL (ref 0–0.6)
EOSINOPHIL NFR BLD: 1 %
FLUAV RNA UPPER RESP QL NAA+PROBE: NEGATIVE
FLUBV AG NPH QL: NEGATIVE
GFR SERPLBLD CREATININE-BSD FMLA CKD-EPI: 20 ML/MIN/{1.73_M2}
GLUCOSE SERPL-MCNC: 102 MG/DL (ref 70–99)
HCO3 BLDV-SCNC: 27 MMOL/L (ref 23–29)
HCT VFR BLD AUTO: 43.6 % (ref 40.5–52.5)
HGB BLD-MCNC: 14.2 G/DL (ref 13.5–17.5)
LACTATE BLDV-SCNC: 2.5 MMOL/L (ref 0.4–1.9)
LACTATE BLDV-SCNC: 2.7 MMOL/L (ref 0.4–1.9)
LYMPHOCYTES # BLD: 0.5 K/UL (ref 1–5.1)
LYMPHOCYTES NFR BLD: 9.6 %
MCH RBC QN AUTO: 32.1 PG (ref 26–34)
MCHC RBC AUTO-ENTMCNC: 32.5 G/DL (ref 31–36)
MCV RBC AUTO: 98.7 FL (ref 80–100)
METHGB MFR BLDV: 0.5 %
MONOCYTES # BLD: 0.3 K/UL (ref 0–1.3)
MONOCYTES NFR BLD: 5.9 %
NEUTROPHILS # BLD: 4.6 K/UL (ref 1.7–7.7)
NEUTROPHILS NFR BLD: 82.6 %
NT-PROBNP SERPL-MCNC: ABNORMAL PG/ML (ref 0–449)
O2 THERAPY: NORMAL
PCO2 BLDV: 48.3 MMHG (ref 40–50)
PH BLDV: 7.36 [PH] (ref 7.35–7.45)
PLATELET # BLD AUTO: 162 K/UL (ref 135–450)
PMV BLD AUTO: 8.9 FL (ref 5–10.5)
PO2 BLDV: 31 MMHG
POTASSIUM SERPL-SCNC: 4.3 MMOL/L (ref 3.5–5.1)
PROT SERPL-MCNC: 6.6 G/DL (ref 6.4–8.2)
RBC # BLD AUTO: 4.42 M/UL (ref 4.2–5.9)
SAO2 % BLDV: 56 %
SARS-COV-2 RDRP RESP QL NAA+PROBE: NOT DETECTED
SODIUM SERPL-SCNC: 148 MMOL/L (ref 136–145)
TROPONIN T SERPL-MCNC: 0.11 NG/ML
TROPONIN T SERPL-MCNC: 0.12 NG/ML
TROPONIN T SERPL-MCNC: 0.13 NG/ML
TSH SERPL DL<=0.005 MIU/L-ACNC: 9.13 UIU/ML (ref 0.27–4.2)
WBC # BLD AUTO: 5.6 K/UL (ref 4–11)

## 2023-03-19 PROCEDURE — 83880 ASSAY OF NATRIURETIC PEPTIDE: CPT

## 2023-03-19 PROCEDURE — 6370000000 HC RX 637 (ALT 250 FOR IP): Performed by: PHYSICIAN ASSISTANT

## 2023-03-19 PROCEDURE — 85025 COMPLETE CBC W/AUTO DIFF WBC: CPT

## 2023-03-19 PROCEDURE — 82803 BLOOD GASES ANY COMBINATION: CPT

## 2023-03-19 PROCEDURE — 1200000000 HC SEMI PRIVATE

## 2023-03-19 PROCEDURE — 84484 ASSAY OF TROPONIN QUANT: CPT

## 2023-03-19 PROCEDURE — 71046 X-RAY EXAM CHEST 2 VIEWS: CPT

## 2023-03-19 PROCEDURE — 6370000000 HC RX 637 (ALT 250 FOR IP): Performed by: FAMILY MEDICINE

## 2023-03-19 PROCEDURE — 87040 BLOOD CULTURE FOR BACTERIA: CPT

## 2023-03-19 PROCEDURE — 80053 COMPREHEN METABOLIC PANEL: CPT

## 2023-03-19 PROCEDURE — 87635 SARS-COV-2 COVID-19 AMP PRB: CPT

## 2023-03-19 PROCEDURE — 2580000003 HC RX 258: Performed by: EMERGENCY MEDICINE

## 2023-03-19 PROCEDURE — 36415 COLL VENOUS BLD VENIPUNCTURE: CPT

## 2023-03-19 PROCEDURE — 93005 ELECTROCARDIOGRAM TRACING: CPT | Performed by: PHYSICIAN ASSISTANT

## 2023-03-19 PROCEDURE — 99285 EMERGENCY DEPT VISIT HI MDM: CPT

## 2023-03-19 PROCEDURE — 2580000003 HC RX 258: Performed by: FAMILY MEDICINE

## 2023-03-19 PROCEDURE — 83605 ASSAY OF LACTIC ACID: CPT

## 2023-03-19 PROCEDURE — 87804 INFLUENZA ASSAY W/OPTIC: CPT

## 2023-03-19 PROCEDURE — 6360000002 HC RX W HCPCS: Performed by: FAMILY MEDICINE

## 2023-03-19 PROCEDURE — 84443 ASSAY THYROID STIM HORMONE: CPT

## 2023-03-19 RX ORDER — TAMSULOSIN HYDROCHLORIDE 0.4 MG/1
0.4 CAPSULE ORAL NIGHTLY
Status: DISCONTINUED | OUTPATIENT
Start: 2023-03-19 | End: 2023-03-25 | Stop reason: HOSPADM

## 2023-03-19 RX ORDER — ASPIRIN 81 MG/1
81 TABLET, CHEWABLE ORAL DAILY
Status: DISCONTINUED | OUTPATIENT
Start: 2023-03-19 | End: 2023-03-24

## 2023-03-19 RX ORDER — ACETAMINOPHEN 650 MG/1
650 SUPPOSITORY RECTAL EVERY 6 HOURS PRN
Status: DISCONTINUED | OUTPATIENT
Start: 2023-03-19 | End: 2023-03-25 | Stop reason: HOSPADM

## 2023-03-19 RX ORDER — FLUTICASONE PROPIONATE 50 MCG
2 SPRAY, SUSPENSION (ML) NASAL DAILY
Status: DISCONTINUED | OUTPATIENT
Start: 2023-03-20 | End: 2023-03-25 | Stop reason: HOSPADM

## 2023-03-19 RX ORDER — FAMOTIDINE 20 MG/1
20 TABLET, FILM COATED ORAL NIGHTLY
Status: DISCONTINUED | OUTPATIENT
Start: 2023-03-19 | End: 2023-03-19

## 2023-03-19 RX ORDER — POLYETHYLENE GLYCOL 3350 17 G/17G
17 POWDER, FOR SOLUTION ORAL DAILY PRN
Status: DISCONTINUED | OUTPATIENT
Start: 2023-03-19 | End: 2023-03-25 | Stop reason: HOSPADM

## 2023-03-19 RX ORDER — TORSEMIDE 10 MG/1
30 TABLET ORAL DAILY
Status: ON HOLD | COMMUNITY
End: 2023-03-25 | Stop reason: HOSPADM

## 2023-03-19 RX ORDER — ALLOPURINOL 100 MG/1
200 TABLET ORAL DAILY
Status: DISCONTINUED | OUTPATIENT
Start: 2023-03-19 | End: 2023-03-22

## 2023-03-19 RX ORDER — ONDANSETRON 4 MG/1
4 TABLET, ORALLY DISINTEGRATING ORAL EVERY 8 HOURS PRN
Status: DISCONTINUED | OUTPATIENT
Start: 2023-03-19 | End: 2023-03-25 | Stop reason: HOSPADM

## 2023-03-19 RX ORDER — ASPIRIN 81 MG/1
324 TABLET, CHEWABLE ORAL ONCE
Status: DISCONTINUED | OUTPATIENT
Start: 2023-03-19 | End: 2023-03-19

## 2023-03-19 RX ORDER — SODIUM CHLORIDE 0.9 % (FLUSH) 0.9 %
5-40 SYRINGE (ML) INJECTION EVERY 12 HOURS SCHEDULED
Status: DISCONTINUED | OUTPATIENT
Start: 2023-03-19 | End: 2023-03-25 | Stop reason: HOSPADM

## 2023-03-19 RX ORDER — 0.9 % SODIUM CHLORIDE 0.9 %
250 INTRAVENOUS SOLUTION INTRAVENOUS ONCE
Status: COMPLETED | OUTPATIENT
Start: 2023-03-19 | End: 2023-03-19

## 2023-03-19 RX ORDER — METOPROLOL SUCCINATE 25 MG/1
12.5 TABLET, EXTENDED RELEASE ORAL DAILY
Status: DISCONTINUED | OUTPATIENT
Start: 2023-03-19 | End: 2023-03-25 | Stop reason: HOSPADM

## 2023-03-19 RX ORDER — ACETAMINOPHEN 325 MG/1
650 TABLET ORAL EVERY 6 HOURS PRN
Status: DISCONTINUED | OUTPATIENT
Start: 2023-03-19 | End: 2023-03-25 | Stop reason: HOSPADM

## 2023-03-19 RX ORDER — SODIUM CHLORIDE 0.9 % (FLUSH) 0.9 %
5-40 SYRINGE (ML) INJECTION PRN
Status: DISCONTINUED | OUTPATIENT
Start: 2023-03-19 | End: 2023-03-25 | Stop reason: HOSPADM

## 2023-03-19 RX ORDER — SODIUM CHLORIDE 9 MG/ML
INJECTION, SOLUTION INTRAVENOUS PRN
Status: DISCONTINUED | OUTPATIENT
Start: 2023-03-19 | End: 2023-03-25 | Stop reason: HOSPADM

## 2023-03-19 RX ORDER — TETRACAINE HYDROCHLORIDE 5 MG/ML
1 SOLUTION OPHTHALMIC ONCE
Status: COMPLETED | OUTPATIENT
Start: 2023-03-19 | End: 2023-03-19

## 2023-03-19 RX ORDER — FUROSEMIDE 10 MG/ML
40 INJECTION INTRAMUSCULAR; INTRAVENOUS DAILY
Status: DISCONTINUED | OUTPATIENT
Start: 2023-03-19 | End: 2023-03-20

## 2023-03-19 RX ORDER — ONDANSETRON 2 MG/ML
4 INJECTION INTRAMUSCULAR; INTRAVENOUS EVERY 6 HOURS PRN
Status: DISCONTINUED | OUTPATIENT
Start: 2023-03-19 | End: 2023-03-25 | Stop reason: HOSPADM

## 2023-03-19 RX ADMIN — FLUORESCEIN SODIUM 1 MG: 1 STRIP OPHTHALMIC at 14:00

## 2023-03-19 RX ADMIN — FUROSEMIDE 40 MG: 10 INJECTION, SOLUTION INTRAMUSCULAR; INTRAVENOUS at 17:54

## 2023-03-19 RX ADMIN — SODIUM CHLORIDE 250 ML: 9 INJECTION, SOLUTION INTRAVENOUS at 12:56

## 2023-03-19 RX ADMIN — APIXABAN 2.5 MG: 2.5 TABLET, FILM COATED ORAL at 21:34

## 2023-03-19 RX ADMIN — SODIUM CHLORIDE, PRESERVATIVE FREE 10 ML: 5 INJECTION INTRAVENOUS at 21:35

## 2023-03-19 RX ADMIN — TETRACAINE HYDROCHLORIDE 1 DROP: 5 SOLUTION OPHTHALMIC at 13:59

## 2023-03-19 RX ADMIN — TAMSULOSIN HYDROCHLORIDE 0.4 MG: 0.4 CAPSULE ORAL at 21:34

## 2023-03-19 RX ADMIN — ASPIRIN 81 MG 81 MG: 81 TABLET ORAL at 17:54

## 2023-03-19 RX ADMIN — ALLOPURINOL 200 MG: 100 TABLET ORAL at 17:54

## 2023-03-19 RX ADMIN — ACETAMINOPHEN 650 MG: 325 TABLET ORAL at 21:35

## 2023-03-19 RX ADMIN — METOPROLOL SUCCINATE 12.5 MG: 25 TABLET, EXTENDED RELEASE ORAL at 17:54

## 2023-03-19 ASSESSMENT — PAIN - FUNCTIONAL ASSESSMENT: PAIN_FUNCTIONAL_ASSESSMENT: ACTIVITIES ARE NOT PREVENTED

## 2023-03-19 ASSESSMENT — PAIN DESCRIPTION - FREQUENCY: FREQUENCY: INTERMITTENT

## 2023-03-19 ASSESSMENT — PAIN DESCRIPTION - ORIENTATION: ORIENTATION: LEFT

## 2023-03-19 ASSESSMENT — PAIN DESCRIPTION - LOCATION: LOCATION: SHOULDER

## 2023-03-19 ASSESSMENT — PAIN DESCRIPTION - PAIN TYPE: TYPE: CHRONIC PAIN

## 2023-03-19 ASSESSMENT — PAIN DESCRIPTION - DESCRIPTORS: DESCRIPTORS: ACHING

## 2023-03-19 ASSESSMENT — PAIN DESCRIPTION - ONSET: ONSET: ON-GOING

## 2023-03-19 ASSESSMENT — PAIN SCALES - GENERAL
PAINLEVEL_OUTOF10: 2
PAINLEVEL_OUTOF10: 0
PAINLEVEL_OUTOF10: 0

## 2023-03-19 NOTE — ED PROVIDER NOTES
ED Attending Attestation Note    This patient was seen by the advanced practice provider. I personally saw the patient and performed a substantive portion of the visit including all aspects of the medical decision making. Briefly, 80 y.o. male with past medical history including CHF, CKD, and as below presents with concern for SOB. Was seen at [facility] today for ocular irritation (right eye x1 week) and was noted to be hypotensive and hypoxic. Sent to ED. No baseline O2 req't. I reviewed the patient's discharge summary 12/21/2022 (Dr. Poli Vargas) that reveals patient with EF 66%, grade 2 diastolic dysfunction. 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 male, NAD  HEENT: NCAT. PERRL. EOMI. Mucous membranes are tacky. CV: RRR w/o MRG  Lungs: CTAB. No incr WOB. Abdomen: Soft, nontender, nondistended. No rebound/guarding. EKG interpreted by myself. Rate: 97  Rhythm: sinus with fusion complexes and PACs  Axis: -80  Intervals:   QTc 533  ST Segments: no acute abnormality  T waves: no acute abnormality  Comparison: Compared to 1/3/2023, there is resolution of occasional PVCs impression: sinus with fusion complexes and PACs    XR CHEST (2 VW)   Final Result   CHF with mild pulmonary edema. MDM:   This is a 80-year-old male with HFrEF presenting with complaint of shortness of breath. He is hypoxic on room air but corrects with 2 L nasal cannula. His EKG shows CHF with mild pulmonary edema. Overall, however, the patient looks hypovolemic as his mucous membranes are quite dry. His labs substantiate this. His BUN is elevated at 65 and creatinine is 2.9, up from a baseline around 2.3. His initial lactate is elevated at 2.7. There is no source of an infection and I think this is potentially related to dehydration as well.   BNP is markedly elevated which is chronic for the patient. Troponin slightly elevated, also a chronic finding for the patient. In the absence of chest pain and with an EKG without acute ischemic abnormality I am not concerned for ACS. Patient will require gentle IV fluids as he is likely somewhat over diuresed. He was administered a 250 mL normal saline bolus. He will be admitted to hospital medicine. CRITICAL CARE  I personally saw the patient and independently provided 15 minutes of non-concurrent critical care out of the total shared critical care time provided. This excludes seperately billable procedures. Critical care time was provided for acute hypoxic respiratory failure requiring supplemental oxygen and that required close evaluation and/or intervention with concern for potential patient decompensation. For further details of the patient's emergency department visit, please see the advanced practice provider's documentation. Melonie Farooq MD     This report has been produced using speech recognition software and may contain errors related to that system including errors in grammar, punctuation, and spelling, as well as words and phrases that may be inappropriate. If there are any questions or concerns please feel free to contact the dictating provider for clarification.       Melonie Farooq MD  03/19/23 4011

## 2023-03-19 NOTE — H&P
Hospital Medicine History & Physical      PCP: Pj Gonzalez    Date of Admission: 3/19/2023    Date of Service: Pt seen/examined, with 1st encounter, on 3/19/2023 and Admitted to Inpatient     Chief Complaint:  sob      History Of Present Illness: The patient is a 80 y.o. male with paf, hfref, ckd IIIb, chronic troponin elevation, gout, who presents to [facility] with sob. He drove himself to his eye dr and was found to be hypotensive, hypoxemic there and sent to the ed. He has had eye irritation for the past week but no exam was done in their office. 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 Medications Prior to Admission:    Prior to Admission medications    Medication Sig Start Date End Date Taking?  Authorizing Provider   torsemide (DEMADEX) 20 MG tablet Take 1 tablet by mouth daily 1/3/23 2/2/23  Sandra Croft MD   famotidine (PEPCID) 20 MG tablet Take 1 tablet by mouth at bedtime 12/20/22   Maia Bai MD   apixaban (ELIQUIS) 2.5 MG TABS tablet Take 2.5 mg by mouth 2 times daily    Historical Provider, MD   metoprolol succinate (TOPROL XL) 25 MG extended release tablet Take 12.5 mg by mouth daily    Historical Provider, MD   tamsulosin (FLOMAX) 0.4 MG capsule Take 0.4 mg by mouth nightly    Historical Provider, MD   valsartan (DIOVAN) 40 MG tablet Take 40 mg by mouth daily    Historical Provider, MD   aspirin 81 MG chewable tablet Take 81 mg by mouth daily    Historical Provider, Value Date/Time    COLORU Yellow 01/03/2023 02:42 PM    WBCUA 4 12/18/2022 02:00 AM    RBCUA 186 12/18/2022 02:00 AM    MUCUS 3+ 09/03/2011 07:10 PM    BACTERIA Rare 12/18/2022 02:00 AM    CLARITYU Clear 01/03/2023 02:42 PM    SPECGRAV 1.012 01/03/2023 02:42 PM    LEUKOCYTESUR Negative 01/03/2023 02:42 PM    BLOODU Negative 01/03/2023 02:42 PM    GLUCOSEU Negative 01/03/2023 02:42 PM    GLUCOSEU NEGATIVE 09/03/2011 07:10 PM       ABG  No results found for: JMH1MZD, BEART, F6OWMMDG, PHART, THGBART, QMG6TIR, PO2ART, UDA4OYW        There are no active hospital problems to display for this patient. PHYSICIANS CERTIFICATION:    I certify that Flor Cramer is expected to be hospitalized for more than 2 midnights based on the following assessment and plan:      ASSESSMENT/PLAN:  Acute on chronic systolic, diastolic chf exacerbation   - last ECHO:  12/22 lvef 15% severe global hypokinesis, ddII, severe la dilation, rv severely enlarged and hypokinetic  - bnp  - was given lasix 40 mg iv in the ed, reassess uop before repeating  - hold  BB, ACEi/ARB/arni due to hypotension, holli  - daily wts  - I/Os  - consult CHF RN  - consult palliative medicine    Acute hypoxic and hypercarbic respiratory failure, POA  - so2 < 90% on ra, rr> 18  - supplemental o2 to maintain so2 > 90%    HOLLI ckd III6  - crt baseline 2.1 - 2.3, now 2.9  - prerenal azotemia, due to dehydration  - IVF  - Avoid nephrotoxins    Chronic conditions - continue home meds unless otherwise stated  Paf  Gout       DVT Prophylaxis: eliquis  Code Status: dnr-cca    Dispo - in pt       Belia Grijalva DO    Thank you Charli Bourgeois [address] for the opportunity to be involved in this patient's care. If you have any questions or concerns please feel free to contact me at [phone].

## 2023-03-19 NOTE — ED PROVIDER NOTES
There is no distension. Palpations: Abdomen is soft. There is no mass. Tenderness: There is no abdominal tenderness. There is no guarding or rebound. Hernia: No hernia is present. Musculoskeletal:      Cervical back: Normal range of motion and neck supple. Right lower leg: Edema present. Left lower leg: Edema present. Comments: 2+ pitting edema bilateral lower extremities  No calf tenderness bilaterally   Skin:     General: Skin is warm. Neurological:      Mental Status: He is alert. Psychiatric:         Mood and Affect: Mood normal.         Behavior: Behavior normal.         Thought Content:  Thought content normal.         Judgment: Judgment normal.           DIAGNOSTIC RESULTS   LABS:    Labs Reviewed   BRAIN NATRIURETIC PEPTIDE - Abnormal; Notable for the following components:       Result Value    Pro-BNP 53,134 (*)     All other components within normal limits   CBC WITH AUTO DIFFERENTIAL - Abnormal; Notable for the following components:    RDW 18.0 (*)     Lymphocytes Absolute 0.5 (*)     All other components within normal limits   COMPREHENSIVE METABOLIC PANEL - Abnormal; Notable for the following components:    Sodium 148 (*)     Chloride 111 (*)     Glucose 102 (*)     BUN 65 (*)     Creatinine 2.9 (*)     Est, Glom Filt Rate 20 (*)     Calcium 10.7 (*)     Albumin 3.3 (*)     Albumin/Globulin Ratio 1.0 (*)     All other components within normal limits   TROPONIN - Abnormal; Notable for the following components:    Troponin 0.11 (*)     All other components within normal limits   LACTATE, SEPSIS - Abnormal; Notable for the following components:    Lactic Acid, Sepsis 2.5 (*)     All other components within normal limits   LACTATE, SEPSIS - Abnormal; Notable for the following components:    Lactic Acid, Sepsis 2.7 (*)     All other components within normal limits   TSH - Abnormal; Notable for the following components:    TSH 9.13 (*)     All other components within normal limits   TROPONIN - Abnormal; Notable for the following components:    Troponin 0.12 (*)     All other components within normal limits   COVID-19, RAPID   RAPID INFLUENZA A/B ANTIGENS   CULTURE, BLOOD 1   CULTURE, BLOOD 2   BLOOD GAS, VENOUS   TROPONIN   TROPONIN   BASIC METABOLIC PANEL   MAGNESIUM   CBC WITH AUTO DIFFERENTIAL       When ordered only abnormal lab results are displayed. All other labs were within normal range or not returned as of this dictation. EKG: When ordered, EKG's are interpreted by the Emergency Department Physician in the absence of a cardiologist.  Please see their note for interpretation of EKG. EKG obtained. See Dr. Theresa Aguilar note for interpretation. RADIOLOGY:   Non-plain film images such as CT, Ultrasound and MRI are read by the radiologist. Plain radiographic images are visualized and preliminarily interpreted by the ED Provider with the below findings:        Interpretation per the Radiologist below, if available at the time of this note:    XR CHEST (2 VW)   Final Result   CHF with mild pulmonary edema. No results found. PAST MEDICAL HISTORY      has a past medical history of Cancer ([facility] [address] CHF (congestive heart failure) ([facility] [address] and TIA (transient ischemic attack) (2010).      EMERGENCY DEPARTMENT COURSE and DIFFERENTIAL DIAGNOSIS/MDM:   Vitals:    Vitals:    03/19/23 1415 03/19/23 1445 03/19/23 1542 03/19/23 1754   BP: 101/77 125/70 118/75 111/76   Pulse: 100 (!) 101 (!) 101 89   Resp: 21 22 16 20   Temp:  98 °F (36.7 °C) 97.5 °F (36.4 °C)    TempSrc:  Oral Oral    SpO2: 95% 99% 93%    Weight:       Height:           Patient was given the following medications:  Medications   apixaban (ELIQUIS) tablet 2.5 mg (has no administration in time range)   metoprolol succinate (TOPROL XL) extended release tablet 12.5 mg (12.5 mg Oral Given 3/19/23 1754)   tamsulosin (FLOMAX) capsule 0.4 mg (has no administration in time range)   aspirin chewable tablet 81 mg (81 mg Oral

## 2023-03-19 NOTE — PROGRESS NOTES
4 Eyes Skin Assessment     NAME:  Leeland Lanes  YOB: 1930  MEDICAL RECORD NUMBER:  [id number]    The patient is being assessed for  Admission    I agree that One RN has performed a thorough Head to Toe Skin Assessment on the patient. ALL assessment sites listed below have been assessed. Areas assessed by both nurses:    Head, Face, Ears, Shoulders, Back, Chest, Arms, Elbows, Hands, Sacrum. Buttock, Coccyx, Ischium, and Legs. Feet and Heels        Does the Patient have a Wound? No    Other - patient has history of skin cancer. Patient has lesions and skin tugs scattered all over his body. An abrasion noted to lower sternal region, redness to buttocks and blanchable redness to left heel.         Jose Prevention initiated by RN: Yes   Wound Care Orders initiated by RN: No    Pressure Injury (Stage 3,4, Unstageable, DTI, NWPT, and Complex wounds) if present, place referral order by RN under : NA    New and Established Ostomies, if present place, referral order under : No      Nurse 1 eSignature: Electronically signed by Frank Shepherd RN on 3/19/23 at 6:45 PM EDT    **SHARE this note so that the co-signing nurse can place an eSignature**    Nurse 2 eSignature: Electronically signed by Saul Cesar RN on 3/20/23 at 7:50 PM EDT

## 2023-03-20 LAB
ANION GAP SERPL CALCULATED.3IONS-SCNC: 12 MMOL/L (ref 3–16)
BASOPHILS # BLD: 0 K/UL (ref 0–0.2)
BASOPHILS NFR BLD: 0.8 %
BUN SERPL-MCNC: 61 MG/DL (ref 7–20)
CALCIUM SERPL-MCNC: 10.5 MG/DL (ref 8.3–10.6)
CHLORIDE SERPL-SCNC: 111 MMOL/L (ref 99–110)
CO2 SERPL-SCNC: 23 MMOL/L (ref 21–32)
CREAT SERPL-MCNC: 2.8 MG/DL (ref 0.8–1.3)
DEPRECATED RDW RBC AUTO: 18.2 % (ref 12.4–15.4)
EKG ATRIAL RATE: 97 BPM
EKG DIAGNOSIS: NORMAL
EKG P AXIS: 44 DEGREES
EKG P-R INTERVAL: 170 MS
EKG Q-T INTERVAL: 420 MS
EKG QRS DURATION: 174 MS
EKG QTC CALCULATION (BAZETT): 533 MS
EKG R AXIS: -80 DEGREES
EKG T AXIS: 62 DEGREES
EKG VENTRICULAR RATE: 97 BPM
EOSINOPHIL # BLD: 0.1 K/UL (ref 0–0.6)
EOSINOPHIL NFR BLD: 2.5 %
GFR SERPLBLD CREATININE-BSD FMLA CKD-EPI: 20 ML/MIN/{1.73_M2}
GLUCOSE SERPL-MCNC: 107 MG/DL (ref 70–99)
HCT VFR BLD AUTO: 44.4 % (ref 40.5–52.5)
HGB BLD-MCNC: 14.5 G/DL (ref 13.5–17.5)
LYMPHOCYTES # BLD: 0.7 K/UL (ref 1–5.1)
LYMPHOCYTES NFR BLD: 15.7 %
MAGNESIUM SERPL-MCNC: 2.4 MG/DL (ref 1.8–2.4)
MCH RBC QN AUTO: 32.5 PG (ref 26–34)
MCHC RBC AUTO-ENTMCNC: 32.7 G/DL (ref 31–36)
MCV RBC AUTO: 99.2 FL (ref 80–100)
MONOCYTES # BLD: 0.3 K/UL (ref 0–1.3)
MONOCYTES NFR BLD: 6.1 %
NEUTROPHILS # BLD: 3.5 K/UL (ref 1.7–7.7)
NEUTROPHILS NFR BLD: 74.9 %
PLATELET # BLD AUTO: 165 K/UL (ref 135–450)
PMV BLD AUTO: 8.7 FL (ref 5–10.5)
POTASSIUM SERPL-SCNC: 4.4 MMOL/L (ref 3.5–5.1)
RBC # BLD AUTO: 4.48 M/UL (ref 4.2–5.9)
SODIUM SERPL-SCNC: 146 MMOL/L (ref 136–145)
T3FREE SERPL-MCNC: 1.3 PG/ML (ref 2.3–4.2)
T4 FREE SERPL-MCNC: 1 NG/DL (ref 0.9–1.8)
TROPONIN T SERPL-MCNC: 0.14 NG/ML
TROPONIN T SERPL-MCNC: 0.15 NG/ML
TROPONIN T SERPL-MCNC: 0.16 NG/ML
TROPONIN T SERPL-MCNC: 0.16 NG/ML
WBC # BLD AUTO: 4.7 K/UL (ref 4–11)

## 2023-03-20 PROCEDURE — 6370000000 HC RX 637 (ALT 250 FOR IP): Performed by: FAMILY MEDICINE

## 2023-03-20 PROCEDURE — 80048 BASIC METABOLIC PNL TOTAL CA: CPT

## 2023-03-20 PROCEDURE — 97530 THERAPEUTIC ACTIVITIES: CPT

## 2023-03-20 PROCEDURE — 85025 COMPLETE CBC W/AUTO DIFF WBC: CPT

## 2023-03-20 PROCEDURE — 84439 ASSAY OF FREE THYROXINE: CPT

## 2023-03-20 PROCEDURE — 97535 SELF CARE MNGMENT TRAINING: CPT

## 2023-03-20 PROCEDURE — 83735 ASSAY OF MAGNESIUM: CPT

## 2023-03-20 PROCEDURE — 97162 PT EVAL MOD COMPLEX 30 MIN: CPT

## 2023-03-20 PROCEDURE — 36415 COLL VENOUS BLD VENIPUNCTURE: CPT

## 2023-03-20 PROCEDURE — 84481 FREE ASSAY (FT-3): CPT

## 2023-03-20 PROCEDURE — 97116 GAIT TRAINING THERAPY: CPT

## 2023-03-20 PROCEDURE — 2580000003 HC RX 258: Performed by: FAMILY MEDICINE

## 2023-03-20 PROCEDURE — 93010 ELECTROCARDIOGRAM REPORT: CPT | Performed by: INTERNAL MEDICINE

## 2023-03-20 PROCEDURE — 1200000000 HC SEMI PRIVATE

## 2023-03-20 PROCEDURE — 84484 ASSAY OF TROPONIN QUANT: CPT

## 2023-03-20 PROCEDURE — 6360000002 HC RX W HCPCS: Performed by: FAMILY MEDICINE

## 2023-03-20 RX ORDER — LEVOTHYROXINE SODIUM 0.07 MG/1
75 TABLET ORAL DAILY
Status: DISCONTINUED | OUTPATIENT
Start: 2023-03-20 | End: 2023-03-25 | Stop reason: HOSPADM

## 2023-03-20 RX ADMIN — APIXABAN 2.5 MG: 2.5 TABLET, FILM COATED ORAL at 21:26

## 2023-03-20 RX ADMIN — ALLOPURINOL 200 MG: 100 TABLET ORAL at 09:15

## 2023-03-20 RX ADMIN — FUROSEMIDE 40 MG: 10 INJECTION, SOLUTION INTRAMUSCULAR; INTRAVENOUS at 09:15

## 2023-03-20 RX ADMIN — METOPROLOL SUCCINATE 12.5 MG: 25 TABLET, EXTENDED RELEASE ORAL at 09:15

## 2023-03-20 RX ADMIN — APIXABAN 2.5 MG: 2.5 TABLET, FILM COATED ORAL at 09:15

## 2023-03-20 RX ADMIN — LEVOTHYROXINE SODIUM 75 MCG: 0.07 TABLET ORAL at 19:36

## 2023-03-20 RX ADMIN — SODIUM CHLORIDE, PRESERVATIVE FREE 10 ML: 5 INJECTION INTRAVENOUS at 09:16

## 2023-03-20 RX ADMIN — ACETAMINOPHEN 650 MG: 325 TABLET ORAL at 21:26

## 2023-03-20 RX ADMIN — ASPIRIN 81 MG 81 MG: 81 TABLET ORAL at 09:15

## 2023-03-20 RX ADMIN — SODIUM CHLORIDE, PRESERVATIVE FREE 10 ML: 5 INJECTION INTRAVENOUS at 21:28

## 2023-03-20 RX ADMIN — TAMSULOSIN HYDROCHLORIDE 0.4 MG: 0.4 CAPSULE ORAL at 21:26

## 2023-03-20 RX ADMIN — FLUTICASONE PROPIONATE 2 SPRAY: 50 SPRAY, METERED NASAL at 09:16

## 2023-03-20 ASSESSMENT — PAIN SCALES - GENERAL: PAINLEVEL_OUTOF10: 2

## 2023-03-20 ASSESSMENT — PAIN DESCRIPTION - ONSET: ONSET: ON-GOING

## 2023-03-20 ASSESSMENT — PAIN DESCRIPTION - FREQUENCY: FREQUENCY: INTERMITTENT

## 2023-03-20 ASSESSMENT — PAIN DESCRIPTION - ORIENTATION: ORIENTATION: LEFT

## 2023-03-20 ASSESSMENT — PAIN - FUNCTIONAL ASSESSMENT: PAIN_FUNCTIONAL_ASSESSMENT: PREVENTS OR INTERFERES SOME ACTIVE ACTIVITIES AND ADLS

## 2023-03-20 ASSESSMENT — PAIN DESCRIPTION - LOCATION: LOCATION: SHOULDER

## 2023-03-20 ASSESSMENT — PAIN DESCRIPTION - DESCRIPTORS: DESCRIPTORS: ACHING

## 2023-03-20 ASSESSMENT — PAIN DESCRIPTION - PAIN TYPE: TYPE: CHRONIC PAIN

## 2023-03-20 NOTE — PROGRESS NOTES
Patient non-compliant with fluid restrictions, patient asked for his 3rd cup of coffee at this time.  Family did bring in a small milk shake that patient used to supplement his meal.

## 2023-03-20 NOTE — PLAN OF CARE
Problem: Discharge Planning  Goal: Discharge to home or other facility with appropriate resources  Outcome: Progressing  Flowsheets  Taken 3/19/2023 1645  Discharge to home or other facility with appropriate resources:   Identify barriers to discharge with patient and caregiver   Identify discharge learning needs (meds, wound care, etc)   Refer to discharge planning if patient needs post-hospital services based on physician order or complex needs related to functional status, cognitive ability or social support system  Taken 3/19/2023 1536  Discharge to home or other facility with appropriate resources:   Identify barriers to discharge with patient and caregiver   Arrange for needed discharge resources and transportation as appropriate   Identify discharge learning needs (meds, wound care, etc)     Problem: Safety - Adult  Goal: Free from fall injury  Outcome: Progressing  Flowsheets (Taken 3/19/2023 1536)  Free From Fall Injury: Instruct family/caregiver on patient safety     Problem: ABCDS Injury Assessment  Goal: Absence of physical injury  Outcome: Progressing     Problem: Respiratory - Adult  Goal: Achieves optimal ventilation and oxygenation  Outcome: Progressing     Problem: Cardiovascular - Adult  Goal: Maintains optimal cardiac output and hemodynamic stability  Outcome: Progressing     Problem: Metabolic/Fluid and Electrolytes - Adult  Goal: Electrolytes maintained within normal limits  Outcome: Progressing  Goal: Hemodynamic stability and optimal renal function maintained  Outcome: Progressing

## 2023-03-20 NOTE — PROGRESS NOTES
Patient admitted from the ED to room 4124 due to shortness of breath. Patient presented to the ED with hypoxia and hypotension. Patient alert and oriented X 4, patient can recall month and day of the week but not date. Vitals stable on 2 L oxygen via NC  SR on tele  Denies any pain or discomfort    Answers questions appropriately. Hard of hearing     Patient ambulated to bathroom  MALHOTRA without difficulty  Gait unsteady  Utilizes a walker     Lungs clear, diminished bilaterally  Dyspnea at rest and with exertion    S 1 S 2 audible  3+ pitting edema to BLE     Bowels sounds active. Patient continent of bowel and bladder. Scattered skin lesions and tugs noted. An abrasion noted to lower sternal region  Redness to buttocks and blanchable redness to left heel.        Orders reviewed with patient and family  Nursing assessment completed and in chart     Call light and belongings placed within reach    No needs voiced at this time

## 2023-03-20 NOTE — PROGRESS NOTES
Hospitalist Progress Note    CC: CHF (congestive heart failure), NYHA class I, acute on chronic, combined ([facility] [address]      Admit date: 3/19/2023  Days in hospital:  1    Subjective/interval history: Pt S/E. Pt had moderate uop after lasix given, but not recorded. He states he feels better today. O2 status: 2L    ROS:   Pertinent items are noted in HPI. Objective:    /70   Pulse 91   Temp 97.5 °F (36.4 °C) (Oral)   Resp 18   Ht 5' 10\" (1.778 m)   Wt 152 lb 1.9 oz (69 kg)   SpO2 91%   BMI 21.83 kg/m²     Gen: alert, NAD  HEENT: NC/AT, moist mucous membranes  Neck: supple, trachea midline  Heart: Normal s1/s2, RRR, no murmurs, gallops, or rubs. Lungs:  no wheezing, + mild rales in lung bases, no rhonchi, no use of accessory muscles  Abd: bowel sounds present, soft, nontender, nondistended, no masses  Extrem: No clubbing, cyanosis, +1 pitting  edema bl le - improved from yesterday  Skin: no rashes or lesions  Psych: A & O x3, affect appropriate  Neuro: grossly intact, moves all four extremities spontaneously.  No focal deficits  Cap refill: +2 sec    Medications:  Scheduled Meds:   levothyroxine  75 mcg Oral Daily    apixaban  2.5 mg Oral BID    metoprolol succinate  12.5 mg Oral Daily    tamsulosin  0.4 mg Oral Nightly    aspirin  81 mg Oral Daily    allopurinol  200 mg Oral Daily    sodium chloride flush  5-40 mL IntraVENous 2 times per day    fluticasone  2 spray Each Nostril Daily       PRN Meds:  sodium chloride flush, sodium chloride, ondansetron **OR** ondansetron, polyethylene glycol, acetaminophen **OR** acetaminophen    IV:   sodium chloride           Intake/Output Summary (Last 24 hours) at 3/20/2023 1459  Last data filed at 3/20/2023 1430  Gross per 24 hour   Intake 2100 ml   Output --   Net 2100 ml       Results:  CBC:   Recent Labs     03/19/23  1035 03/20/23  0424   WBC 5.6 4.7   HGB 14.2 14.5   HCT 43.6 44.4   MCV 98.7 99.2    165     BMP:   Recent Labs

## 2023-03-20 NOTE — ACP (ADVANCE CARE PLANNING)
Advance Care Planning     Advance Care Planning Activator (Inpatient)  Conversation Note      Date of ACP Conversation: 3/20/2023     Conversation Conducted with: Patient with Decision Making Capacity    ACP Activator: TRAY Banuelos    Health Care Decision Maker:     Current Designated Health Care Decision Maker:     Primary Decision Maker: Hilary Langford - Child - [phone]    Secondary Decision Maker: Antonio Carlson - Child - [phone]    Supplemental (Other) Decision Maker: Joneldelia Saldanaey - Child - [phone]    Care Preferences    Pt is a DNR, confirmed this with pt and son who is  Graciela Speaks       [] Yes   [x] No   Educated Patient / Mercedes Span regarding differences between Advance Directives and portable DNR orders.     Length of ACP Conversation in minutes:  2    Conversation Outcomes:  ACP discussion completed    Follow-up plan:    [] Schedule follow-up conversation to continue planning  [] Referred individual to Provider for additional questions/concerns   [] Advised patient/agent/surrogate to review completed ACP document and update if needed with changes in condition, patient preferences or care setting    [x] This note routed to one or more involved healthcare providers    Electronically signed by TRAY Banuelos on 3/20/2023 at 2:05 PM

## 2023-03-20 NOTE — CARE COORDINATION
[facility]    Referral received from  to follow for home care services. I will follow for needs, and speak with patient to verify demos.     Noel Chandra RN, BSN CTN  [facility] [phone]

## 2023-03-20 NOTE — PROGRESS NOTES
Patient refused to use a urinal but did agree to sit down on the toilet so nurse could measure urine output with hat.

## 2023-03-20 NOTE — CONSULTS
HF RN consult noted, per order set. Chart reviewed. HF RNs are very familiar with Mr Erlin Parr just as he is familiar with us. Both myself and fellow HF RN have met with Mr Erlin Parr to include last admit. He has an excellent understanding of his HF diagnosis as does his son Eugene Villanueva. His son Eugene Villanueva is a , reads labels etc. He preps and makes his dad's meals for him and then Mr Erlin Parr heats them up accordingly. Pt weighs daily and knows who and when to call. He follows with Dr Elizabeth Antony at [facility]. At last office visit on 3/3 he shared he was up approx 5 lbs at 150 lbs. His dry wt is closer to 145 lbs. Since felt to be volume overloaded, his torsemide was increased to 40 mg x5 days and then back to his 30 mg. He was sent in to ER by eye MD office. He drove himself to eye appt and was found to be hypotensive as well as hypoxic. He is being treated for acute on chronic combined HF this admit. There is a palliative care consult in place, pt is DNR-CC. Pt does follows with an outpt Palliative Care program by [facility] and a NP sees him monthly from their program. They are requesting home care this admit and chose [facility] HF program. SW/CM following. Pt has a cleaning person come twice a month. He uses [address] [place] [address] and denies any issues with obtaining his meds. Appropriate HF orders are in place. HF RN will follow peripherally at this time. Will help ensure HF metrics/best practices are met and help with making hospital f/u appt.

## 2023-03-20 NOTE — PROGRESS NOTES
awareness;Decreased endurance;Decreased balance  Assessment: Pt. presents with CHF, hypotensive at Urgent care of which Pt. went d/t eye irritation and found to have BP 69/40 and SpO2 in the 70s. Setn to the Hospital for evaluation. Pt. at this time presents slightly below baseline functional status. Lives alone but has support of sons for meals and med management as well as frequent visits. Pt. is having weakness in L LE of 3 to 3+/5 and noticable dragging LE with ambulation. Family reports this may have been present subtly but believed to be worse. Notified RN and left note for MD.  Hopeful that d/t the good support of family, Pt. could return home with home PT. Treatment Diagnosis: L LE weakness and decreased endurance  Therapy Prognosis: Good  Decision Making: High Complexity  History: as above  Exam: as abo0ve  Clinical Presentation: evolving  Barriers to Learning: [place]  Requires PT Follow-Up: Yes  Activity Tolerance  Activity Tolerance: Patient tolerated treatment well;Patient limited by endurance  Activity Tolerance Comments: BP in sitting 101/60. Standing 90/59     Plan   Physcial Therapy Plan  General Plan: 3-5 times per week  Current Treatment Recommendations: Strengthening, Balance training, Functional mobility training, Transfer training, Neuromuscular re-education, Gait training, Stair training, Equipment evaluation, education, & procurement, Patient/Caregiver education & training, Safety education & training, Home exercise program, Therapeutic activities, Endurance training  Safety Devices  Type of Devices: Call light within reach, Chair alarm in place, Left in chair, Nurse notified, Patient at risk for falls, Gait belt, All fall risk precautions in place (Simultaneous filing.  User may not have seen previous data.)  Restraints  Restraints Initially in Place: No     Restrictions  Restrictions/Precautions  Restrictions/Precautions: Fall Risk (High)  Required Braces or Orthoses?: No  Position Ambulation?: No  Stairs/Curb  Stairs?: No     Balance  Sitting - Static: Good  Sitting - Dynamic: Good  Standing - Static: Good;-  Standing - Dynamic: Fair;+  Comments: Steady with standing at the walker x 1-2 minutes - S/CGA                                                           AM-PAC Score  AM-PAC Inpatient Mobility Raw Score : 17 (03/20/23 1518)  AM-PAC Inpatient T-Scale Score : 42.13 (03/20/23 1518)  Mobility Inpatient CMS 0-100% Score: 50.57 (03/20/23 1518)  Mobility Inpatient CMS G-Code Modifier : CK (03/20/23 1518)                 Goals  Short Term Goals  Time Frame for Short Term Goals: By d/c  Short Term Goal 1: Supine to/from sit from flat bed with Mod I  Short Term Goal 2: Sit to/from stand with Mod I  Short Term Goal 3: Amb. x 100' with FWW with Mod I  Short Term Goal 4: Up and down 2 steps with rail and Supervision  Patient Goals   Patient Goals : Return home       Education  Patient Education  Education Given To: Patient; Family  Education Provided: Role of Therapy;Plan of Care  Education Provided Comments: findings on eval  Education Method: Verbal  Barriers to Learning: Hearing  Education Outcome: Verbalized understanding    Second Session Therapy Time:   Individual Concurrent Group Co-treatment   Time In 2572         Time Out 1345         Minutes 49           Timed Code Treatment Minutes:  34    Total Treatment Minutes:    [address] [facility], [address] [facility], [id number]

## 2023-03-20 NOTE — CARE COORDINATION
Case Management Assessment  Initial Evaluation    Date/Time of Evaluation: 3/20/2023 2:18 PM  Assessment Completed by: TRAY Titus    If patient is discharged prior to next notation, then this note serves as note for discharge by case management. Case Management Notes: met with pt at bedside with son Kamlesh Lepe present. Pt is hopefuly he can return home at [place]. He is active with [facility] and has an NP that sees him monthly, [phone]. Pt interested in home care and son requesting a CHF program for home. Home care list provided and they chose [facility]. Referral made. Pt reports he has someone come clean 2x/mo for him and a nurse friend that visits to help him as well if needed. Pt takes care of himself at home and son prepares meals that pt reheats as needed. Pt is not on O2 at home. Son will transport home. SNF list provided to son with [facility]. list if needed    Patient Name: Anatoly Up                   YOB: 1930  Diagnosis: HOLLI (acute kidney injury) ([facility] [N17.9]  Acute respiratory failure with hypoxia ([address] [J96.01]  CHF (congestive heart failure), NYHA class I, acute on chronic, combined ([address] [I50.43]  Acute on chronic congestive heart failure, unspecified heart failure type ([address] [I50.9]                   Date / Time: 3/19/2023 10:13 AM    Patient Admission Status: Inpatient   Readmission Risk (Low < 19, Mod (19-27), High > 27): Readmission Risk Score: 16    Current PCP: Charli Orellana  PCP verified by CM? Yes    Chart Reviewed: Yes      History Provided by: Patient, Child/Family (rashad Lepe)  Patient Orientation: Alert and Oriented    Patient Cognition: Alert    Hospitalization in the last 30 days (Readmission):  No    If yes, Readmission Assessment in  Navigator will be completed.     Advance Directives:      Code Status: DNR-CC   Patient's Primary Decision Maker is: Legal Next of Kin    Primary Decision MakeLobito Ryan Maryam Child - [phone]    Secondary Decision Maker: MarychuyVega - Child - [phone]    Supplemental (Other) Decision Maker: Ernestine Lo - Child - [phone]    Discharge Planning:    Patient lives with: Alone Type of Home: House  Primary Care Giver: Self  Patient Support Systems include: Children   Current Financial resources: None  Current community resources: None  Current services prior to admission: Other (Comment) ([facility])            Current DME:              Type of Home Care services:  Skilled Therapy, OT, PT, Nursing Services    ADLS  Prior functional level: Shopping, [address], Cooking, Assistance with the following:  Current functional level: Assistance with the following:, Cooking, Shopping, Housework, Mobility    PT AM-PAC:   /24  OT AM-PAC:   /24    Family can provide assistance at DC: Yes  Would you like Case Management to discuss the discharge plan with any other family members/significant others, and if so, who?  Yes (rashad Lindsey)  Plans to Return to Present Housing: Yes  Other Identified Issues/Barriers to RETURNING to current housing: too weak, lives alone  Potential Assistance needed at discharge: [address], Other (Comment) (CHF program, may need O2 at dc)            Potential DME:    Patient expects to discharge to: [address] [place] [address] for transportation at discharge:      Financial    Payor: Cheo Suero / Plan: Destiny Fernandes ESSENTIAL/PLUS / Product Type: *No Product type* /     Does insurance require precert for SNF: Yes    Potential assistance Purchasing Medications: No  [facility] [phone] [address] [phone] Rianna Ayala [phone]  Erick Neves [phone]  Phone: [phone] Fax: [phone]      Notes:    Factors facilitating achievement of predicted outcomes: Family support, Motivated, Cooperative, and Pleasant    Barriers to discharge: Lower extremity weakness    Additional Case Management Notes: met with pt at bedside with rashad Lindsey

## 2023-03-20 NOTE — PLAN OF CARE
Problem: Discharge Planning  Goal: Discharge to home or other facility with appropriate resources  3/20/2023 0046 by Adam Campos RN  Outcome: Progressing  3/19/2023 2101 by Sukhwinder Gallo RN  Outcome: Progressing  Flowsheets  Taken 3/19/2023 1645  Discharge to home or other facility with appropriate resources:   Identify barriers to discharge with patient and caregiver   Identify discharge learning needs (meds, wound care, etc)   Refer to discharge planning if patient needs post-hospital services based on physician order or complex needs related to functional status, cognitive ability or social support system  Taken 3/19/2023 1536  Discharge to home or other facility with appropriate resources:   Identify barriers to discharge with patient and caregiver   Arrange for needed discharge resources and transportation as appropriate   Identify discharge learning needs (meds, wound care, etc)     Problem: Safety - Adult  Goal: Free from fall injury  3/20/2023 0046 by Adam Campos RN  Outcome: Progressing  3/19/2023 2101 by Sukhwinder Gallo RN  Outcome: Progressing  Flowsheets (Taken 3/19/2023 1536)  Free From Fall Injury: Instruct family/caregiver on patient safety     Problem: ABCDS Injury Assessment  Goal: Absence of physical injury  3/20/2023 0046 by Adam Campos RN  Outcome: Progressing  3/19/2023 2101 by Sukhwinder Gallo RN  Outcome: Progressing     Problem: Respiratory - Adult  Goal: Achieves optimal ventilation and oxygenation  3/20/2023 0046 by Adam Campos RN  Outcome: Progressing  3/19/2023 2101 by Sukhwinder Gallo RN  Outcome: Progressing     Problem: Cardiovascular - Adult  Goal: Maintains optimal cardiac output and hemodynamic stability  3/20/2023 0046 by Adam Campos RN  Outcome: Progressing  3/19/2023 2101 by Sukhwinder Gallo RN  Outcome: Progressing  Goal: Absence of cardiac dysrhythmias or at baseline  Outcome: Progressing     Problem: Metabolic/Fluid and Electrolytes - Adult  Goal: Electrolytes maintained within normal limits  3/20/2023 0046 by Nazario Jackson RN  Outcome: Progressing  3/19/2023 2101 by dEilberto Son RN  Outcome: Progressing  Goal: Hemodynamic stability and optimal renal function maintained  3/20/2023 0046 by Nazario Jackson RN  Outcome: Laurita Munoz  3/19/2023 2101 by Edilberto Son RN  Outcome: Progressing  Goal: Glucose maintained within prescribed range  Outcome: Progressing     Problem: Neurosensory - Adult  Goal: Achieves stable or improved neurological status  Outcome: Progressing  Goal: Absence of seizures  Outcome: Progressing  Goal: Remains free of injury related to seizures activity  Outcome: Progressing  Goal: Achieves maximal functionality and self care  Outcome: Progressing     Problem: Skin/Tissue Integrity - Adult  Goal: Skin integrity remains intact  Outcome: Progressing  Goal: Incisions, wounds, or drain sites healing without S/S of infection  Outcome: Progressing  Goal: Oral mucous membranes remain intact  Outcome: Progressing     Problem: Musculoskeletal - Adult  Goal: Return mobility to safest level of function  Outcome: Progressing  Goal: Maintain proper alignment of affected body part  Outcome: Progressing  Goal: Return ADL status to a safe level of function  Outcome: Progressing     Problem: Genitourinary - Adult  Goal: Absence of urinary retention  Outcome: Progressing     Problem: Infection - Adult  Goal: Absence of infection at discharge  Outcome: Progressing  Goal: Absence of infection during hospitalization  Outcome: Progressing  Goal: Absence of fever/infection during anticipated neutropenic period  Outcome: Progressing

## 2023-03-20 NOTE — DISCHARGE INSTR - COC
Continuity of Care Form    Patient Name: Leelee Mayer   :  1930  MRN:  [id number]    Admit date:  3/19/2023  Discharge date:  ***    Code Status Order: DNR-CC   Advance Directives:     Admitting Physician:  Isabel Thomson DO  PCP: No primary care provider on file. Discharging Nurse: [facility] Unit/Room#: [id number]/[id number]  Discharging Unit Phone Number: ***    Emergency Contact:   Extended Emergency Contact Information  Primary Emergency Contact: Brandy Chinchilla, [place]  Home Phone: [phone]  Mobile Phone: [phone]  Relation: Child  Secondary Emergency Contact: Vega Perrin  Home Phone: [phone]  Relation: Child  Preferred language: English    Past Surgical History:  No past surgical history on file.     Immunization History:   Immunization History   Administered Date(s) Administered    COVID-19, MODERNA BLUE border, Primary or Immunocompromised, (age 12y+), IM, 100 mcg/0.5mL 2021, 2021       Active Problems:  Patient Active Problem List   Diagnosis Code    Acute on chronic systolic heart failure (HCC) I50.23    Essential hypertension I10    Stage 3b chronic kidney disease (HCC) N18.32    Moderate malnutrition ([address] E44.0    SOB (shortness of breath) R06.02    Demand ischemia (HCC) I24.8    Acute kidney injury superimposed on CKD ([address] N17.9, N18.9    CHF (congestive heart failure), NYHA class I, acute on chronic, combined ([facility] [address] [address] I50.43       Isolation/Infection:   Isolation            No Isolation          Patient Infection Status       Infection Onset Added Last Indicated Last Indicated By Review Planned Expiration Resolved Resolved By    None active    Resolved    COVID-19 (Rule Out) 23 COVID-19, Rapid (Ordered)   23 Rule-Out Test Resulted    COVID-19 (Rule Out) 22 COVID-19, Rapid (Ordered)   22 Rule-Out Test Resulted    COVID-19 (Rule Out) 21 SARS-CoV-2 NAAT (Rapid) (Ordered) 21 Rule-Out Test Resulted            Nurse Assessment:  Last Vital Signs: /70   Pulse 91   Temp 97.5 °F (36.4 °C) (Oral)   Resp 18   Ht 5' 10\" (1.778 m)   Wt 152 lb 1.9 oz (69 kg)   SpO2 91%   BMI 21.83 kg/m²     Last documented pain score (0-10 scale): Pain Level: 0  Last Weight:   Wt Readings from Last 1 Encounters:   23 152 lb 1.9 oz (69 kg)     Mental Status:  {IP PT MENTAL STATUS:}    IV Access:  { NELL IV ACCESS:573461614}    Nursing Mobility/ADLs:  Walking   {CHP DME ECT}  Transfer  {CHP DME NNNO:480568362}  Bathing  {CHP DME GVQF:463496411}  Dressing  {CHP DME DIE}  Toileting  {CHP DME LJBI:177410879}  Feeding  {CHP DME CKPE:815857087}  Med Admin  {P DME MLGF:999350731}  Med Delivery   { NELL MED Delivery:218690712}    Wound Care Documentation and Therapy:        Elimination:  Continence: Bowel: {YES / FV:19550}  Bladder: {YES / BE:15428}  Urinary Catheter: {Urinary Catheter:831540665}   Colostomy/Ileostomy/Ileal Conduit: {YES / CO:42118}       Date of Last BM: ***    Intake/Output Summary (Last 24 hours) at 3/20/2023 1606  Last data filed at 3/20/2023 1430  Gross per 24 hour   Intake 2100 ml   Output --   Net 2100 ml     I/O last 3 completed shifts:   In: 61 [P.O.:60]  Out: -     Safety Concerns:     [address] Concerns:433426833}    Impairments/Disabilities:      [address] Impairments/Disabilities:216264199}    Nutrition Therapy:  Current Nutrition Therapy:   [address] Diet List:598349355}    Routes of Feeding: {CHP DME Other Feedings:021832296}  Liquids: {Slp liquid thickness:33358}  Daily Fluid Restriction: {CHP DME Yes amt example:848911342}  Last Modified Barium Swallow with Video (Video Swallowing Test): {Done Not Done EDZD:988830636}    Treatments at the Time of Hospital Discharge:   Respiratory Treatments: ***  Oxygen Therapy:  {Therapy; copd oxygen:63020}  Ventilator:    {MH CC Vent [facility]}    Rehab Therapies: {THERAPEUTIC

## 2023-03-20 NOTE — PROGRESS NOTES
deficits  Initiation: Requires cues for some  Cognition Comment: Mildly slow to process but with time, accurate' pt is a poor historian  Orientation  Overall Orientation Status: Within Functional Limits  Orientation Level: Oriented X4               Functional Mobility Circuit Training: Pt ambulated from EOB>toilet>sink>EOB with CGA and RW  Static Sitting Balance Exercises: Supervision seated EOB  Dynamic Sitting Balance Exercises: SBA/CGA seated on toilet to complete pericare  Static Standing Balance Exercises: CGA leaning forward on sink to complete oral care/wash face/comb hair for 4-5x minutes  Dynamic Standing Balance Exercises: CGA for functional mobility with RW  Education Given To: Patient  Education Provided: Role of Therapy;Plan of Care  Education Method: Verbal  Barriers to Learning: Cognition  Education Outcome: Continued education needed;Verbalized understanding  LUE PROM (degrees)  LUE PROM: WFL  LUE AROM (degrees)  LUE AROM : WFL  RUE PROM (degrees)  RUE PROM: WFL  RUE AROM (degrees)  RUE AROM : [facility]      AM-PAC Score        AM-[facility] Inpatient Daily Activity Raw Score: 19 (03/20/23 1548)  AM-PAC Inpatient ADL T-Scale Score : 40.22 (03/20/23 1548)  ADL Inpatient CMS 0-100% Score: 42.8 (03/20/23 1548)  ADL Inpatient CMS G-Code Modifier : CK (03/20/23 1548)    Goals  Short Term Goals  Time Frame for Short Term Goals: prior to d/c  Short Term Goal 1: Supervision for functional transfers/functional mobility to complete ADL task  Short Term Goal 2: Supervision LB dressing  Short Term Goal 3: Supervision toileting  Short Term Goal 4: Supervision to complete grooming task in stance  Short Term Goal 5: Supervision LB bathing  Long Term Goals  Time Frame for Long Term Goals : STGs=LTGs  Patient Goals   Patient goals :  To return home       Therapy Time   Individual Concurrent Group Co-treatment   Time In 0230         Time Out 0310         Minutes 40         Timed Code Treatment Minutes: 40 Minutes Electronically signed by Bartholome Sever, OT on 3/20/2023 at 3:52 PM

## 2023-03-20 NOTE — CONSULTS
Heart Failure Diet Education:    Per hospital protocol or consult, patient being seen for Heart Failure diet guidelines. Learners: [x]Patient []Family []Caregiver [] Other: ______________  Methods: [x]Verbal Education  [x]Handouts  []Teachback     Patient's Lifestyle Questions:   Is HF a new Diagnosis? [x]Yes []No    Has patient had prior education? []Yes [x]No    Who does Grocery shopping and cooking? Pt's son    Frequency of eating out? Rarely    Typical Eating Habits: Pt says that his son is a  and does the cooking and shopping for him. He says that his son is good at label reading and watching his sodium intake. Pt uses table salt infrequently. Instructed the Patient on:   [x] High/Low Sodium foods    [x] Moderation/portion control  [x] Eating out  [] Fluid Restriction    [] Label reading  [] Carb Counting   [] Other:    Educational Materials Provided:   [x] Nutrition Care Manual (NCM) Heart Failure Nutrition Therapy   [] NCM Sodium (Salt) Content of Foods  [] NCM Sodium Free Flavoring Tips    [] Heart Healthy Eating Label Reading Tips   [] Healthy Eating when Eating Out  [] Controlling Your Fluids   [] [facility] (from [employer])  [] NCM Carbohydrate Counting for People with Diabetes   [] Managing Your Diabetes Plate Method     -Diet Recommendations: 2000 mg Sodium/day, 64 oz Fluids/day     Monitoring/Evaluation:   -Barriers: None  -Evaluation of education: Indicates understanding.  -Expected compliance: good.     Total time involved in patient education: 5 minutes      Electronically signed by Zandra Finch on 3/20/2023 at 12:24 PM

## 2023-03-21 LAB
ANION GAP SERPL CALCULATED.3IONS-SCNC: 15 MMOL/L (ref 3–16)
BUN SERPL-MCNC: 48 MG/DL (ref 7–20)
CALCIUM SERPL-MCNC: 10.5 MG/DL (ref 8.3–10.6)
CHLORIDE SERPL-SCNC: 109 MMOL/L (ref 99–110)
CO2 SERPL-SCNC: 21 MMOL/L (ref 21–32)
CREAT SERPL-MCNC: 2.5 MG/DL (ref 0.8–1.3)
GFR SERPLBLD CREATININE-BSD FMLA CKD-EPI: 23 ML/MIN/{1.73_M2}
GLUCOSE SERPL-MCNC: 114 MG/DL (ref 70–99)
MAGNESIUM SERPL-MCNC: 2.3 MG/DL (ref 1.8–2.4)
POTASSIUM SERPL-SCNC: 4.1 MMOL/L (ref 3.5–5.1)
SODIUM SERPL-SCNC: 145 MMOL/L (ref 136–145)
TROPONIN T SERPL-MCNC: 0.15 NG/ML
TROPONIN T SERPL-MCNC: 0.17 NG/ML

## 2023-03-21 PROCEDURE — 1200000000 HC SEMI PRIVATE

## 2023-03-21 PROCEDURE — 2580000003 HC RX 258: Performed by: FAMILY MEDICINE

## 2023-03-21 PROCEDURE — 83735 ASSAY OF MAGNESIUM: CPT

## 2023-03-21 PROCEDURE — 97530 THERAPEUTIC ACTIVITIES: CPT

## 2023-03-21 PROCEDURE — 6370000000 HC RX 637 (ALT 250 FOR IP): Performed by: FAMILY MEDICINE

## 2023-03-21 PROCEDURE — 97116 GAIT TRAINING THERAPY: CPT

## 2023-03-21 PROCEDURE — 97535 SELF CARE MNGMENT TRAINING: CPT

## 2023-03-21 PROCEDURE — 36415 COLL VENOUS BLD VENIPUNCTURE: CPT

## 2023-03-21 PROCEDURE — 84484 ASSAY OF TROPONIN QUANT: CPT

## 2023-03-21 PROCEDURE — 80048 BASIC METABOLIC PNL TOTAL CA: CPT

## 2023-03-21 RX ADMIN — APIXABAN 2.5 MG: 2.5 TABLET, FILM COATED ORAL at 09:23

## 2023-03-21 RX ADMIN — TAMSULOSIN HYDROCHLORIDE 0.4 MG: 0.4 CAPSULE ORAL at 20:19

## 2023-03-21 RX ADMIN — FLUTICASONE PROPIONATE 2 SPRAY: 50 SPRAY, METERED NASAL at 09:24

## 2023-03-21 RX ADMIN — SODIUM CHLORIDE, PRESERVATIVE FREE 10 ML: 5 INJECTION INTRAVENOUS at 20:21

## 2023-03-21 RX ADMIN — LEVOTHYROXINE SODIUM 75 MCG: 0.07 TABLET ORAL at 05:22

## 2023-03-21 RX ADMIN — APIXABAN 2.5 MG: 2.5 TABLET, FILM COATED ORAL at 20:19

## 2023-03-21 RX ADMIN — ALLOPURINOL 200 MG: 100 TABLET ORAL at 09:24

## 2023-03-21 RX ADMIN — ASPIRIN 81 MG 81 MG: 81 TABLET ORAL at 09:24

## 2023-03-21 RX ADMIN — SODIUM CHLORIDE, PRESERVATIVE FREE 10 ML: 5 INJECTION INTRAVENOUS at 10:24

## 2023-03-21 ASSESSMENT — PAIN SCALES - GENERAL
PAINLEVEL_OUTOF10: 0
PAINLEVEL_OUTOF10: 0

## 2023-03-21 NOTE — PROGRESS NOTES
Occupational Therapy  Facility/Department: [facility]  Occupational Therapy Daily Treatment Note    Name: Shanice Shields  : 1930  MRN: [id number]  Date of Service: 3/21/2023    Discharge Recommendations:  24 hour supervision or assist, Home with Home health OT        Shanice Shields scored a 19/24 on the AM-PAC ADL Inpatient form. Current research shows that an AM-PAC score of 18 or greater is typically associated with a discharge to the patient's home setting. Based on the patient's AM-PAC score, and their current ADL deficits, it is recommended that the patient have 2-3 sessions per week of Occupational Therapy at d/c to increase the patient's independence. At this time, this patient demonstrates the endurance and safety to discharge home with initial 24 assist and a home OT follow up treatment frequency of 2-3x/wk. Please see assessment section for further patient specific details. If patient discharges prior to next session this note will serve as a discharge summary. Please see below for the latest assessment towards goals. Patient Diagnosis(es): The primary encounter diagnosis was HOLLI (acute kidney injury) ([facility] [address] Diagnoses of Acute respiratory failure with hypoxia (HCC) and Acute on chronic congestive heart failure, unspecified heart failure type [facility]) were also pertinent to this visit. Past Medical History:  has a past medical history of Cancer ([facility] [address] CHF (congestive heart failure) ([facility] [address] and TIA (transient ischemic attack). Past Surgical History:  has no past surgical history on file. Treatment Diagnosis: decreased endurance and fxl activity tolerance      Assessment   Performance deficits / Impairments: Decreased functional mobility ; Decreased strength;Decreased endurance;Decreased ADL status; Decreased balance  Assessment: Pt making progress towards goals.  Pt with improved fxl transfers with CGA, and increased standing tolerance to complete fxl mobility further distances with RW and placement    Vision  Vision: Impaired  Vision Exceptions: Wears glasses at all times  Hearing  Hearing: Exceptions to [employer]  Hearing Exceptions: Hard of hearing/hearing concerns    Cognition  Overall Cognitive Status: Exceptions  Arousal/Alertness: Appropriate responses to stimuli  Following Commands: Follows one step commands with increased time  Attention Span: Attends with cues to redirect  Safety Judgement: Decreased awareness of need for safety  Problem Solving: Decreased awareness of errors;Assistance required to generate solutions  Insights: Decreased awareness of deficits  Sequencing: Requires cues for some    Functional Mobility Circuit Training: ~80 ft, ~100 ft with RW and CGA.   Static Sitting Balance Exercises: Supervision seated EOB  Dynamic Sitting Balance Exercises: SBA on couch during LB dressing  Static Standing Balance Exercises: SBA at sink for grooming  Dynamic Standing Balance Exercises: CGA for fxl mobility with RW    Education Given To: Patient  Education Provided: Role of Therapy;Plan of Care;ADL Adaptive Strategies;Transfer Training  Education Method: Verbal  Barriers to Learning: Cognition  Education Outcome: Continued education needed;Verbalized understanding                        AM-PAC Score        AM-[facility] Inpatient Daily Activity Raw Score: 19 (03/21/23 1407)  AM-PAC Inpatient ADL T-Scale Score : 40.22 (03/21/23 1407)  ADL Inpatient CMS 0-100% Score: 42.8 (03/21/23 1407)  ADL Inpatient CMS G-Code Modifier : CK (03/21/23 1407)        Goals  Short Term Goals  Time Frame for Short Term Goals: prior to d/c: status goals 3/21: all goals ongoing  Short Term Goal 1: Supervision for functional transfers/functional mobility to complete ADL task  Short Term Goal 2: Supervision LB dressing  Short Term Goal 3: Supervision toileting  Short Term Goal 4: Supervision to complete grooming task in stance  Short Term Goal 5: Supervision LB bathing  Long Term Goals  Time Frame for Long Term Goals :

## 2023-03-21 NOTE — CARE COORDINATION
Discharge Planning:  TARA spoke with Bret Garcia with [facility] [phone] who verified that [facility] will be able to accept this pt for [facility] services including PT/OT and RN for the CHF program.      Plan: Home with resumption of palliative Care through [facility] and [facility] to be initiated with [facility] for RN (CHF management) and PT/OT.   Son will transport pt home at d/c.  TRAY Vera  [phone]  Electronically signed by MARCELO Avila on 3/21/2023 at 10:23 AM

## 2023-03-21 NOTE — PROGRESS NOTES
appropriate, normal insight  Capillary Refill: Brisk,< 3 seconds   Peripheral Pulses: +2 palpable, equal bilaterally       Labs:   Recent Labs     03/19/23  1035 03/20/23  0424   WBC 5.6 4.7   HGB 14.2 14.5   HCT 43.6 44.4    165     Recent Labs     03/19/23  1035 03/20/23  0424 03/21/23  0516   * 146* 145   K 4.3 4.4 4.1   * 111* 109   CO2 22 23 21   BUN 65* 61* 48*   CREATININE 2.9* 2.8* 2.5*   CALCIUM 10.7* 10.5 10.5     Recent Labs     03/19/23  1035   AST 21   ALT 20   BILITOT 0.7   ALKPHOS 62     No results for input(s): INR in the last 72 hours. Recent Labs     03/20/23  1623 03/20/23  2140 03/21/23  0516   TROPONINI 0.14* 0.16* 0.17*       Urinalysis:      Lab Results   Component Value Date/Time    NITRU Negative 01/03/2023 02:42 PM    WBCUA 4 12/18/2022 02:00 AM    BACTERIA Rare 12/18/2022 02:00 AM    RBCUA 186 12/18/2022 02:00 AM    BLOODU Negative 01/03/2023 02:42 PM    SPECGRAV 1.012 01/03/2023 02:42 PM    GLUCOSEU Negative 01/03/2023 02:42 PM    GLUCOSEU NEGATIVE 09/03/2011 07:10 PM       Radiology:  XR CHEST (2 VW)   Final Result   CHF with mild pulmonary edema. Assessment/Plan:    Active Hospital Problems    Diagnosis Date Noted    CHF (congestive heart failure), NYHA class I, acute on chronic, combined [facility] [I50.43] 03/19/2023     Priority: Medium     Hospital course: a 80 y.o. male with paf, hfref, ckd IIIb, chronic troponin elevation, gout, who presents to [facility] with sob. He drove himself to his eye dr and was found to be hypotensive, hypoxemic there and sent to the ed. He has had eye irritation for the past week but no exam was done in their office.       ED workup  Vitals: 74% on ra -> high 90s on 2L, bp soft down to 98/77, afebrile  Pertinent labs: Na 148, bun/crt 65/2.9, bnp 53K, vbg wnl  Imaging: cxr; CHF with mild pulmonary edema     Plan:  Acute on chronic systolic, diastolic chf exacerbation   - last ECHO:  12/22 lvef 15% severe global hypokinesis, ddII, severe la dilation, rv severely enlarged and hypokinetic  - bnp 53K, repeat tomorrow  - stop lasix now due to crt increased  - hold  BB, ACEi/ARB/arni due to hypotension, holli  - daily wts  - I/Os  - consult CHF RN  - consulted palliative medicine     Acute hypoxic and hypercarbic respiratory failure, POA  - so2 < 90% on ra, rr> 18  - supplemental o2 to maintain so2 > 90%     HOLLI ckd III6  - crt baseline 2.1 - 2.3, now 2.9 -> 2.8  - Avoid nephrotoxins     Hypothyroid  - tsh 9.13, ft4 1, ft3 1.3  - start synthroid 75 mcg daily and repeat tsh in 6 weeks     Chronic conditions - continue home meds unless otherwise stated  Paf  Gout     DVT Prophylaxis: warfarin/DOAC  Diet: ADULT DIET; Regular;  No Added Salt (3-4 gm); 2000 ml  Code Status: DNR-CC    PT/OT Eval Status: home PT    Dispo - Andrew Ho MD

## 2023-03-21 NOTE — PLAN OF CARE
Problem: Discharge Planning  Goal: Discharge to home or other facility with appropriate resources  Outcome: Progressing     Problem: Safety - Adult  Goal: Free from fall injury  Outcome: Progressing     Problem: ABCDS Injury Assessment  Goal: Absence of physical injury  Outcome: Progressing     Problem: Respiratory - Adult  Goal: Achieves optimal ventilation and oxygenation  Outcome: Progressing     Problem: Cardiovascular - Adult  Goal: Maintains optimal cardiac output and hemodynamic stability  Outcome: Progressing  Goal: Absence of cardiac dysrhythmias or at baseline  Outcome: Progressing     Problem: Metabolic/Fluid and Electrolytes - Adult  Goal: Electrolytes maintained within normal limits  Outcome: Progressing  Goal: Hemodynamic stability and optimal renal function maintained  Outcome: Progressing  Goal: Glucose maintained within prescribed range  Outcome: Progressing     Problem: Neurosensory - Adult  Goal: Achieves stable or improved neurological status  Outcome: Progressing  Goal: Absence of seizures  Outcome: Progressing  Goal: Remains free of injury related to seizures activity  Outcome: Progressing  Goal: Achieves maximal functionality and self care  Outcome: Progressing     Problem: Skin/Tissue Integrity - Adult  Goal: Skin integrity remains intact  Outcome: Progressing  Goal: Incisions, wounds, or drain sites healing without S/S of infection  Outcome: Progressing  Goal: Oral mucous membranes remain intact  Outcome: Progressing     Problem: Musculoskeletal - Adult  Goal: Return mobility to safest level of function  Outcome: Progressing  Goal: Maintain proper alignment of affected body part  Outcome: Progressing  Goal: Return ADL status to a safe level of function  Outcome: Progressing     Problem: Genitourinary - Adult  Goal: Absence of urinary retention  Outcome: Progressing     Problem: Infection - Adult  Goal: Absence of infection at discharge  Outcome: Progressing  Goal: Absence of infection

## 2023-03-21 NOTE — PLAN OF CARE
Adult  Goal: Skin integrity remains intact  3/21/2023 1557 by Leif Real RN  Outcome: Progressing  Flowsheets  Taken 3/21/2023 1101 by Leif Real RN  Skin Integrity Remains Intact:   Monitor for areas of redness and/or skin breakdown   Assess vascular access sites hourly  Taken 3/21/2023 0800 by Leif Real RN  Skin Integrity Remains Intact:   Monitor for areas of redness and/or skin breakdown   Assess vascular access sites hourly  Taken 3/21/2023 0337 by Amberly Lopez RN  Skin Integrity Remains Intact:   Monitor for areas of redness and/or skin breakdown   Assess vascular access sites hourly  3/21/2023 0335 by Amberly Lopez RN  Outcome: Progressing  Flowsheets (Taken 3/20/2023 2125)  Skin Integrity Remains Intact:   Monitor for areas of redness and/or skin breakdown   Assess vascular access sites hourly  Goal: Incisions, wounds, or drain sites healing without S/S of infection  3/21/2023 1557 by Leif Real RN  Outcome: Progressing  Flowsheets (Taken 3/21/2023 1101)  Incisions, Wounds, or Drain Sites Healing Without Sign and Symptoms of Infection: TWICE DAILY: Assess and document skin integrity  3/21/2023 0335 by Amberly Lopez RN  Outcome: Progressing  Flowsheets (Taken 3/20/2023 2125)  Incisions, Wounds, or Drain Sites Healing Without Sign and Symptoms of Infection: TWICE DAILY: Assess and document dressing/incision, wound bed, drain sites and surrounding tissue  Goal: Oral mucous membranes remain intact  3/21/2023 1557 by Leif Real RN  Outcome: Progressing  Flowsheets (Taken 3/21/2023 0800)  Oral Mucous Membranes Remain Intact: Assess oral mucosa and hygiene practices  3/21/2023 0335 by Amberly Lopez RN  Outcome: Progressing  Flowsheets (Taken 3/20/2023 2125)  Oral Mucous Membranes Remain Intact: Assess oral mucosa and hygiene practices     Problem: Musculoskeletal - Adult  Goal: Return mobility to safest level of function  3/21/2023 1557 by Leif Real RN  Outcome: comfort level:   Encourage patient to monitor pain and request assistance   Assess pain using appropriate pain scale   Administer analgesics based on type and severity of pain and evaluate response   Implement non-pharmacological measures as appropriate and evaluate response   Consider cultural and social influences on pain and pain management   Notify Licensed Independent Practitioner if interventions unsuccessful or patient reports new pain  3/21/2023 0335 by Tracie Miller RN  Outcome: Progressing

## 2023-03-21 NOTE — PROGRESS NOTES
Physical Therapy  Facility/Department: [address] MED SURG  Physical Therapy Daily Note  If patient discharges prior to next session this note will serve as a discharge summary. Please see below for the latest assessment towards goals. Name: Siva Adams  : 1930  MRN: [id number]  Date of Service: 3/21/2023    Discharge Recommendations:  24 hour supervision or assist, Home with Home health PT, Patient would benefit from continued therapy after discharge   Siva Adams scored a 19/24 on the AM-PAC short mobility form. Current research shows that an AM-PAC score of 18 or greater is typically associated with a discharge to the patient's home setting. Based on the patient's AM-PAC score and their current functional mobility deficits, it is recommended that the patient have 2-3 sessions per week of Physical Therapy at d/c to increase the patient's independence. At this time, this patient demonstrates the endurance and safety to discharge home with initial 24/7 and home PT (home vs OP services) and a follow up treatment frequency of 2-3x/wk. Please see assessment section for further patient specific details. PT Equipment Recommendations  Equipment Needed: No  Other: Has a FWW to use at home. Patient Diagnosis(es): The primary encounter diagnosis was HOLLI (acute kidney injury) ([facility] [address] Diagnoses of Acute respiratory failure with hypoxia (HCC) and Acute on chronic congestive heart failure, unspecified heart failure type [facility]) were also pertinent to this visit. Past Medical History:  has a past medical history of Cancer ([address] CHF (congestive heart failure) ([address] and TIA (transient ischemic attack). Past Surgical History:  has no past surgical history on file. Assessment   Assessment: Today, the pt demonstrated some improvement in activity tolerance and levels of assist but con't to be limited by his generalized weakness and need for supplemental O2.  The pt requiring CGA/SBA for transfers with 'sfor safe Objective        Observation/Palpation  Observation: multiple brown skin lesions, L ear deformity d/t skin cancer removal                       Bed mobility  Supine to Sit: Supervision  Sit to Supine: Supervision  Scooting: Independent  Transfers  Sit to Stand: Contact guard assistance;Stand by assistance  Stand to Sit: Contact guard assistance  Comment: vc's for hand placement  Ambulation  Surface: Level tile  Device: Rolling Walker  Assistance: Contact guard assistance  Quality of Gait: L foot drag but able to clear adequately and step lengths appear to be equal; the pt has a slight forward lean and no LOB noted  Distance: 80 feet x 1, 100 feet x 1  More Ambulation?: No  Stairs/Curb  Stairs?: No     Balance  Sitting - Static: Good  Sitting - Dynamic: Good  Standing - Static: Good;-  Standing - Dynamic: Good; - (with walker)  Comments: static standing at the sink and while pulling up pants with CGA           AM-PAC Score  AM-PAC Inpatient Mobility Raw Score : 19 (03/21/23 [facility])  AM-PAC Inpatient T-Scale Score : 45.44 (03/21/23 [facility])  Mobility Inpatient CMS 0-100% Score: 41.77 (03/21/23 [facility])  Mobility Inpatient CMS G-Code Modifier : CK (03/21/23 [facility])       Goals  Short Term Goals  Time Frame for Short Term Goals: By d/c  (progressing toward goals)  Short Term Goal 1: Supine to/from sit from flat bed with Mod I  Short Term Goal 2: Sit to/from stand with Mod I  Short Term Goal 3: Amb.  x 100' with FWW with Mod I  Short Term Goal 4: Up and down 2 steps with rail and Supervision  Patient Goals   Patient Goals : Return home       Education  Patient Education  Education Given To: Patient  Education Provided: Role of Therapy;Plan of Care;Transfer Training;Energy Conservation  Education Method: Verbal  Barriers to Learning: Hearing  Education Outcome: Demonstrated understanding;Continued education needed      Therapy Time   Individual Concurrent Group Co-treatment   Time In 1330         Time Out 1409         Minutes 39

## 2023-03-22 LAB
ANION GAP SERPL CALCULATED.3IONS-SCNC: 12 MMOL/L (ref 3–16)
BACTERIA URNS QL MICRO: NORMAL /HPF
BILIRUB UR QL STRIP.AUTO: NEGATIVE
BUN SERPL-MCNC: 51 MG/DL (ref 7–20)
CALCIUM SERPL-MCNC: 10.4 MG/DL (ref 8.3–10.6)
CHLORIDE SERPL-SCNC: 112 MMOL/L (ref 99–110)
CLARITY UR: CLEAR
CO2 SERPL-SCNC: 22 MMOL/L (ref 21–32)
COLOR UR: YELLOW
CREAT SERPL-MCNC: 2.8 MG/DL (ref 0.8–1.3)
EPI CELLS #/AREA URNS AUTO: 1 /HPF (ref 0–5)
GFR SERPLBLD CREATININE-BSD FMLA CKD-EPI: 20 ML/MIN/{1.73_M2}
GLUCOSE SERPL-MCNC: 132 MG/DL (ref 70–99)
GLUCOSE UR STRIP.AUTO-MCNC: NEGATIVE MG/DL
HGB UR QL STRIP.AUTO: NEGATIVE
HYALINE CASTS #/AREA URNS AUTO: 6 /LPF (ref 0–8)
KETONES UR STRIP.AUTO-MCNC: NEGATIVE MG/DL
LEUKOCYTE ESTERASE UR QL STRIP.AUTO: NEGATIVE
MAGNESIUM SERPL-MCNC: 2.3 MG/DL (ref 1.8–2.4)
NITRITE UR QL STRIP.AUTO: NEGATIVE
NT-PROBNP SERPL-MCNC: ABNORMAL PG/ML (ref 0–449)
PH UR STRIP.AUTO: 5 [PH] (ref 5–8)
POTASSIUM SERPL-SCNC: 4.3 MMOL/L (ref 3.5–5.1)
PROT UR STRIP.AUTO-MCNC: 30 MG/DL
RBC CLUMPS #/AREA URNS AUTO: 0 /HPF (ref 0–4)
SODIUM SERPL-SCNC: 146 MMOL/L (ref 136–145)
SP GR UR STRIP.AUTO: 1.02 (ref 1–1.03)
UA DIPSTICK W REFLEX MICRO PNL UR: YES
URN SPEC COLLECT METH UR: ABNORMAL
UROBILINOGEN UR STRIP-ACNC: 1 E.U./DL
WBC #/AREA URNS AUTO: 1 /HPF (ref 0–5)

## 2023-03-22 PROCEDURE — 36415 COLL VENOUS BLD VENIPUNCTURE: CPT

## 2023-03-22 PROCEDURE — 81001 URINALYSIS AUTO W/SCOPE: CPT

## 2023-03-22 PROCEDURE — 1200000000 HC SEMI PRIVATE

## 2023-03-22 PROCEDURE — 6370000000 HC RX 637 (ALT 250 FOR IP): Performed by: FAMILY MEDICINE

## 2023-03-22 PROCEDURE — 97116 GAIT TRAINING THERAPY: CPT

## 2023-03-22 PROCEDURE — 83735 ASSAY OF MAGNESIUM: CPT

## 2023-03-22 PROCEDURE — 97535 SELF CARE MNGMENT TRAINING: CPT

## 2023-03-22 PROCEDURE — 97530 THERAPEUTIC ACTIVITIES: CPT

## 2023-03-22 PROCEDURE — 94760 N-INVAS EAR/PLS OXIMETRY 1: CPT

## 2023-03-22 PROCEDURE — 2700000000 HC OXYGEN THERAPY PER DAY

## 2023-03-22 PROCEDURE — 2580000003 HC RX 258: Performed by: FAMILY MEDICINE

## 2023-03-22 PROCEDURE — 83880 ASSAY OF NATRIURETIC PEPTIDE: CPT

## 2023-03-22 PROCEDURE — 80048 BASIC METABOLIC PNL TOTAL CA: CPT

## 2023-03-22 RX ORDER — ALLOPURINOL 100 MG/1
100 TABLET ORAL DAILY
Status: DISCONTINUED | OUTPATIENT
Start: 2023-03-23 | End: 2023-03-25 | Stop reason: HOSPADM

## 2023-03-22 RX ADMIN — SODIUM CHLORIDE, PRESERVATIVE FREE 10 ML: 5 INJECTION INTRAVENOUS at 08:20

## 2023-03-22 RX ADMIN — ALLOPURINOL 200 MG: 100 TABLET ORAL at 08:17

## 2023-03-22 RX ADMIN — ASPIRIN 81 MG 81 MG: 81 TABLET ORAL at 08:17

## 2023-03-22 RX ADMIN — FLUTICASONE PROPIONATE 2 SPRAY: 50 SPRAY, METERED NASAL at 08:18

## 2023-03-22 RX ADMIN — APIXABAN 2.5 MG: 2.5 TABLET, FILM COATED ORAL at 08:17

## 2023-03-22 RX ADMIN — TAMSULOSIN HYDROCHLORIDE 0.4 MG: 0.4 CAPSULE ORAL at 21:21

## 2023-03-22 RX ADMIN — LEVOTHYROXINE SODIUM 75 MCG: 0.07 TABLET ORAL at 06:08

## 2023-03-22 RX ADMIN — APIXABAN 2.5 MG: 2.5 TABLET, FILM COATED ORAL at 21:21

## 2023-03-22 RX ADMIN — SODIUM CHLORIDE, PRESERVATIVE FREE 10 ML: 5 INJECTION INTRAVENOUS at 21:22

## 2023-03-22 ASSESSMENT — PAIN SCALES - GENERAL: PAINLEVEL_OUTOF10: 0

## 2023-03-22 NOTE — PLAN OF CARE
Problem: Discharge Planning  Goal: Discharge to home or other facility with appropriate resources  3/21/2023 2007 by Mohan Palacio RN  Outcome: Progressing  3/21/2023 1557 by Brendan Cisneros RN  Outcome: Progressing  Flowsheets (Taken 3/21/2023 0800)  Discharge to home or other facility with appropriate resources: Identify barriers to discharge with patient and caregiver     Problem: Safety - Adult  Goal: Free from fall injury  3/21/2023 2007 by Mohan Palacio RN  Outcome: Progressing  3/21/2023 1557 by Brendan Cisneros RN  Outcome: Progressing     Problem: ABCDS Injury Assessment  Goal: Absence of physical injury  3/21/2023 2007 by Mohan Palacio RN  Outcome: Progressing  3/21/2023 1557 by Brendan Cisneros RN  Outcome: Progressing     Problem: Respiratory - Adult  Goal: Achieves optimal ventilation and oxygenation  3/21/2023 2007 by Mohan Palacio RN  Outcome: Progressing  3/21/2023 1557 by Brendan Cisneros RN  Outcome: Progressing     Problem: Cardiovascular - Adult  Goal: Maintains optimal cardiac output and hemodynamic stability  3/21/2023 2007 by Mohan Palacio RN  Outcome: Progressing  3/21/2023 1557 by Brendan Cisneros RN  Outcome: Progressing  Flowsheets (Taken 3/21/2023 0800)  Maintains optimal cardiac output and hemodynamic stability: Monitor blood pressure and heart rate  Goal: Absence of cardiac dysrhythmias or at baseline  3/21/2023 2007 by Mohan Palacio RN  Outcome: Progressing  3/21/2023 1557 by Brendan Cisneros RN  Outcome: Progressing     Problem: Metabolic/Fluid and Electrolytes - Adult  Goal: Electrolytes maintained within normal limits  3/21/2023 2007 by Mohan Palacio RN  Outcome: Progressing  3/21/2023 1557 by Brendan Cisneros RN  Outcome: Progressing  Flowsheets (Taken 3/21/2023 0800)  Electrolytes maintained within normal limits: Monitor labs and assess patient for signs and symptoms of electrolyte imbalances  Goal:

## 2023-03-22 NOTE — PLAN OF CARE
Adult  Goal: Absence of urinary retention  Outcome: Progressing     Problem: Infection - Adult  Goal: Absence of infection at discharge  Outcome: Progressing  Goal: Absence of infection during hospitalization  Outcome: Progressing  Goal: Absence of fever/infection during anticipated neutropenic period  Outcome: Progressing     Problem: Pain  Goal: Verbalizes/displays adequate comfort level or baseline comfort level  Outcome: Progressing

## 2023-03-22 NOTE — PROGRESS NOTES
last ECHO:  12/22 lvef 15% severe global hypokinesis, ddII, severe la dilation, rv severely enlarged and hypokinetic  - stop lasix now due to crt increased  - hold  BB, ACEi/ARB/arni due to hypotension, holli  - daily wts  - I/Os  - consult CHF RN  - consulted palliative medicine  - nephrology consulted  - no SGLT2 inhibitor and aldosterone inhibitor due to HOLLI; no ICD due to code status/goals of care     Acute hypoxic and hypercarbic respiratory failure, POA  - so2 < 90% on ra, rr> 18  - supplemental o2 to maintain so2 > 90%     HOLLI ckd III6  - crt baseline 2.1 - 2.3, now 2.9 -> 2.8  - Avoid nephrotoxins     Hypothyroid  - tsh 9.13, ft4 1, ft3 1.3  - start synthroid 75 mcg daily and repeat tsh in 6 weeks     Chronic conditions - continue home meds unless otherwise stated  Paf  Gout     DVT Prophylaxis: warfarin/DOAC  Diet: ADULT DIET; Regular;  No Added Salt (3-4 gm); 2000 ml  Code Status: DNR-CC    PT/OT Eval Status: home PT    Dispo - Orin Thomson MD

## 2023-03-22 NOTE — PROGRESS NOTES
SBA/CGA, and increased standing tolerance to complete fxl mobility further distances with RW and SBA/CGA while maintaining O2 sats >90% on RA. Pt SBA for grooming, and anticipate pt would require overall SBA to min A for ADLs. Cont per OT POC. Recommend initial 24/7 assist and home OT to ensure safe transition home. Treatment Diagnosis: decreased endurance and fxl activity tolerance  Prognosis: Fair  History: see above  REQUIRES OT FOLLOW-UP: Yes  Activity Tolerance  Activity Tolerance: Patient Tolerated treatment well  Activity Tolerance Comments: SpO2 on 2 L O2 96%, on 1 L O2 94%, on RA 94-95% with exertion. Left on RA and RN aware. Plan   Occupational Therapy Plan  Times Per Week: 3-5x  Current Treatment Recommendations: Strengthening, Functional mobility training, Balance training, Endurance training, Equipment evaluation, education, & procurement, Patient/Caregiver education & training, Safety education & training, Self-Care / ADL     Restrictions  Restrictions/Precautions  Restrictions/Precautions: Fall Risk  Required Braces or Orthoses?: No  Position Activity Restriction  Other position/activity restrictions: 2L O2 initially and then weaned to room air, 2000ml fluid restriction    Subjective   General  Chart Reviewed: Yes  Patient assessed for rehabilitation services?: Yes  Additional Pertinent Hx: Per H&P on 3/19/23, \"The patient is a 80 y.o. male with paf, hfref, ckd IIIb, chronic troponin elevation, gout, who presents to [facility] with sob. He drove himself to his eye dr and was found to be hypotensive, hypoxemic there and sent to the ed. He has had eye irritation for the past week but no exam was done in their office. \"  Family / Caregiver Present: Yes (son and DIL)  Referring Practitioner: Diaz Camargo DO  Diagnosis: CHF, SOB  Subjective  Subjective: Pt met b/s for OT tx. Pt seated in recliner on arrival, agreeable to participate in therapy, requesting to walk. Pt with no complaints.

## 2023-03-22 NOTE — PROGRESS NOTES
the pt will con't to progress and be safe for home with initial 24/7 and home PT. Will con't to follow. Treatment Diagnosis: L LE weakness and decreased endurance  Therapy Prognosis: Good  Barriers to Learning: [place]  Requires PT Follow-Up: Yes  Activity Tolerance  Activity Tolerance Comments: SpO2 variable from 92% > 100% on 2L O2 > room air     Plan   Physcial Therapy Plan  General Plan: 3-5 times per week  Current Treatment Recommendations: Strengthening, Balance training, Functional mobility training, Transfer training, Neuromuscular re-education, Gait training, Stair training, Equipment evaluation, education, & procurement, Patient/Caregiver education & training, Safety education & training, Home exercise program, Therapeutic activities, Endurance training  Safety Devices  Type of Devices: Call light within reach, Chair alarm in place, Gait belt, Patient at risk for falls, Left in chair, Nurse notified  Restraints  Restraints Initially in Place: No     Restrictions  Restrictions/Precautions  Restrictions/Precautions: Fall Risk  Required Braces or Orthoses?: No  Position Activity Restriction  Other position/activity restrictions: 2L O2 initially and then weaned to room air, 2000ml fluid restriction     Subjective   General  Chart Reviewed: Yes  Additional Pertinent Hx: The patient is a 80 y.o. male with paf, hfref, ckd IIIb, chronic troponin elevation, gout, who presents to [facility] with sob. He drove himself to his eye dr and was found to be hypotensive, hypoxemic there and sent to the ed. Response To Previous Treatment: Patient with no complaints from previous session.   Family / Caregiver Present: Yes (son and d-i-l in the room)  Referring Practitioner: Dr. Agustin Garcia  Referral Date : 03/20/23  Diagnosis: CHF  Follows Commands: Within Functional Limits  Subjective  Subjective: the pt found to be in the chair upon arrival; the pt was agreeable to therapy         Social/Functional History  Social/Functional (03/22/23 1543)  Mobility Inpatient CMS 0-100% Score: 41.77 (03/22/23 1543)  Mobility Inpatient CMS G-Code Modifier : CK (03/22/23 1543)            Goals  Short Term Goals  Time Frame for Short Term Goals: By d/c  (progressing toward goals)  Short Term Goal 1: Supine to/from sit from flat bed with Mod I  Short Term Goal 2: Sit to/from stand with Mod I  Short Term Goal 3: Amb.  x 100' with FWW with Mod I  Short Term Goal 4: Up and down 2 steps with rail and Supervision  Patient Goals   Patient Goals : Return home       Education  Patient Education  Education Given To: Patient  Education Provided: Role of Therapy;Plan of Care;Transfer Training;Energy Conservation  Education Method: Verbal  Barriers to Learning: Hearing  Education Outcome: Demonstrated understanding;Continued education needed      Therapy Time   Individual Concurrent Group Co-treatment   Time In 1515         Time Out 1554         Minutes 39                 Electronically signed by Trey Brothers, PT [id number] on 3/22/2023 at 3:56 PM

## 2023-03-22 NOTE — CONSULTS
diarrhea. Denies any chest pain, but  has some shortness of breath and dyspnea on exertion (improving). Feeling weak, tired, and decreased level of energy. Admit to poor  appetite. PHYSICAL EXAMINATION:  GENERAL:  He is lying in bed, looks comfortable. VITAL SIGNS:  Blood pressure 103/71, pulse 107, temperature 97.8. HEENT:  Pupils reactive to light. CHEST:  Few scattered rhonchi. CARDIOVASCULAR:  S1 and S2 audible. Irregular rate and rhythm. ABDOMEN:  Soft, nontender. Positive bowel sounds. EXTREMITIES:  1+ edema. CNS:  Nonfocal.    LABORATORY DATA:  Sodium 146, potassium 4.3, chloride 112, bicarb 22,  BUN 51, creatinine 2.8, magnesium 2.3, lactic acid 2.5, BNP 51,000,  albumin 3.3, ALT 20, amylase 29, AST 21, lipase 26. WBC 4.7, hemoglobin  14.5. IMPRESSION:  1. Acute kidney injury, most likely secondary to ATN, number 1 etiology  cardiorenal, number 2 hypotension, 3 medication (Diovan). 2.  Shortness of breath, most likely congestive heart failure improving. 3.  Hypotension. 4.  Congestive heart failure with ejection fraction of 15%. Overall  prognosis is guarded to poor. The patient is a poor dialysis candidate. We will follow. Thank you very much.         Rajiv Chon MD    D: 03/22/2023 10:33:21       T: 03/22/2023 15:49:58     [email or web address]  Job#: [id number]     Doc#: [id number]    CC:

## 2023-03-22 NOTE — PROGRESS NOTES
Department of Internal Medicine  Nephrology Progress Note    SUBJECTIVE:  We are following this patient for HOLLI . Patient progress reviewed. REVIEW OF SYSTEMS:  No CP, some SOB/QUIJANO     Physical Exam:    VITALS:  /71   Pulse (!) 107   Temp 97.8 °F (36.6 °C) (Oral)   Resp 18   Ht 5' 10\" (1.778 m)   Wt 155 lb 3.3 oz (70.4 kg)   SpO2 91%   BMI 22.27 kg/m²   24HR INTAKE/OUTPUT:    Intake/Output Summary (Last 24 hours) at 3/22/2023 1231  Last data filed at 3/22/2023 0509  Gross per 24 hour   Intake 520 ml   Output 450 ml   Net 70 ml       Constitutional:  Resting   Respiratory:  few rhonchi   Gastrointestinal:  No tenderness.   Normal Bowel Sounds  Cardiovascular:  S1, S2 RRR   Edema:  +  edema    DATA:    CBC:  Lab Results   Component Value Date/Time    WBC 4.7 03/20/2023 04:24 AM    RBC 4.48 03/20/2023 04:24 AM    HGB 14.5 03/20/2023 04:24 AM    HCT 44.4 03/20/2023 04:24 AM    MCV 99.2 03/20/2023 04:24 AM    MCH 32.5 03/20/2023 04:24 AM    MCHC 32.7 03/20/2023 04:24 AM    RDW 18.2 03/20/2023 04:24 AM     03/20/2023 04:24 AM    MPV 8.7 03/20/2023 04:24 AM     CMP:  Lab Results   Component Value Date/Time     03/22/2023 06:51 AM    K 4.3 03/22/2023 06:51 AM    K 4.9 04/28/2021 07:32 PM     03/22/2023 06:51 AM    CO2 22 03/22/2023 06:51 AM    BUN 51 03/22/2023 06:51 AM    CREATININE 2.8 03/22/2023 06:51 AM    GFRAA 46 04/30/2021 06:34 AM    GFRAA 50 09/03/2011 07:00 PM    AGRATIO 1.0 03/19/2023 10:35 AM    LABGLOM 20 03/22/2023 06:51 AM    GLUCOSE 132 03/22/2023 06:51 AM    PROT 6.6 03/19/2023 10:35 AM    PROT 7.4 09/03/2011 07:00 PM    CALCIUM 10.4 03/22/2023 06:51 AM    BILITOT 0.7 03/19/2023 10:35 AM    ALKPHOS 62 03/19/2023 10:35 AM    AST 21 03/19/2023 10:35 AM    ALT 20 03/19/2023 10:35 AM      Hepatic Function Panel:   Lab Results   Component Value Date/Time    ALKPHOS 62 03/19/2023 10:35 AM    ALT 20 03/19/2023 10:35 AM    AST 21 03/19/2023 10:35 AM    PROT 6.6 03/19/2023 10:35 AM PROT 7.4 09/03/2011 07:00 PM    BILITOT 0.7 03/19/2023 10:35 AM      Phosphorus: No results found for: PHOS    ASSESSMENT:  Principal Problem:    CHF (congestive heart failure), NYHA class I, acute on chronic, combined ([facility] [address]  Resolved Problems:    * No resolved hospital problems. *      PLAN:  HOLLI most likely ATN from hypotension / meds ( ARBs)  and Cardio renal. Will do w/u . Off meds . Check PVR. CKD 3b HTN/CHF/Age   CHD compensated , off diuretics for now .    HTN controlled   Anemia will do w/u,   Plan dw nurse     Nicole Guerrero MD, [facility]

## 2023-03-23 ENCOUNTER — APPOINTMENT (OUTPATIENT)
Dept: GENERAL RADIOLOGY | Age: 88
DRG: 291 | End: 2023-03-23
Payer: MEDICARE

## 2023-03-23 LAB
ALBUMIN SERPL-MCNC: 3 G/DL (ref 3.4–5)
ANION GAP SERPL CALCULATED.3IONS-SCNC: 10 MMOL/L (ref 3–16)
BACTERIA BLD CULT ORG #2: NORMAL
BACTERIA BLD CULT: NORMAL
BUN SERPL-MCNC: 57 MG/DL (ref 7–20)
CALCIUM SERPL-MCNC: 10.4 MG/DL (ref 8.3–10.6)
CHLORIDE SERPL-SCNC: 108 MMOL/L (ref 99–110)
CO2 SERPL-SCNC: 24 MMOL/L (ref 21–32)
CREAT SERPL-MCNC: 2.7 MG/DL (ref 0.8–1.3)
DEPRECATED RDW RBC AUTO: 17.6 % (ref 12.4–15.4)
FERRITIN SERPL IA-MCNC: 113.9 NG/ML (ref 30–400)
GFR SERPLBLD CREATININE-BSD FMLA CKD-EPI: 21 ML/MIN/{1.73_M2}
GLUCOSE SERPL-MCNC: 147 MG/DL (ref 70–99)
HCT VFR BLD AUTO: 42.6 % (ref 40.5–52.5)
HGB BLD-MCNC: 13.7 G/DL (ref 13.5–17.5)
IRON SATN MFR SERPL: 22 % (ref 20–50)
IRON SERPL-MCNC: 45 UG/DL (ref 59–158)
MAGNESIUM SERPL-MCNC: 2.4 MG/DL (ref 1.8–2.4)
MCH RBC QN AUTO: 31.9 PG (ref 26–34)
MCHC RBC AUTO-ENTMCNC: 32.1 G/DL (ref 31–36)
MCV RBC AUTO: 99.4 FL (ref 80–100)
PHOSPHATE SERPL-MCNC: 3.8 MG/DL (ref 2.5–4.9)
PLATELET # BLD AUTO: 179 K/UL (ref 135–450)
PMV BLD AUTO: 8.3 FL (ref 5–10.5)
POTASSIUM SERPL-SCNC: 4.5 MMOL/L (ref 3.5–5.1)
RBC # BLD AUTO: 4.29 M/UL (ref 4.2–5.9)
SODIUM SERPL-SCNC: 142 MMOL/L (ref 136–145)
TIBC SERPL-MCNC: 209 UG/DL (ref 260–445)
WBC # BLD AUTO: 4.6 K/UL (ref 4–11)

## 2023-03-23 PROCEDURE — 83540 ASSAY OF IRON: CPT

## 2023-03-23 PROCEDURE — 83735 ASSAY OF MAGNESIUM: CPT

## 2023-03-23 PROCEDURE — 97110 THERAPEUTIC EXERCISES: CPT

## 2023-03-23 PROCEDURE — 97530 THERAPEUTIC ACTIVITIES: CPT

## 2023-03-23 PROCEDURE — 6360000002 HC RX W HCPCS: Performed by: INTERNAL MEDICINE

## 2023-03-23 PROCEDURE — 36415 COLL VENOUS BLD VENIPUNCTURE: CPT

## 2023-03-23 PROCEDURE — 6370000000 HC RX 637 (ALT 250 FOR IP): Performed by: INTERNAL MEDICINE

## 2023-03-23 PROCEDURE — 97535 SELF CARE MNGMENT TRAINING: CPT

## 2023-03-23 PROCEDURE — 6370000000 HC RX 637 (ALT 250 FOR IP): Performed by: FAMILY MEDICINE

## 2023-03-23 PROCEDURE — 85027 COMPLETE CBC AUTOMATED: CPT

## 2023-03-23 PROCEDURE — 82728 ASSAY OF FERRITIN: CPT

## 2023-03-23 PROCEDURE — 83550 IRON BINDING TEST: CPT

## 2023-03-23 PROCEDURE — 1200000000 HC SEMI PRIVATE

## 2023-03-23 PROCEDURE — 2580000003 HC RX 258: Performed by: FAMILY MEDICINE

## 2023-03-23 PROCEDURE — 80069 RENAL FUNCTION PANEL: CPT

## 2023-03-23 PROCEDURE — 71045 X-RAY EXAM CHEST 1 VIEW: CPT

## 2023-03-23 RX ORDER — FUROSEMIDE 10 MG/ML
20 INJECTION INTRAMUSCULAR; INTRAVENOUS ONCE
Status: COMPLETED | OUTPATIENT
Start: 2023-03-23 | End: 2023-03-23

## 2023-03-23 RX ADMIN — ALLOPURINOL 100 MG: 100 TABLET ORAL at 11:58

## 2023-03-23 RX ADMIN — FLUTICASONE PROPIONATE 2 SPRAY: 50 SPRAY, METERED NASAL at 11:10

## 2023-03-23 RX ADMIN — APIXABAN 2.5 MG: 2.5 TABLET, FILM COATED ORAL at 20:18

## 2023-03-23 RX ADMIN — LEVOTHYROXINE SODIUM 75 MCG: 0.07 TABLET ORAL at 05:30

## 2023-03-23 RX ADMIN — ACETAMINOPHEN 650 MG: 325 TABLET ORAL at 20:18

## 2023-03-23 RX ADMIN — APIXABAN 2.5 MG: 2.5 TABLET, FILM COATED ORAL at 11:58

## 2023-03-23 RX ADMIN — FUROSEMIDE 20 MG: 10 INJECTION, SOLUTION INTRAMUSCULAR; INTRAVENOUS at 12:58

## 2023-03-23 RX ADMIN — SODIUM CHLORIDE, PRESERVATIVE FREE 10 ML: 5 INJECTION INTRAVENOUS at 20:18

## 2023-03-23 RX ADMIN — ASPIRIN 81 MG 81 MG: 81 TABLET ORAL at 11:58

## 2023-03-23 RX ADMIN — TAMSULOSIN HYDROCHLORIDE 0.4 MG: 0.4 CAPSULE ORAL at 20:18

## 2023-03-23 RX ADMIN — SODIUM CHLORIDE, PRESERVATIVE FREE 10 ML: 5 INJECTION INTRAVENOUS at 12:11

## 2023-03-23 ASSESSMENT — PAIN SCALES - GENERAL: PAINLEVEL_OUTOF10: 0

## 2023-03-23 NOTE — PROGRESS NOTES
Department of Internal Medicine  Nephrology Progress Note    SUBJECTIVE:  We are following this patient for HOLLI . Patient progress reviewed. REVIEW OF SYSTEMS:  No CP, some SOB/QIUJANO     Physical Exam:    VITALS:  /79   Pulse (!) 107   Temp 98.1 °F (36.7 °C) (Oral)   Resp 20   Ht 5' 10\" (1.778 m)   Wt 156 lb 4.9 oz (70.9 kg)   SpO2 93%   BMI 22.43 kg/m²   24HR INTAKE/OUTPUT:    Intake/Output Summary (Last 24 hours) at 3/23/2023 1216  Last data filed at 3/23/2023 1211  Gross per 24 hour   Intake 123 ml   Output 550 ml   Net -427 ml         Constitutional:  Resting   Respiratory:  Decrease BS at bases / scattered rhonchi   Gastrointestinal:  No tenderness.   Normal Bowel Sounds  Cardiovascular:  S1, S2 RRR   Edema:  +  edema    DATA:    CBC:  Lab Results   Component Value Date/Time    WBC 4.6 03/23/2023 09:24 AM    RBC 4.29 03/23/2023 09:24 AM    HGB 13.7 03/23/2023 09:24 AM    HCT 42.6 03/23/2023 09:24 AM    MCV 99.4 03/23/2023 09:24 AM    MCH 31.9 03/23/2023 09:24 AM    MCHC 32.1 03/23/2023 09:24 AM    RDW 17.6 03/23/2023 09:24 AM     03/23/2023 09:24 AM    MPV 8.3 03/23/2023 09:24 AM     CMP:  Lab Results   Component Value Date/Time     03/23/2023 09:24 AM    K 4.5 03/23/2023 09:24 AM    K 4.9 04/28/2021 07:32 PM     03/23/2023 09:24 AM    CO2 24 03/23/2023 09:24 AM    BUN 57 03/23/2023 09:24 AM    CREATININE 2.7 03/23/2023 09:24 AM    GFRAA 46 04/30/2021 06:34 AM    GFRAA 50 09/03/2011 07:00 PM    AGRATIO 1.0 03/19/2023 10:35 AM    LABGLOM 21 03/23/2023 09:24 AM    GLUCOSE 147 03/23/2023 09:24 AM    PROT 6.6 03/19/2023 10:35 AM    PROT 7.4 09/03/2011 07:00 PM    CALCIUM 10.4 03/23/2023 09:24 AM    BILITOT 0.7 03/19/2023 10:35 AM    ALKPHOS 62 03/19/2023 10:35 AM    AST 21 03/19/2023 10:35 AM    ALT 20 03/19/2023 10:35 AM      Hepatic Function Panel:   Lab Results   Component Value Date/Time    ALKPHOS 62 03/19/2023 10:35 AM    ALT 20 03/19/2023 10:35 AM    AST 21 03/19/2023 10:35 AM

## 2023-03-23 NOTE — PROGRESS NOTES
Occupational Therapy  Facility/Department[facility]  Occupational Therapy Daily Treatment    Name: Zohra Hoyt  : 1930  MRN: [id number]  Date of Service: 3/23/2023    Discharge Recommendations:  24 hour supervision or assist, Home with Home health OT (initial 24 hour assist and home OT to ensure safe transition home)     Zohra Hoyt scored a 20/24 on the AM-PAC ADL Inpatient form. Current research shows that an AM-PAC score of 18 or greater is typically associated with a discharge to the patient's home setting. Based on the patient's AM-PAC score, and their current ADL deficits, it is recommended that the patient have 2-3 sessions per week of Occupational Therapy at d/c to increase the patient's independence. At this time, this patient demonstrates the endurance and safety to discharge home with [facility] OT L1 and a follow up treatment frequency of 2-3x/wk. Please see assessment section for further patient specific details. If patient discharges prior to next session this note will serve as a discharge summary. Please see below for the latest assessment towards goals. Patient Diagnosis(es): The primary encounter diagnosis was HOLLI (acute kidney injury) ([facility] Diagnoses of Acute respiratory failure with hypoxia (HCC) and Acute on chronic congestive heart failure, unspecified heart failure type [facility]) were also pertinent to this visit. Past Medical History:  has a past medical history of Cancer ([facility] CHF (congestive heart failure) ([facility] [facility] and TIA (transient ischemic attack). Past Surgical History:  has no past surgical history on file. Treatment Diagnosis: decreased endurance and fxl activity tolerance    Assessment   Performance deficits / Impairments: Decreased functional mobility ; Decreased strength;Decreased endurance;Decreased ADL status; Decreased balance  Assessment: Pt continues to make good progress towards goals.  Pt completed functional transfers SBA and functional mobility SBA/CGA Education Given To: Patient  Education Provided: Role of Therapy;Plan of Care;ADL Adaptive Strategies;Transfer Training  Education Method: Verbal  Barriers to Learning: Cognition; Hearing  Education Outcome: Continued education needed;Verbalized understanding        AM-PAC Score  AM-PAC Inpatient Daily Activity Raw Score: 20 (03/23/23 0833)  AM-PAC Inpatient ADL T-Scale Score : 42.03 (03/23/23 5843)  ADL Inpatient CMS 0-100% Score: 38.32 (03/23/23 5906)  ADL Inpatient CMS G-Code Modifier : CJ (03/23/23 3175)      Goals  Short Term Goals  Time Frame for Short Term Goals: prior to d/c; goals ongoing  Short Term Goal 1: Supervision for functional transfers/functional mobility to complete ADL task  Short Term Goal 2: Supervision LB dressing  Short Term Goal 3: Supervision toileting  Short Term Goal 4: Supervision to complete grooming task in stance  Short Term Goal 5: Supervision LB bathing  Long Term Goals  Time Frame for Long Term Goals : STGs=LTGs  Patient Goals   Patient goals :  To return home       Therapy Time   Individual Concurrent Group Co-treatment   Time In 0800         Time Out 0830         Minutes [address] [place] [address] , [place] [address]

## 2023-03-23 NOTE — PLAN OF CARE
Problem: Discharge Planning  Goal: Discharge to home or other facility with appropriate resources  3/22/2023 2009 by Sadi Galvez RN  Outcome: Progressing  3/22/2023 1008 by Misael Neal RN  Outcome: Progressing     Problem: Safety - Adult  Goal: Free from fall injury  3/22/2023 2009 by Sadi Galvez RN  Outcome: Progressing  3/22/2023 1008 by Misael Neal RN  Outcome: Progressing     Problem: ABCDS Injury Assessment  Goal: Absence of physical injury  3/22/2023 2009 by Sadi Galvez RN  Outcome: Progressing  3/22/2023 1008 by Misael Neal RN  Outcome: Progressing     Problem: Respiratory - Adult  Goal: Achieves optimal ventilation and oxygenation  3/22/2023 2009 by Sadi Galvez RN  Outcome: Progressing  3/22/2023 1008 by Misael Neal RN  Outcome: Progressing     Problem: Cardiovascular - Adult  Goal: Maintains optimal cardiac output and hemodynamic stability  3/22/2023 2009 by Sadi Galvez RN  Outcome: Progressing  3/22/2023 1008 by Misael Neal RN  Outcome: Progressing  Goal: Absence of cardiac dysrhythmias or at baseline  3/22/2023 2009 by Sadi Galvez RN  Outcome: Progressing  3/22/2023 1008 by Misael Neal RN  Outcome: Progressing     Problem: Metabolic/Fluid and Electrolytes - Adult  Goal: Electrolytes maintained within normal limits  3/22/2023 2009 by Sadi Galvez RN  Outcome: Progressing  3/22/2023 1008 by Misael Neal RN  Outcome: Progressing  Goal: Hemodynamic stability and optimal renal function maintained  3/22/2023 2009 by Sadi Galvez RN  Outcome: Progressing  3/22/2023 1008 by Misael Neal RN  Outcome: Progressing  Goal: Glucose maintained within prescribed range  3/22/2023 2009 by Sadi Galvez RN  Outcome: Progressing  3/22/2023 1008 by Misael Neal RN  Outcome: Progressing     Problem: Neurosensory - Adult  Goal: Achieves stable or improved neurological status  3/22/2023 2009 by Loree Marroquin RN  Outcome: Progressing  3/22/2023 1008 by Joshua Whitley RN  Outcome: Progressing  Goal: Absence of seizures  3/22/2023 2009 by Loree Marroquin RN  Outcome: Progressing  3/22/2023 1008 by Joshua Whitley RN  Outcome: Progressing  Goal: Remains free of injury related to seizures activity  3/22/2023 2009 by Loree Marroquin RN  Outcome: Progressing  3/22/2023 1008 by Joshua Whitley RN  Outcome: Progressing  Goal: Achieves maximal functionality and self care  3/22/2023 2009 by Loree Marroquin RN  Outcome: Progressing  3/22/2023 1008 by Joshua Whitley RN  Outcome: Progressing     Problem: Skin/Tissue Integrity - Adult  Goal: Skin integrity remains intact  3/22/2023 2009 by Loree Marroquin RN  Outcome: Progressing  3/22/2023 1008 by Joshua Whitley RN  Outcome: Progressing  Flowsheets  Taken 3/22/2023 1006  Skin Integrity Remains Intact:   Monitor for areas of redness and/or skin breakdown   Every 4-6 hours minimum: Change oxygen saturation probe site  Taken 3/22/2023 0800  Skin Integrity Remains Intact: Monitor for areas of redness and/or skin breakdown  Goal: Incisions, wounds, or drain sites healing without S/S of infection  3/22/2023 2009 by Loree Marroquin RN  Outcome: Progressing  3/22/2023 1008 by Joshua Whitley RN  Outcome: Progressing  Goal: Oral mucous membranes remain intact  3/22/2023 2009 by Loree Marroquin RN  Outcome: Progressing  3/22/2023 1008 by Joshua Whitley RN  Outcome: Progressing     Problem: Musculoskeletal - Adult  Goal: Return mobility to safest level of function  3/22/2023 2009 by Loree Marroquin RN  Outcome: Progressing  3/22/2023 1008 by Joshua Whitley RN  Outcome: Progressing  Goal: Maintain proper alignment of affected body part  3/22/2023 2009 by Loree Marroquin RN  Outcome: Progressing  3/22/2023 1008 by Joshua Whitley RN  Outcome: Progressing  Goal:

## 2023-03-23 NOTE — CONSULTS
[facility]  Palliative Care   Consult Note    NAME:  Kaitlynn Alberto  MEDICAL RECORD NUMBER:  [id number]  AGE: 80 y.o. GENDER: male  : 1930  TODAY'S DATE:  3/23/2023    Subjective     Reason for Consult:  goals of care  Visit Type: Initial Consult      Kaitlynn Alberto is a 80 y.o. male referred by:   [x] Physician    PAST MEDICAL HISTORY      Diagnosis Date    Cancer (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     TIA (transient ischemic attack)        PAST SURGICAL HISTORY  No past surgical history on file. FAMILY HISTORY  No family history on file. SOCIAL HISTORY  Social History     Tobacco Use    Smoking status: Every Day     Types: Pipe   Substance Use Topics    Alcohol use: No    Drug use: No       ALLERGIES  Allergies   Allergen Reactions    Adhesive Tape Rash       MEDICATIONS  No current facility-administered medications on file prior to encounter.      Current Outpatient Medications on File Prior to Encounter   Medication Sig Dispense Refill    torsemide (DEMADEX) 10 MG tablet Take 30 mg by mouth daily      apixaban (ELIQUIS) 2.5 MG TABS tablet Take 2.5 mg by mouth 2 times daily      metoprolol succinate (TOPROL XL) 25 MG extended release tablet Take 12.5 mg by mouth daily      tamsulosin (FLOMAX) 0.4 MG capsule Take 0.4 mg by mouth nightly      valsartan (DIOVAN) 40 MG tablet Take 40 mg by mouth daily      aspirin 81 MG chewable tablet Take 81 mg by mouth daily      allopurinol (ZYLOPRIM) 100 MG tablet Take 200 mg by mouth daily         Objective         BP (!) 95/49   Pulse (!) 103   Temp 97.6 °F (36.4 °C) (Oral)   Resp 16   Ht 5' 10\" (1.778 m)   Wt 156 lb 4.9 oz (70.9 kg)   SpO2 95%   BMI 22.43 kg/m²     Code Status: DNR-CC    Advanced Directives: completed his son Ludivina Hendrickson is [facility]      Assessment        Management and Education    Persons available for education:       [x] Self     [] Caregiver       [] Spouse       [x] Other Family Member   [] wants to continue to be admitted to the hospital as long as it can help. We discussed when he no longer wants to come back to hospital hospice is appropriate. I will continue to follow . Alejo Fought care as needed. Thank you for allowing me to participate in the care of Mr. Aleida Beck .      Electronically signed by Kelsi Chanel RN, BSN, [facility] on 3/23/2023 at 6:36 PM  [address] [place] [address]  Office: [phone]

## 2023-03-23 NOTE — PROGRESS NOTES
Physical Therapy  Facility/Department: [facility]  Physical Therapy Daily Treatment Note    Name: Misael Her  : 1930  MRN: [id number]  Date of Service: 3/23/2023    Discharge Recommendations: Misael Her scored a 18/24 on the AM-PAC short mobility form. Current research shows that an AM-PAC score of 18 or greater is typically associated with a discharge to the patient's home setting. Based on the patient's AM-PAC score and their current functional mobility deficits, it is recommended that the patient have 2-3 sessions per week of Physical Therapy at d/c to increase the patient's independence. At this time, this patient demonstrates the endurance and safety to discharge home with HHPT and 24 hour supervision/assistance and a follow up treatment frequency of 2-3x/wk. Please see assessment section for further patient specific details. If patient discharges prior to next session this note will serve as a discharge summary. Please see below for the latest assessment towards goals. HOME HEALTH CARE: LEVEL 3 SAFETY     - Initial home health evaluation to occur within 24-48 hours, in patient home   - Therapy evaluations in home within 24-48 hours of discharge; including DME and home safety   - Frontload therapy 5 days, then 3x a week   - Therapy to evaluate if patient has [address] needs for personal care   -  evaluation within 24-48 hours, includes evaluation of resources and insurance to determine [place], [place], LTC, and Medicaid options     24 hour supervision or assist, Home with Home health PT   PT Equipment Recommendations  Equipment Needed: No  Other: Has a FWW to use at home. Patient Diagnosis(es): The primary encounter diagnosis was HOLLI (acute kidney injury) ([facility] [address] Diagnoses of Acute respiratory failure with hypoxia (HCC) and Acute on chronic congestive heart failure, unspecified heart failure type [facility]) were also pertinent to this visit.   Past Medical History:  has a past Span: Attends with cues to redirect  Safety Judgement: Decreased awareness of need for safety  Insights: Decreased awareness of deficits  Initiation: Requires cues for some  Sequencing: Requires cues for some     Objective   Observation/Palpation  Observation: Pt on RA on arrival.      Bed mobility  Supine to Sit: Supervision (HOB elevated, increased time required to complete task, use of bed rail)  Sit to Supine: Supervision (HOB flat)  Scooting: Supervision (Toward HOB- laterally while sitting EOB)  Transfers  Comment: Unable to complete transfers due to hypotension  Ambulation  Comments: Unable to ambulate due to hypotension     Balance  Sitting - Static:  (Independent)  Sitting - Dynamic:  (Supervision)  Exercise Treatment: Pt participated in seated AROM therapeutic interventions including marching, LAQ, ankle plantarflexion, ankle dorsiflexion, hip abduction with manual resistance, and hip adduction with manual resistance (1 x 15 B). AM-PAC Score  AM-PAC Inpatient Mobility Raw Score : 18 (03/23/23 1410)  AM-PAC Inpatient T-Scale Score : 43.63 (03/23/23 1410)  Mobility Inpatient CMS 0-100% Score: 46.58 (03/23/23 1410)  Mobility Inpatient CMS G-Code Modifier : CK (03/23/23 1410)     Goals  Short Term Goals  Time Frame for Short Term Goals: By d/c  (progressing toward goals)  Short Term Goal 1: Supine to/from sit from flat bed with Mod I  Short Term Goal 2: Sit to/from stand with Mod I  Short Term Goal 3: Amb.  x 100' with FWW with Mod I  Short Term Goal 4: Up and down 2 steps with rail and Supervision  Patient Goals   Patient Goals : Return home  *No goals met 3/23/23    Education  Patient Education  Education Given To: Patient  Education Provided: Role of Therapy;Plan of Care;Energy Conservation;Home Exercise Program  Education Method: Verbal  Barriers to Learning: Hearing  Education Outcome: Continued education needed;Verbalized understanding    Therapy Time   Individual Concurrent Group Co-treatment   Time

## 2023-03-23 NOTE — PLAN OF CARE
intact  Outcome: Progressing  Flowsheets (Taken 3/23/2023 0800)  Oral Mucous Membranes Remain Intact: Assess oral mucosa and hygiene practices     Problem: Musculoskeletal - Adult  Goal: Return mobility to safest level of function  Outcome: Progressing  Flowsheets (Taken 3/23/2023 0800)  Return Mobility to Safest Level of Function:   Assess patient stability and activity tolerance for standing, transferring and ambulating with or without assistive devices   Assist with transfers and ambulation using safe patient handling equipment as needed   Ensure adequate protection for wounds/incisions during mobilization   Obtain physical therapy/occupational therapy consults as needed   Apply continuous passive motion per provider or physical therapy orders to increase flexion toward goal   Instruct patient/family in ordered activity level  Goal: Maintain proper alignment of affected body part  Outcome: Progressing  Flowsheets (Taken 3/23/2023 0800)  Maintain proper alignment of affected body part:   Support and protect limb and body alignment per provider's orders   Instruct and reinforce with patient and family use of appropriate assistive device and precautions (e.g. spinal or hip dislocation precautions)  Goal: Return ADL status to a safe level of function  Outcome: Progressing  Flowsheets (Taken 3/23/2023 0800)  Return ADL Status to a Safe Level of Function:   Administer medication as ordered   Assess activities of daily living deficits and provide assistive devices as needed   Obtain physical therapy/occupational therapy consults as needed   Assist and instruct patient to increase activity and self care as tolerated     Problem: Genitourinary - Adult  Goal: Absence of urinary retention  Outcome: Progressing  Flowsheets (Taken 3/23/2023 0800)  Absence of urinary retention: Assess patients ability to void and empty bladder     Problem: Infection - Adult  Goal: Absence of infection at discharge  Outcome: Progressing  Flowsheets (Taken 3/23/2023 0800)  Absence of infection at discharge:   Assess and monitor for signs and symptoms of infection   Monitor lab/diagnostic results   Administer medications as ordered  Goal: Absence of infection during hospitalization  Outcome: Progressing  Flowsheets (Taken 3/23/2023 0800)  Absence of infection during hospitalization:   Assess and monitor for signs and symptoms of infection   Monitor lab/diagnostic results   Administer medications as ordered   Instruct and encourage patient and family to use good hand hygiene technique  Goal: Absence of fever/infection during anticipated neutropenic period  Outcome: Progressing  Flowsheets (Taken 3/23/2023 0800)  Absence of fever/infection during anticipated neutropenic period:   Monitor white blood cell count   Administer growth factors as ordered   Implement neutropenic guidelines     Problem: Pain  Goal: Verbalizes/displays adequate comfort level or baseline comfort level  Outcome: Progressing  Flowsheets (Taken 3/23/2023 0800)  Verbalizes/displays adequate comfort level or baseline comfort level:   Encourage patient to monitor pain and request assistance   Assess pain using appropriate pain scale   Administer analgesics based on type and severity of pain and evaluate response   Implement non-pharmacological measures as appropriate and evaluate response   Consider cultural and social influences on pain and pain management   Notify Licensed Independent Practitioner if interventions unsuccessful or patient reports new pain

## 2023-03-23 NOTE — NURSE NAVIGATOR
HF RN continues to follow peripherally. Renal w/u in progress. Nephrology following. HF dc instructions added to the AVS as well as NELL as plan is home with [facility]. I have made pt 2 hospital f/u appts in regards to HF. Fri 3/31 at 2:00 with Dr Matti Lopez.  Maria Luz Bianchi - PCP  Tobias Greenwood 4/14 at 1:30 with Dr Pilo Mills - Cardiologist (soonest available)  Both appts placed on AVS

## 2023-03-24 PROBLEM — N18.4 STAGE 4 CHRONIC KIDNEY DISEASE (HCC): Status: ACTIVE | Noted: 2023-03-24

## 2023-03-24 PROBLEM — I47.29 NONSUSTAINED VENTRICULAR TACHYCARDIA (HCC): Status: ACTIVE | Noted: 2023-03-24

## 2023-03-24 LAB
ALBUMIN SERPL-MCNC: 3.1 G/DL (ref 3.4–5)
ANION GAP SERPL CALCULATED.3IONS-SCNC: 11 MMOL/L (ref 3–16)
BUN SERPL-MCNC: 55 MG/DL (ref 7–20)
CALCIUM SERPL-MCNC: 10.5 MG/DL (ref 8.3–10.6)
CHLORIDE SERPL-SCNC: 110 MMOL/L (ref 99–110)
CO2 SERPL-SCNC: 22 MMOL/L (ref 21–32)
CREAT SERPL-MCNC: 2.6 MG/DL (ref 0.8–1.3)
DEPRECATED RDW RBC AUTO: 17.8 % (ref 12.4–15.4)
GFR SERPLBLD CREATININE-BSD FMLA CKD-EPI: 22 ML/MIN/{1.73_M2}
GLUCOSE SERPL-MCNC: 136 MG/DL (ref 70–99)
HCT VFR BLD AUTO: 43.9 % (ref 40.5–52.5)
HGB BLD-MCNC: 14 G/DL (ref 13.5–17.5)
MAGNESIUM SERPL-MCNC: 2.5 MG/DL (ref 1.8–2.4)
MCH RBC QN AUTO: 31.7 PG (ref 26–34)
MCHC RBC AUTO-ENTMCNC: 31.8 G/DL (ref 31–36)
MCV RBC AUTO: 99.8 FL (ref 80–100)
PHOSPHATE SERPL-MCNC: 4.1 MG/DL (ref 2.5–4.9)
PLATELET # BLD AUTO: 170 K/UL (ref 135–450)
PMV BLD AUTO: 8.5 FL (ref 5–10.5)
POTASSIUM SERPL-SCNC: 4.5 MMOL/L (ref 3.5–5.1)
RBC # BLD AUTO: 4.4 M/UL (ref 4.2–5.9)
SODIUM SERPL-SCNC: 143 MMOL/L (ref 136–145)
WBC # BLD AUTO: 5 K/UL (ref 4–11)

## 2023-03-24 PROCEDURE — 36415 COLL VENOUS BLD VENIPUNCTURE: CPT

## 2023-03-24 PROCEDURE — 94680 O2 UPTK RST&XERS DIR SIMPLE: CPT

## 2023-03-24 PROCEDURE — 51798 US URINE CAPACITY MEASURE: CPT

## 2023-03-24 PROCEDURE — 1200000000 HC SEMI PRIVATE

## 2023-03-24 PROCEDURE — 85027 COMPLETE CBC AUTOMATED: CPT

## 2023-03-24 PROCEDURE — 6370000000 HC RX 637 (ALT 250 FOR IP): Performed by: INTERNAL MEDICINE

## 2023-03-24 PROCEDURE — 83735 ASSAY OF MAGNESIUM: CPT

## 2023-03-24 PROCEDURE — 99223 1ST HOSP IP/OBS HIGH 75: CPT | Performed by: INTERNAL MEDICINE

## 2023-03-24 PROCEDURE — 97530 THERAPEUTIC ACTIVITIES: CPT

## 2023-03-24 PROCEDURE — 6370000000 HC RX 637 (ALT 250 FOR IP): Performed by: FAMILY MEDICINE

## 2023-03-24 PROCEDURE — 2580000003 HC RX 258: Performed by: FAMILY MEDICINE

## 2023-03-24 PROCEDURE — 80069 RENAL FUNCTION PANEL: CPT

## 2023-03-24 PROCEDURE — 2700000000 HC OXYGEN THERAPY PER DAY

## 2023-03-24 PROCEDURE — 97110 THERAPEUTIC EXERCISES: CPT

## 2023-03-24 PROCEDURE — 94761 N-INVAS EAR/PLS OXIMETRY MLT: CPT

## 2023-03-24 RX ORDER — TORSEMIDE 20 MG/1
40 TABLET ORAL DAILY
Status: DISCONTINUED | OUTPATIENT
Start: 2023-03-24 | End: 2023-03-25 | Stop reason: HOSPADM

## 2023-03-24 RX ADMIN — SODIUM CHLORIDE, PRESERVATIVE FREE 10 ML: 5 INJECTION INTRAVENOUS at 08:44

## 2023-03-24 RX ADMIN — ASPIRIN 81 MG 81 MG: 81 TABLET ORAL at 08:43

## 2023-03-24 RX ADMIN — FLUTICASONE PROPIONATE 2 SPRAY: 50 SPRAY, METERED NASAL at 08:43

## 2023-03-24 RX ADMIN — TORSEMIDE 40 MG: 20 TABLET ORAL at 13:45

## 2023-03-24 RX ADMIN — TAMSULOSIN HYDROCHLORIDE 0.4 MG: 0.4 CAPSULE ORAL at 21:18

## 2023-03-24 RX ADMIN — SODIUM CHLORIDE, PRESERVATIVE FREE 10 ML: 5 INJECTION INTRAVENOUS at 21:19

## 2023-03-24 RX ADMIN — APIXABAN 2.5 MG: 2.5 TABLET, FILM COATED ORAL at 21:18

## 2023-03-24 RX ADMIN — APIXABAN 2.5 MG: 2.5 TABLET, FILM COATED ORAL at 08:43

## 2023-03-24 RX ADMIN — METOPROLOL SUCCINATE 12.5 MG: 25 TABLET, EXTENDED RELEASE ORAL at 11:00

## 2023-03-24 RX ADMIN — LEVOTHYROXINE SODIUM 75 MCG: 0.07 TABLET ORAL at 07:35

## 2023-03-24 RX ADMIN — ALLOPURINOL 100 MG: 100 TABLET ORAL at 08:43

## 2023-03-24 NOTE — PROGRESS NOTES
Functional mobility training, Transfer training, Neuromuscular re-education, Gait training, Stair training, Equipment evaluation, education, & procurement, Patient/Caregiver education & training, Safety education & training, Home exercise program, Therapeutic activities, Endurance training  Safety Devices  Type of Devices: Call light within reach, Chair alarm in place, Gait belt, Patient at risk for falls, Left in chair, Nurse notified, All fall risk precautions in place  Restraints  Restraints Initially in Place: No     Restrictions  Restrictions/Precautions  Restrictions/Precautions: Fall Risk, Up as Tolerated (High fall risk)  Required Braces or Orthoses?: No  Position Activity Restriction  Other position/activity restrictions: 2L O2, 2000ml fluid restriction     Subjective   General  Chart Reviewed: Yes  Patient assessed for rehabilitation services?: Yes  Additional Pertinent Hx: The patient is a 80 y.o. male with paf, hfref, ckd IIIb, chronic troponin elevation, gout, who presents to [facility] with sob. He drove himself to his eye dr and was found to be hypotensive, hypoxemic there and sent to the ed. Response To Previous Treatment: Patient with no complaints from previous session. Family / Caregiver Present: No  Referring Practitioner: Dr. Gurpreet Caraballo  Referral Date : 03/20/23  Diagnosis: CHF  Follows Commands: Within Functional Limits (With slightly increased time)  General Comment  Comments: Patient sitting in recliner chair upon arrival, patient's son present. Patient is agreeable to PT. RN reports patient BP more stable this date. Subjective  Subjective: Pt denies pain.          Social/Functional History  Social/Functional History  Lives With: Alone  Type of Home: House  Home Layout: One level  Home Access: Stairs to enter with rails  Entrance Stairs - Number of Steps: 2  Entrance Stairs - Rails: Both  Bathroom Shower/Tub: Walk-in shower  Bathroom Toilet: Handicap height  Bathroom Equipment: Grab bars assistance  Quality of Gait: some difficulty initiating steps and with transition of tile to carpet but no LOB; con't with L foot drag but had no LOB due to the drag; tends to have the walker too far ahead of him at times  Distance: 120' + 120'     Balance  Sitting - Static: Good  Sitting - Dynamic: Good  Standing - Static: Good;-  Standing - Dynamic: Good; - (with walker)  Exercise Treatment: B ankle pumps for edema reduction 20x        AM-PAC Score  AM-PAC Inpatient Mobility Raw Score : 18 (03/24/23 1049)  AM-PAC Inpatient T-Scale Score : 43.63 (03/24/23 1049)  Mobility Inpatient CMS 0-100% Score: 46.58 (03/24/23 1049)  Mobility Inpatient CMS G-Code Modifier : CK (03/24/23 1049)          Goals  Short Term Goals  Time Frame for Short Term Goals: By d/c  (progressing toward goals)  Short Term Goal 1: Supine to/from sit from flat bed with Mod I  Short Term Goal 2: Sit to/from stand with Mod I  Short Term Goal 3: Amb.  x 100' with FWW with Mod I  Short Term Goal 4: Up and down 2 steps with rail and Supervision  Patient Goals   Patient Goals : Return home       Education  Patient Education  Education Given To: Patient  Education Provided: Role of Therapy;Plan of Care;Energy Conservation;Home Exercise Program  Education Method: Verbal  Barriers to Learning: Hearing  Education Outcome: Continued education needed;Verbalized understanding      Therapy Time   Individual Concurrent Group Co-treatment   Time In 1008         Time Out 1047         Minutes 39         Timed Code Treatment Minutes: [facility], [address] PT, [address] [place] [address], [id number]

## 2023-03-24 NOTE — PLAN OF CARE
Problem: Discharge Planning  Goal: Discharge to home or other facility with appropriate resources  Outcome: Progressing  Flowsheets (Taken 3/23/2023 2019)  Discharge to home or other facility with appropriate resources:   Identify barriers to discharge with patient and caregiver   Arrange for needed discharge resources and transportation as appropriate   Identify discharge learning needs (meds, wound care, etc)   Refer to discharge planning if patient needs post-hospital services based on physician order or complex needs related to functional status, cognitive ability or social support system     Problem: Safety - Adult  Goal: Free from fall injury  Outcome: Progressing     Problem: ABCDS Injury Assessment  Goal: Absence of physical injury  Outcome: Progressing     Problem: Respiratory - Adult  Goal: Achieves optimal ventilation and oxygenation  Outcome: Progressing  Flowsheets (Taken 3/23/2023 2019)  Achieves optimal ventilation and oxygenation:   Assess for changes in respiratory status   Assess for changes in mentation and behavior   Position to facilitate oxygenation and minimize respiratory effort   Oxygen supplementation based on oxygen saturation or arterial blood gases   Encourage broncho-pulmonary hygiene including cough, deep breathe, incentive spirometry   Assess and instruct to report shortness of breath or any respiratory difficulty   Respiratory therapy support as indicated     Problem: Cardiovascular - Adult  Goal: Maintains optimal cardiac output and hemodynamic stability  Outcome: Progressing    Goal: Absence of cardiac dysrhythmias or at baseline  Outcome: Progressing     Problem: Metabolic/Fluid and Electrolytes - Adult  Goal: Electrolytes maintained within normal limits  Outcome: Progressing  Goal: Hemodynamic stability and optimal renal function maintained  Outcome: Progressing  Goal: Glucose maintained within prescribed range  Outcome: Progressing     Problem: Neurosensory - Adult  Goal: Achieves stable or improved neurological status  Outcome: Progressing  Goal: Absence of seizures    Outcome: Progressing  Goal: Remains free of injury related to seizures activity  Outcome: Progressing  Goal: Achieves maximal functionality and self care  Outcome: Progressing     Problem: Skin/Tissue Integrity - Adult  Goal: Skin integrity remains intact  Outcome: Progressing  Flowsheets (Taken 3/23/2023 2019)  Skin Integrity Remains Intact:   Monitor for areas of redness and/or skin breakdown   Assess vascular access sites hourly  Goal: Incisions, wounds, or drain sites healing without S/S of infection  Outcome: Progressing  Goal: Oral mucous membranes remain intact  Outcome: Progressing     Problem: Musculoskeletal - Adult  Goal: Return mobility to safest level of function  Outcome: Progressing  Flowsheets (Taken 3/23/2023 2019)  Return Mobility to Safest Level of Function:   Assess patient stability and activity tolerance for standing, transferring and ambulating with or without assistive devices   Assist with transfers and ambulation using safe patient handling equipment as needed   Obtain physical therapy/occupational therapy consults as needed   Instruct patient/family in ordered activity level  Goal: Maintain proper alignment of affected body part  Outcome: Progressing    Goal: Return ADL status to a safe level of function  Outcome: Progressing     Problem: Genitourinary - Adult  Goal: Absence of urinary retention  Outcome: Progressing       Problem: Infection - Adult  Goal: Absence of infection at discharge  Outcome: Progressing  Goal: Absence of infection during hospitalization  Outcome: Progressing  Goal: Absence of fever/infection during anticipated neutropenic period  Outcome: Progressing     Problem: Pain  Goal: Verbalizes/displays adequate comfort level or baseline comfort level  Outcome: Progressing

## 2023-03-24 NOTE — PROGRESS NOTES
Occupational Therapy      OT attempted to see pt for tx this date. Pt using toilet with nursing upon arrival and pt refusing tx wanting to get back into bed. Therapy to attempt back later as schedule allows.      Electronically signed by Lexx Barnard OT on 3/24/2023 at 3:47 PM

## 2023-03-24 NOTE — PROGRESS NOTES
wnl  Imaging: cxr; CHF with mild pulmonary edema     Plan:  Acute on chronic systolic, diastolic chf exacerbation   HOLLI  - last ECHO:  12/22 lvef 15% severe global hypokinesis, ddII, severe la dilation, rv severely enlarged and hypokinetic  - stop lasix now due to crt increased   - lasix given 3/23 per nephro  - hold  BB, ACEi/ARB/arni due to hypotension, holli  - daily wts  - I/Os  - consult CHF RN  - consulted palliative medicine  - nephrology consulted  - no SGLT2 inhibitor and aldosterone inhibitor due to HOLLI; no ICD due to code status/goals of care     Acute hypoxic and hypercarbic respiratory failure, POA  - so2 < 90% on ra, rr> 18  - supplemental o2 to maintain so2 > 90%     HOLLI ckd III6  - crt baseline 2.1 - 2.3, now 2.9 -> 2.8  - Avoid nephrotoxins     Hypothyroid  - tsh 9.13, ft4 1, ft3 1.3  - start synthroid 75 mcg daily and repeat tsh in 6 weeks     Chronic conditions - continue home meds unless otherwise stated  Paf  Gout     DVT Prophylaxis: warfarin/DOAC  Diet: ADULT DIET; Regular;  No Added Salt (3-4 gm); 2000 ml  Code Status: DNR-CC    PT/OT Eval Status: home PT    Dispo - Shasha Borja MD

## 2023-03-24 NOTE — PLAN OF CARE
physical therapy orders to increase flexion toward goal   Instruct patient/family in ordered activity level  Goal: Maintain proper alignment of affected body part  3/24/2023 1407 by Tevin Syed RN  Outcome: Progressing  Flowsheets (Taken 3/24/2023 0847)  Maintain proper alignment of affected body part:   Support and protect limb and body alignment per provider's orders   Instruct and reinforce with patient and family use of appropriate assistive device and precautions (e.g. spinal or hip dislocation precautions)  Goal: Return ADL status to a safe level of function  3/24/2023 1407 by Tevin Syed RN  Outcome: Progressing  Flowsheets (Taken 3/24/2023 0847)  Return ADL Status to a Safe Level of Function:   Administer medication as ordered   Assess activities of daily living deficits and provide assistive devices as needed     Problem: Genitourinary - Adult  Goal: Absence of urinary retention  3/24/2023 1407 by Tevin Syed RN  Outcome: Progressing  Flowsheets (Taken 3/24/2023 0847)  Absence of urinary retention:   Assess patients ability to void and empty bladder   Place urinary catheter per Licensed Independent Practitioner order if needed   Discuss with Licensed Independent Practitioner  medications to alleviate retention as needed   Discuss catheterization for long term situations as appropriate     Problem: Infection - Adult  Goal: Absence of infection at discharge  3/24/2023 1407 by Tevin Syed RN  Outcome: Progressing  Flowsheets (Taken 3/24/2023 0847)  Absence of infection at discharge:   Assess and monitor for signs and symptoms of infection   Monitor lab/diagnostic results   Administer medications as ordered   Instruct and encourage patient and family to use good hand hygiene technique   Identify and instruct in appropriate isolation precautions for identified infection/condition  Goal: Absence of infection during hospitalization  3/24/2023 1407 by Tevin Syed RN  Outcome: Progressing  Flowsheets (Taken 3/24/2023 0847)  Absence of infection during hospitalization:   Assess and monitor for signs and symptoms of infection   Monitor lab/diagnostic results   Administer medications as ordered  Goal: Absence of fever/infection during anticipated neutropenic period  3/24/2023 1407 by Jayesh Cisneros RN  Outcome: Progressing  Flowsheets (Taken 3/24/2023 0847)  Absence of fever/infection during anticipated neutropenic period: Monitor white blood cell count     Problem: Pain  Goal: Verbalizes/displays adequate comfort level or baseline comfort level  3/24/2023 1407 by Jayesh Cisneros RN  Outcome: Progressing  Flowsheets (Taken 3/24/2023 0749)  Verbalizes/displays adequate comfort level or baseline comfort level:   Encourage patient to monitor pain and request assistance   Assess pain using appropriate pain scale   Administer analgesics based on type and severity of pain and evaluate response   Implement non-pharmacological measures as appropriate and evaluate response   Consider cultural and social influences on pain and pain management   Notify Licensed Independent Practitioner if interventions unsuccessful or patient reports new pain

## 2023-03-24 NOTE — CARE COORDINATION
3/24 Met with patient and 2 sons at bedside to discuss discharge plan. They are interested in taking the patient home with hospice care. Perfect Serve sent to MD to order a Hospice consult. The Plan for Transition of Care is related to the following treatment goals: to return to home with hospice care. The Patient and/or patient representative Son, [facility] was provided with a choice of provider and agrees   with the discharge plan. [x] Yes [] No  Freedom of choice list was provided with basic dialogue that supports the patient's individualized plan of care/goals, treatment preferences and shares the quality data associated with the providers. [x] Yes [] No   Order faxed to [facility] per family choice. Electronically signed by Avtar Castle RN on 3/24/2023 at 2:42 PM

## 2023-03-24 NOTE — CARE COORDINATION
3/24 [address] nurse notified CM of family wanting to take patient home over the weekend and have [address] go to their house on Monday 3/27 to admit to hospice. Patient will need order for home oxygen to go home. Physician notified. Electronically signed by Staci Elaine RN on 3/24/2023 at 3:43 PM

## 2023-03-24 NOTE — PROGRESS NOTES
Hospitalist Progress Note      PCP: No primary care provider on file. Date of Admission: 3/19/2023    Chief Complaint: weakness    Hospital Course:      Subjective:  No SOB at rest, CXR shows worsening pulmonary edema    Medications:  Reviewed    Infusion Medications    sodium chloride       Scheduled Medications    torsemide  40 mg Oral Daily    allopurinol  100 mg Oral Daily    levothyroxine  75 mcg Oral Daily    apixaban  2.5 mg Oral BID    metoprolol succinate  12.5 mg Oral Daily    tamsulosin  0.4 mg Oral Nightly    sodium chloride flush  5-40 mL IntraVENous 2 times per day    fluticasone  2 spray Each Nostril Daily     PRN Meds: sodium chloride flush, sodium chloride, ondansetron **OR** ondansetron, polyethylene glycol, acetaminophen **OR** acetaminophen      Intake/Output Summary (Last 24 hours) at 3/24/2023 1951  Last data filed at 3/24/2023 1854  Gross per 24 hour   Intake 1015 ml   Output 1252 ml   Net -237 ml         Exam:    /62   Pulse (!) 102   Temp 97.7 °F (36.5 °C) (Oral)   Resp 16   Ht 5' 10\" (1.778 m)   Wt 153 lb 7 oz (69.6 kg)   SpO2 90%   BMI 22.02 kg/m²     General appearance: No apparent distress, appears stated age and cooperative. HEENT: Pupils equal, round, and reactive to light. Conjunctivae/corneas clear. Neck: Supple, with full range of motion. No jugular venous distention. Trachea midline. Respiratory:  Normal respiratory effort. Clear to auscultation, bilaterally without Rales/Wheezes/Rhonchi. Cardiovascular: Regular rate and rhythm with normal S1/S2 without murmurs, rubs or gallops. Abdomen: Soft, non-tender, non-distended with normal bowel sounds. Musculoskeletal: No clubbing, cyanosis or edema bilaterally. Full range of motion without deformity. Skin: Skin color, texture, turgor normal.  No rashes or lesions. Neurologic:  Neurovascularly intact without any focal sensory/motor deficits.  Cranial nerves: II-XII intact, grossly non-focal.  Psychiatric: Alert and oriented, thought content appropriate, normal insight  Capillary Refill: Brisk,< 3 seconds   Peripheral Pulses: +2 palpable, equal bilaterally       Labs:   Recent Labs     03/23/23  0924 03/24/23  0556   WBC 4.6 5.0   HGB 13.7 14.0   HCT 42.6 43.9    170       Recent Labs     03/22/23  0651 03/23/23  0924 03/24/23  0556   * 142 143   K 4.3 4.5 4.5   * 108 110   CO2 22 24 22   BUN 51* 57* 55*   CREATININE 2.8* 2.7* 2.6*   CALCIUM 10.4 10.4 10.5   PHOS  --  3.8 4.1       No results for input(s): AST, ALT, BILIDIR, BILITOT, ALKPHOS in the last 72 hours. No results for input(s): INR in the last 72 hours. No results for input(s): Imagene Plank in the last 72 hours. Urinalysis:      Lab Results   Component Value Date/Time    NITRU Negative 03/22/2023 12:15 PM    WBCUA 1 03/22/2023 12:15 PM    BACTERIA None Seen 03/22/2023 12:15 PM    RBCUA 0 03/22/2023 12:15 PM    BLOODU Negative 03/22/2023 12:15 PM    SPECGRAV 1.021 03/22/2023 12:15 PM    GLUCOSEU Negative 03/22/2023 12:15 PM    GLUCOSEU NEGATIVE 09/03/2011 07:10 PM       Radiology:  XR CHEST PORTABLE   Final Result   Moderate right and small left pleural effusions with right lower lobe   airspace opacities. XR CHEST (2 VW)   Final Result   CHF with mild pulmonary edema. Assessment/Plan:    Active Hospital Problems    Diagnosis Date Noted    Acute on chronic combined systolic (congestive) and diastolic (congestive) heart failure ([facility] [I50.43] 03/19/2023     Priority: Medium    Nonsustained ventricular tachycardia [I47.29] 03/24/2023    Stage 4 chronic kidney disease ([facility] [N18.4] 03/24/2023     Hospital course: a 80 y.o. male with paf, hfref, ckd IIIb, chronic troponin elevation, gout, who presents to [facility] with sob. He drove himself to his eye dr and was found to be hypotensive, hypoxemic there and sent to the ed.    He has had eye irritation for the past week but no exam was done in their

## 2023-03-24 NOTE — PROGRESS NOTES
Contacted patient's son Earline Lindsey who stated that he had just left the hospital for home which is 45 minutes away. Earline Lindsey asked me to call his brother Rosa José ([phone]) who is closer to the hospital and is planning to return to the hospital at 1700. Spoke with Rosa José to confirm that he can meet with hospital liaison in his father's room at around 1700. Rosa José asked about having hospice come to his father's house after discharge to discuss hospice services. Offered later meeting today or tomorrow. Rosa José stated that he plans to take his father home by car tomorrow and would prefer a meeting at home in the beginning of next week. Notified the [facility] of request to meet at home.      [address]   [phone]

## 2023-03-24 NOTE — PROGRESS NOTES
03/24/23 1229   Resting (Room Air)   SpO2 87   HR 95   Resting (On O2)   SpO2 94   HR 95   O2 Device Nasal cannula   O2 Flow Rate (l/min) 2 l/min      Patient requiring 2lpm home O2

## 2023-03-24 NOTE — CONSULTS
Referring Physician: Dr. Houston Swenson  Reason for Consultation: \"Worsening pulmonary edema. EF <15%\"  Chief Complaint: Short of breath    Subjective:   History of Present Illness:  Bard Bess is a 80 y.o. patient who presented to the hospital with complaints of shortness of breath and hypoxia. The patient typically follows with [facility] cardiology. Outpatient cardiology notes were reviewed through care everywhere. His most recent office visit was 3/3/2023 where he had been having weight gain and his torsemide was increased. Patient was admitted 6 days ago for CHF exacerbation but now consultation has been placed. Long conversation was held with the patient, his son present bedside, and another son via the phone. Goals of care were discussed and all are in agreement with conservative treatment/comfort care. The patient wants to go home today. His sons are comfortable with this decision and feel that he has shown improvement. He has chronic lower extremity edema which the patient feels is at baseline. He has chronic dyspnea on exertion which again he feels is back to baseline. In general, he feels \"better\" wearing supplemental oxygen which hopefully he can go home with. Home health and/or home hospice were discussed. The patient and family are familiar with hospice care with the patient's wife a few years ago. The patient's case was also discussed with the consulting nephrologist.    Past Medical History:   has a past medical history of Cancer ([address] CHF (congestive heart failure) ([address] and TIA (transient ischemic attack). Surgical History:  Denies prior cardiac surgery. Social History:   reports that he has been smoking pipe. He does not have any smokeless tobacco history on file. He reports that he does not drink alcohol and does not use drugs. Family History:  Denies premature coronary atherosclerosis.     Home Medications:  Were reviewed and are listed in nursing record and/or ACE-I/ARB/Aldactone/SGLT2i with HOLLI on CKD and comfort care goals. Will resume Toprol-XL. Start torsemide 40 mg daily. If patient experiencing worsening shortness of breath, may take an extra torsemide 20 mg tablet. Patient is not a candidate for ICD with advanced age and goals of comfort care. 2) Nonsustained VT. Asymptomatic. Will resume beta-blocker as tolerated. Patient is not an ICD candidate. 3) CKD stage IV. Will start torsemide 40 mg daily and adjust based on symptoms and less concerned about changes in creatinine function with goals of comfort care. 4) Demand ischemia. History is not consistent with acute coronary syndrome. Elevated troponin likely related to CKD and severity of CHF. No plans for angiography or stress testing with advanced age and goals of comfort care. Code status: Patient comfort care measures only. Overall, the problems requiring hospitalization are high in severity. Will sign off. Call with questions. Family and patient would prefer to transition care from [address] [place] [address] to the [facility] and will arrange follow-up. Thank you for allowing us to participate in the care of Jaqueline Fair. Griselda Shorter.  Alee De Paz, [address] [place] [address] Road  3/24/2023 10:20 AM

## 2023-03-25 VITALS
SYSTOLIC BLOOD PRESSURE: 105 MMHG | OXYGEN SATURATION: 93 % | DIASTOLIC BLOOD PRESSURE: 75 MMHG | WEIGHT: 148.81 LBS | HEART RATE: 110 BPM | BODY MASS INDEX: 21.3 KG/M2 | HEIGHT: 70 IN | TEMPERATURE: 97.7 F | RESPIRATION RATE: 15 BRPM

## 2023-03-25 LAB — NT-PROBNP SERPL-MCNC: ABNORMAL PG/ML (ref 0–449)

## 2023-03-25 PROCEDURE — 6370000000 HC RX 637 (ALT 250 FOR IP): Performed by: FAMILY MEDICINE

## 2023-03-25 PROCEDURE — 36415 COLL VENOUS BLD VENIPUNCTURE: CPT

## 2023-03-25 PROCEDURE — 6370000000 HC RX 637 (ALT 250 FOR IP): Performed by: INTERNAL MEDICINE

## 2023-03-25 PROCEDURE — 2580000003 HC RX 258: Performed by: FAMILY MEDICINE

## 2023-03-25 PROCEDURE — 83880 ASSAY OF NATRIURETIC PEPTIDE: CPT

## 2023-03-25 RX ORDER — MORPHINE SULFATE 100 MG/5ML
10 SOLUTION ORAL
Qty: 1 EACH | Refills: 0 | Status: SHIPPED | OUTPATIENT
Start: 2023-03-25 | End: 2023-03-28

## 2023-03-25 RX ORDER — LORAZEPAM 2 MG/ML
1 CONCENTRATE ORAL EVERY 8 HOURS PRN
Qty: 1 EACH | Refills: 0 | Status: SHIPPED | OUTPATIENT
Start: 2023-03-25 | End: 2023-04-08

## 2023-03-25 RX ORDER — CIPROFLOXACIN HYDROCHLORIDE 3.5 MG/ML
1 SOLUTION/ DROPS TOPICAL
Qty: 1 EACH | Refills: 0 | Status: SHIPPED | OUTPATIENT
Start: 2023-03-25 | End: 2023-04-04

## 2023-03-25 RX ORDER — LEVOTHYROXINE SODIUM 0.07 MG/1
75 TABLET ORAL DAILY
Qty: 30 TABLET | Refills: 3 | Status: SHIPPED | OUTPATIENT
Start: 2023-03-26

## 2023-03-25 RX ADMIN — SODIUM CHLORIDE, PRESERVATIVE FREE 10 ML: 5 INJECTION INTRAVENOUS at 09:11

## 2023-03-25 RX ADMIN — APIXABAN 2.5 MG: 2.5 TABLET, FILM COATED ORAL at 09:08

## 2023-03-25 RX ADMIN — METOPROLOL SUCCINATE 12.5 MG: 25 TABLET, EXTENDED RELEASE ORAL at 09:09

## 2023-03-25 RX ADMIN — FLUTICASONE PROPIONATE 2 SPRAY: 50 SPRAY, METERED NASAL at 09:09

## 2023-03-25 RX ADMIN — LEVOTHYROXINE SODIUM 75 MCG: 0.07 TABLET ORAL at 05:22

## 2023-03-25 RX ADMIN — ALLOPURINOL 100 MG: 100 TABLET ORAL at 09:09

## 2023-03-25 RX ADMIN — TORSEMIDE 40 MG: 20 TABLET ORAL at 09:09

## 2023-03-25 NOTE — PLAN OF CARE
Problem: Discharge Planning  Goal: Discharge to home or other facility with appropriate resources  3/25/2023 0200 by Estela Munguia RN  Outcome: Progressing  3/24/2023 1407 by Hardeep Torres RN  Outcome: Progressing  Flowsheets (Taken 3/24/2023 0847)  Discharge to home or other facility with appropriate resources: Arrange for needed discharge resources and transportation as appropriate     Problem: Safety - Adult  Goal: Free from fall injury  3/25/2023 0200 by Estela Munguia RN  Outcome: Progressing  3/24/2023 1407 by Hardeep Torres RN  Outcome: Progressing  Flowsheets (Taken 3/24/2023 0847)  Free From Fall Injury: Instruct family/caregiver on patient safety     Problem: ABCDS Injury Assessment  Goal: Absence of physical injury  3/25/2023 0200 by Estela Munguia RN  Outcome: Jacklyn Pimple  3/24/2023 1407 by Hardeep Torres RN  Outcome: Progressing  Flowsheets (Taken 3/24/2023 0847)  Absence of Physical Injury: Implement safety measures based on patient assessment     Problem: Respiratory - Adult  Goal: Achieves optimal ventilation and oxygenation  3/25/2023 0200 by Estela Munguia RN  Outcome: Jacklyn Pimple  3/24/2023 1407 by Hardeep Torres RN  Outcome: Progressing  Flowsheets (Taken 3/24/2023 0847)  Achieves optimal ventilation and oxygenation:   Assess for changes in respiratory status   Assess for changes in mentation and behavior   Position to facilitate oxygenation and minimize respiratory effort   Oxygen supplementation based on oxygen saturation or arterial blood gases   Initiate smoking cessation protocol as indicated   Encourage broncho-pulmonary hygiene including cough, deep breathe, incentive spirometry   Assess and instruct to report shortness of breath or any respiratory difficulty   Respiratory therapy support as indicated   Assess the need for suctioning and aspirate as needed     Problem: Cardiovascular - Adult  Goal: Maintains optimal cardiac output and hemodynamic stability  3/25/2023 level  3/25/2023 0200 by Kalia Sheikh RN  Outcome: Progressing  3/24/2023 1407 by Jhonny Gregg RN  Outcome: Progressing  Flowsheets (Taken 3/24/2023 5391)  Verbalizes/displays adequate comfort level or baseline comfort level:   Encourage patient to monitor pain and request assistance   Assess pain using appropriate pain scale   Administer analgesics based on type and severity of pain and evaluate response   Implement non-pharmacological measures as appropriate and evaluate response   Consider cultural and social influences on pain and pain management   Notify Licensed Independent Practitioner if interventions unsuccessful or patient reports new pain

## 2023-03-25 NOTE — PLAN OF CARE
Progressing  Goal: Oral mucous membranes remain intact  3/25/2023 1230 by Christy Medeiros RN  Outcome: Adequate for Discharge  3/25/2023 0200 by Sara Sanz RN  Outcome: Progressing     Problem: Musculoskeletal - Adult  Goal: Return mobility to safest level of function  3/25/2023 1230 by Christy Medeiros RN  Outcome: Adequate for Discharge  Flowsheets (Taken 3/25/2023 0800)  Return Mobility to Safest Level of Function: Assess patient stability and activity tolerance for standing, transferring and ambulating with or without assistive devices  3/25/2023 0200 by Sara Sanz RN  Outcome: Progressing  Goal: Maintain proper alignment of affected body part  3/25/2023 1230 by Christy Medeiros RN  Outcome: Adequate for Discharge  3/25/2023 0200 by Sara Sanz RN  Outcome: Progressing  Goal: Return ADL status to a safe level of function  3/25/2023 1230 by Christy Medeiros RN  Outcome: Adequate for Discharge  3/25/2023 0200 by Sara Sanz RN  Outcome: Progressing     Problem: Genitourinary - Adult  Goal: Absence of urinary retention  3/25/2023 1230 by Christy Medeiros RN  Outcome: Adequate for Discharge  3/25/2023 0200 by Sara Sanz RN  Outcome: Progressing     Problem: Infection - Adult  Goal: Absence of infection at discharge  3/25/2023 1230 by Christy Medeiros RN  Outcome: Adequate for Discharge  Flowsheets (Taken 3/25/2023 0800)  Absence of infection at discharge:   Assess and monitor for signs and symptoms of infection   Monitor lab/diagnostic results  3/25/2023 0200 by Sara Sanz RN  Outcome: Progressing  Goal: Absence of infection during hospitalization  3/25/2023 1230 by Christy Medeiros RN  Outcome: Adequate for Discharge  Flowsheets (Taken 3/25/2023 0800)  Absence of infection during hospitalization: Assess and monitor for signs and symptoms of infection  3/25/2023 0200 by Sara Sanz RN  Outcome: Progressing  Goal: Absence of fever/infection during anticipated neutropenic period  3/25/2023 1230 by Lexi Bowie RN  Outcome: Adequate for Discharge  3/25/2023 0200 by Wilkins Lesches, RN  Outcome: Progressing     Problem: Pain  Goal: Verbalizes/displays adequate comfort level or baseline comfort level  3/25/2023 1230 by Lexi Bowie RN  Outcome: Adequate for Discharge  3/25/2023 0200 by Wilkins Lesches, RN  Outcome: Progressing

## 2023-03-25 NOTE — CARE COORDINATION
Discharge Planning:  TARA spoke with pts son, Johanna Murillo. [phone]  Johanna Murillo was present in the room with pt. Johanna Murillo stated he would like to get pt home today with hospice services. TARA contacted Lev Valderrama with [facility] to determine when she would be able to assess this pt today. Message left requesting a return call. Facesheet faxed to [facility] at [phone]. TARA spoke with Lev Valderrama with [facility]. Lev Valderrama will be meeting with this pt and family at 56. Plan: Home with [address]. HOC is meeting with pt and family at 56.   TRAY Campbell  [phone]  Electronically signed by MARCELO Oleary on 3/25/2023 at 12:12 PM

## 2023-03-29 NOTE — DISCHARGE SUMMARY
BMI 21.35 kg/m²     General appearance: No apparent distress, appears stated age and cooperative. HEENT: Pupils equal, round, and reactive to light. Conjunctivae/corneas clear. Neck: Supple, with full range of motion. No jugular venous distention. Trachea midline. Respiratory:  Normal respiratory effort. Clear to auscultation, bilaterally without Rales/Wheezes/Rhonchi. Cardiovascular: Regular rate and rhythm with normal S1/S2 without murmurs, rubs or gallops. Abdomen: Soft, non-tender, non-distended with normal bowel sounds. Musculoskelatal: No clubbing, cyanosis or edema bilaterally. Full range of motion without deformity. Skin: Skin color, texture, turgor normal.  No rashes or lesions. Neurologic:  Neurovascularly intact without any focal sensory/motor deficits. Cranial nerves: II-XII intact, grossly non-focal.  Psychiatric: Alert and oriented, thought content appropriate, normal insight      Consults:     IP CONSULT TO HOSPITALIST  IP CONSULT TO HEART FAILURE NURSE/COORDINATOR  IP CONSULT TO DIETITIAN  IP CONSULT TO PALLIATIVE CARE  IP CONSULT TO NEPHROLOGY  IP CONSULT TO CARDIOLOGY  IP CONSULT TO HOSPICE    Significant Diagnostic Studies:          Radiology:  XR CHEST PORTABLE   Final Result   Moderate right and small left pleural effusions with right lower lobe   airspace opacities. XR CHEST (2 VW)   Final Result   CHF with mild pulmonary edema. PCP/SNF to follow up: Home hospice care    Disposition:  Home    Condition on d/c:  Stable     Discharge Instructions/Follow-up:  Hospice care    Code Status:  Prior     Activity: activity as tolerated    Diet: cardiac diet    Labs:  For convenience and continuity at follow-up the following most recent labs are provided:      CBC:    Lab Results   Component Value Date/Time    WBC 5.0 03/24/2023 05:56 AM    HGB 14.0 03/24/2023 05:56 AM    HCT 43.9 03/24/2023 05:56 AM     03/24/2023 05:56 AM       Renal:    Lab Results   Component Value

## 2023-03-30 NOTE — PROGRESS NOTES
[facility]      Cardiology Consult    Leelee Mayer  7/1/1930 March 31, 2023    Referring Physician: No primary care provider on file. Reason for Referral: CHF    CC: \"Fine since being home. \"     HPI:  The patient is 80 y.o. male with a past medical history significant for systolic heart failure who presents today for management of heart failure. He presented to the hospital 3/19/23 with complaints of shortness of breath and hypoxia. The previously followed with [facility] cardiology. His most recent office visit was 3/3/2023 where he had been having weight gain and his torsemide was increased. Goals of care were discussed and all are in agreement with conservative treatment/comfort care. The patient and family are familiar with hospice care with the patient's wife a few years ago. Home hospice was arranged at discharge. Today, he presents in a wheelchair accompanied by his son. He denies any new cardiac sounding complaints and his main complaint is issues with his hearing. He was referred to ENT. He denies any worsening shortness of breath and his weight remains stable around 152 on his home scale. He reports compliance with his medications and tolerating. He denies any abnormal bleeding or bruising. Patient denies exertional chest pain/pressure, dyspnea at rest, worsening QUIJANO, PND, orthopnea, palpitations, lightheadedness, weight changes, changes in LE edema, and syncope. 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 No pertinent surgical history. History reviewed. No pertinent family history.   Social History     Tobacco Use    Smoking status: Every Day     Types: Pipe   Substance Use Topics    Alcohol use: No    Drug use: No       Allergies   Allergen Reactions    Adhesive Tape Rash     Current Outpatient Medications   Medication Sig Dispense Refill

## 2023-03-31 ENCOUNTER — OFFICE VISIT (OUTPATIENT)
Dept: CARDIOLOGY CLINIC | Age: 88
End: 2023-03-31
Payer: MEDICARE

## 2023-03-31 VITALS
BODY MASS INDEX: 21.19 KG/M2 | HEART RATE: 88 BPM | WEIGHT: 148 LBS | SYSTOLIC BLOOD PRESSURE: 115 MMHG | HEIGHT: 70 IN | DIASTOLIC BLOOD PRESSURE: 60 MMHG

## 2023-03-31 DIAGNOSIS — I47.29 NSVT (NONSUSTAINED VENTRICULAR TACHYCARDIA) (HCC): ICD-10-CM

## 2023-03-31 DIAGNOSIS — Z86.718 HISTORY OF DVT (DEEP VEIN THROMBOSIS): ICD-10-CM

## 2023-03-31 DIAGNOSIS — I50.22 CHRONIC SYSTOLIC CONGESTIVE HEART FAILURE (HCC): Primary | ICD-10-CM

## 2023-03-31 DIAGNOSIS — N18.4 STAGE 4 CHRONIC KIDNEY DISEASE (HCC): ICD-10-CM

## 2023-03-31 PROCEDURE — 93000 ELECTROCARDIOGRAM COMPLETE: CPT | Performed by: INTERNAL MEDICINE

## 2023-03-31 PROCEDURE — 1123F ACP DISCUSS/DSCN MKR DOCD: CPT | Performed by: INTERNAL MEDICINE

## 2023-03-31 PROCEDURE — 99214 OFFICE O/P EST MOD 30 MIN: CPT | Performed by: INTERNAL MEDICINE

## 2023-04-08 ENCOUNTER — APPOINTMENT (OUTPATIENT)
Dept: GENERAL RADIOLOGY | Age: 88
End: 2023-04-08
Payer: COMMERCIAL

## 2023-04-08 ENCOUNTER — HOSPITAL ENCOUNTER (EMERGENCY)
Age: 88
Discharge: HOME | End: 2023-04-08
Payer: COMMERCIAL

## 2023-04-08 VITALS
TEMPERATURE: 97.7 F | DIASTOLIC BLOOD PRESSURE: 57 MMHG | HEART RATE: 103 BPM | RESPIRATION RATE: 22 BRPM | BODY MASS INDEX: 20.81 KG/M2 | WEIGHT: 145 LBS | SYSTOLIC BLOOD PRESSURE: 78 MMHG

## 2023-04-08 DIAGNOSIS — Z51.5 HOSPICE CARE PATIENT: ICD-10-CM

## 2023-04-08 DIAGNOSIS — Z71.89 GOALS OF CARE, COUNSELING/DISCUSSION: ICD-10-CM

## 2023-04-08 DIAGNOSIS — M25.511 ACUTE PAIN OF RIGHT SHOULDER: Primary | ICD-10-CM

## 2023-04-08 LAB
ALBUMIN SERPL-MCNC: 2.8 G/DL (ref 3.4–5)
ALBUMIN/GLOB SERPL: 0.8 {RATIO} (ref 1.1–2.2)
ALP SERPL-CCNC: 73 U/L (ref 40–129)
ALT SERPL-CCNC: 43 U/L (ref 10–40)
ANION GAP SERPL CALCULATED.3IONS-SCNC: 16 MMOL/L (ref 3–16)
AST SERPL-CCNC: 44 U/L (ref 15–37)
BASE EXCESS BLDV CALC-SCNC: -0.8 MMOL/L
BASOPHILS # BLD: 0 K/UL (ref 0–0.2)
BASOPHILS NFR BLD: 0.2 %
BILIRUB SERPL-MCNC: 0.9 MG/DL (ref 0–1)
BUN SERPL-MCNC: 103 MG/DL (ref 7–20)
CALCIUM SERPL-MCNC: 10.5 MG/DL (ref 8.3–10.6)
CHLORIDE SERPL-SCNC: 103 MMOL/L (ref 99–110)
CO2 BLDV-SCNC: 28 MMOL/L
CO2 SERPL-SCNC: 22 MMOL/L (ref 21–32)
COHGB MFR BLDV: 1.5 %
CREAT SERPL-MCNC: 3.5 MG/DL (ref 0.8–1.3)
DEPRECATED RDW RBC AUTO: 17.7 % (ref 12.4–15.4)
EOSINOPHIL # BLD: 0 K/UL (ref 0–0.6)
EOSINOPHIL NFR BLD: 0 %
GFR SERPLBLD CREATININE-BSD FMLA CKD-EPI: 16 ML/MIN/{1.73_M2}
GLUCOSE SERPL-MCNC: 86 MG/DL (ref 70–99)
HCO3 BLDV-SCNC: 26 MMOL/L (ref 23–29)
HCT VFR BLD AUTO: 43.9 % (ref 40.5–52.5)
HGB BLD-MCNC: 13.8 G/DL (ref 13.5–17.5)
LACTATE BLDV-SCNC: 3.1 MMOL/L (ref 0.4–1.9)
LYMPHOCYTES # BLD: 0.2 K/UL (ref 1–5.1)
LYMPHOCYTES NFR BLD: 1.3 %
MCH RBC QN AUTO: 31.3 PG (ref 26–34)
MCHC RBC AUTO-ENTMCNC: 31.4 G/DL (ref 31–36)
MCV RBC AUTO: 99.6 FL (ref 80–100)
METHGB MFR BLDV: 0.4 %
MONOCYTES # BLD: 0.7 K/UL (ref 0–1.3)
MONOCYTES NFR BLD: 4.4 %
NEUTROPHILS # BLD: 14.6 K/UL (ref 1.7–7.7)
NEUTROPHILS NFR BLD: 94.1 %
O2 THERAPY: ABNORMAL
PCO2 BLDV: 49.8 MMHG (ref 40–50)
PH BLDV: 7.33 [PH] (ref 7.35–7.45)
PLATELET # BLD AUTO: 145 K/UL (ref 135–450)
PMV BLD AUTO: 8.8 FL (ref 5–10.5)
PO2 BLDV: <30 MMHG
POTASSIUM SERPL-SCNC: 4.3 MMOL/L (ref 3.5–5.1)
PROT SERPL-MCNC: 6.3 G/DL (ref 6.4–8.2)
RBC # BLD AUTO: 4.41 M/UL (ref 4.2–5.9)
SAO2 % BLDV: 52 %
SODIUM SERPL-SCNC: 141 MMOL/L (ref 136–145)
WBC # BLD AUTO: 15.5 K/UL (ref 4–11)

## 2023-04-08 PROCEDURE — 71045 X-RAY EXAM CHEST 1 VIEW: CPT

## 2023-04-08 PROCEDURE — 93005 ELECTROCARDIOGRAM TRACING: CPT | Performed by: EMERGENCY MEDICINE

## 2023-04-08 PROCEDURE — 6370000000 HC RX 637 (ALT 250 FOR IP): Performed by: NURSE PRACTITIONER

## 2023-04-08 PROCEDURE — 80053 COMPREHEN METABOLIC PANEL: CPT

## 2023-04-08 PROCEDURE — 73030 X-RAY EXAM OF SHOULDER: CPT

## 2023-04-08 PROCEDURE — 82803 BLOOD GASES ANY COMBINATION: CPT

## 2023-04-08 PROCEDURE — 83605 ASSAY OF LACTIC ACID: CPT

## 2023-04-08 PROCEDURE — 85025 COMPLETE CBC W/AUTO DIFF WBC: CPT

## 2023-04-08 RX ORDER — ACETAMINOPHEN 325 MG/1
650 TABLET ORAL ONCE
Status: COMPLETED | OUTPATIENT
Start: 2023-04-08 | End: 2023-04-08

## 2023-04-08 RX ADMIN — ACETAMINOPHEN 650 MG: 325 TABLET ORAL at 13:52

## 2023-04-08 ASSESSMENT — PAIN - FUNCTIONAL ASSESSMENT: PAIN_FUNCTIONAL_ASSESSMENT: NONE - DENIES PAIN

## 2023-04-08 NOTE — ED NOTES
Pt resting in bed at this time, laying in a supine position with head of bed elevated . Call light remains in reach instructed pt how to use, and encouraged pt to call if needed assistance, no distress noted. RR even and unlabored, skin warm and dry. No needs at this time. Will continue to monitor closely.        Ivan Gómez RN  04/08/23 [id number]

## 2023-04-08 NOTE — ED NOTES
Pt arrived to the ED via EMS, pt states that his son Tatiana Pena" him out of bed this morning he had right shoulder pain, pt is alert and orient forgetful at times, vss afebrile Pt resting in bed at this time, laying in a supine position with head of bed elevated . Call light remains in reach instructed pt how to use, and encouraged pt to call if needed assistance, no distress noted. RR even and unlabored, skin warm and dry. No needs at this time. Will continue to monitor closely.        Roxann Acevedo RN  04/08/23 2059

## 2023-04-08 NOTE — ED PROVIDER NOTES
[address]  [address] [address]  Dept: [phone]  Loc: [phone]    EMERGENCY DEPARTMENT ENCOUNTER        ***This patient was not seen or evaluated by the attending physician.    ***I evaluated this patient, the attending physician was available for consultation. CHIEF COMPLAINT    Chief Complaint   Patient presents with    Shoulder Pain     Pt has shoulder pain upon movement stating \"it started after his son helped him out of bed\"        HPI    Hudson Conley is a 80 y.o. male who presents with pain to the {RIGHT/LEFT:20334} shoulder. Onset was ***. The duration has been constant since the onset. The context is that ***. The quality of the pain is sharp. The severity is ***/10. The patient has *** other associated injury. REVIEW OF SYSTEMS    Musculoskeletal: see HPI, no other joint or bony injury  Cardiac: No chest pain  Pulmonary: No difficulty breathing  Skin: No lacerations or puncture wounds  Neurologic: ***No loss of consciousness, no head injury, no paresthesias or focal distal extremity weakness    PAST MEDICAL & SURGICAL HISTORY    Past Medical History:   Diagnosis Date    Cancer (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 [address]     TIA (transient ischemic attack) 2010     No past surgical history on file. CURRENT MEDICATIONS  (may include discharge medications prescribed in the ED)  Current Outpatient Rx   Medication Sig Dispense Refill    levothyroxine (SYNTHROID) 75 MCG tablet Take 1 tablet by mouth Daily 30 tablet 3    torsemide 40 MG TABS Take 40 mg by mouth daily 30 tablet 3    LORazepam (ATIVAN) 2 MG/ML concentrated solution Take 0.5 mLs by mouth every 8 hours as needed for Agitation, Shortness of Breath or Anxiety for up to 14 days.  Max Daily Amount: 3 mg (Patient not taking: Reported on 3/31/2023) 1 each 0    apixaban (ELIQUIS) 2.5 MG TABS tablet Take 2.5 mg by mouth 2 times

## 2023-04-09 LAB
EKG ATRIAL RATE: 110 BPM
EKG DIAGNOSIS: NORMAL
EKG P AXIS: 83 DEGREES
EKG P-R INTERVAL: 200 MS
EKG Q-T INTERVAL: 408 MS
EKG QRS DURATION: 174 MS
EKG QTC CALCULATION (BAZETT): 552 MS
EKG R AXIS: 193 DEGREES
EKG T AXIS: 54 DEGREES
EKG VENTRICULAR RATE: 110 BPM